# Patient Record
Sex: MALE | Race: WHITE | NOT HISPANIC OR LATINO | Employment: OTHER | URBAN - NONMETROPOLITAN AREA
[De-identification: names, ages, dates, MRNs, and addresses within clinical notes are randomized per-mention and may not be internally consistent; named-entity substitution may affect disease eponyms.]

---

## 2017-01-26 ENCOUNTER — COMMUNICATION - GICH (OUTPATIENT)
Dept: INTERNAL MEDICINE | Facility: OTHER | Age: 72
End: 2017-01-26

## 2017-01-26 DIAGNOSIS — K29.70 GASTRITIS WITHOUT BLEEDING: ICD-10-CM

## 2017-01-26 DIAGNOSIS — N39.41 URGE INCONTINENCE: ICD-10-CM

## 2017-01-26 DIAGNOSIS — K29.90 GASTRODUODENITIS WITHOUT BLEEDING: ICD-10-CM

## 2017-01-26 DIAGNOSIS — R60.9 EDEMA: ICD-10-CM

## 2017-01-26 DIAGNOSIS — M10.9 GOUT: ICD-10-CM

## 2017-02-01 ENCOUNTER — COMMUNICATION - GICH (OUTPATIENT)
Dept: INTERNAL MEDICINE | Facility: OTHER | Age: 72
End: 2017-02-01

## 2017-02-01 DIAGNOSIS — N39.41 URGE INCONTINENCE: ICD-10-CM

## 2017-02-27 ENCOUNTER — COMMUNICATION - GICH (OUTPATIENT)
Dept: INTERNAL MEDICINE | Facility: OTHER | Age: 72
End: 2017-02-27

## 2017-02-27 DIAGNOSIS — M19.90 OSTEOARTHRITIS: ICD-10-CM

## 2017-03-02 ENCOUNTER — COMMUNICATION - GICH (OUTPATIENT)
Dept: INTERNAL MEDICINE | Facility: OTHER | Age: 72
End: 2017-03-02

## 2017-03-02 DIAGNOSIS — F69 UNSPECIFIED DISORDER OF ADULT PERSONALITY AND BEHAVIOR: ICD-10-CM

## 2017-03-06 ENCOUNTER — HISTORY (OUTPATIENT)
Dept: EMERGENCY MEDICINE | Facility: OTHER | Age: 72
End: 2017-03-06

## 2017-03-07 ENCOUNTER — AMBULATORY - GICH (OUTPATIENT)
Dept: INTERNAL MEDICINE | Facility: OTHER | Age: 72
End: 2017-03-07

## 2017-03-09 ENCOUNTER — AMBULATORY - GICH (OUTPATIENT)
Dept: INTERNAL MEDICINE | Facility: OTHER | Age: 72
End: 2017-03-09

## 2017-03-10 ENCOUNTER — HISTORY (OUTPATIENT)
Dept: INTERNAL MEDICINE | Facility: OTHER | Age: 72
End: 2017-03-10

## 2017-03-10 ENCOUNTER — OFFICE VISIT - GICH (OUTPATIENT)
Dept: INTERNAL MEDICINE | Facility: OTHER | Age: 72
End: 2017-03-10

## 2017-03-10 DIAGNOSIS — F79 INTELLECTUAL DISABILITY: ICD-10-CM

## 2017-03-10 DIAGNOSIS — R46.89 OTHER SYMPTOMS AND SIGNS INVOLVING APPEARANCE AND BEHAVIOR: ICD-10-CM

## 2017-03-10 DIAGNOSIS — Q87.40 MARFAN'S SYNDROME: ICD-10-CM

## 2017-03-13 ENCOUNTER — AMBULATORY - GICH (OUTPATIENT)
Dept: INTERNAL MEDICINE | Facility: OTHER | Age: 72
End: 2017-03-13

## 2017-03-13 ENCOUNTER — COMMUNICATION - GICH (OUTPATIENT)
Dept: INTERNAL MEDICINE | Facility: OTHER | Age: 72
End: 2017-03-13

## 2017-03-13 DIAGNOSIS — Q87.40 MARFAN'S SYNDROME: ICD-10-CM

## 2017-03-22 ENCOUNTER — AMBULATORY - GICH (OUTPATIENT)
Dept: SURGERY | Facility: OTHER | Age: 72
End: 2017-03-22

## 2017-03-27 ENCOUNTER — AMBULATORY - GICH (OUTPATIENT)
Dept: INTERNAL MEDICINE | Facility: OTHER | Age: 72
End: 2017-03-27

## 2017-03-29 ENCOUNTER — COMMUNICATION - GICH (OUTPATIENT)
Dept: INTERNAL MEDICINE | Facility: OTHER | Age: 72
End: 2017-03-29

## 2017-03-29 DIAGNOSIS — K29.90 GASTRODUODENITIS WITHOUT BLEEDING: ICD-10-CM

## 2017-03-29 DIAGNOSIS — Q87.40 MARFAN'S SYNDROME: ICD-10-CM

## 2017-03-29 DIAGNOSIS — R60.9 EDEMA: ICD-10-CM

## 2017-03-29 DIAGNOSIS — N39.41 URGE INCONTINENCE: ICD-10-CM

## 2017-03-29 DIAGNOSIS — K29.70 GASTRITIS WITHOUT BLEEDING: ICD-10-CM

## 2017-04-25 ENCOUNTER — COMMUNICATION - GICH (OUTPATIENT)
Dept: INTERNAL MEDICINE | Facility: OTHER | Age: 72
End: 2017-04-25

## 2017-04-25 DIAGNOSIS — M10.9 GOUT: ICD-10-CM

## 2017-04-25 DIAGNOSIS — K29.90 GASTRODUODENITIS WITHOUT BLEEDING: ICD-10-CM

## 2017-04-25 DIAGNOSIS — K29.70 GASTRITIS WITHOUT BLEEDING: ICD-10-CM

## 2017-04-25 DIAGNOSIS — N39.41 URGE INCONTINENCE: ICD-10-CM

## 2017-04-25 DIAGNOSIS — R60.9 EDEMA: ICD-10-CM

## 2017-04-25 DIAGNOSIS — Q87.40 MARFAN'S SYNDROME: ICD-10-CM

## 2017-05-30 ENCOUNTER — COMMUNICATION - GICH (OUTPATIENT)
Dept: INTERNAL MEDICINE | Facility: OTHER | Age: 72
End: 2017-05-30

## 2017-05-30 DIAGNOSIS — K59.09 OTHER CONSTIPATION: ICD-10-CM

## 2017-06-16 ENCOUNTER — COMMUNICATION - GICH (OUTPATIENT)
Dept: FAMILY MEDICINE | Facility: OTHER | Age: 72
End: 2017-06-16

## 2017-06-16 DIAGNOSIS — K59.00 CONSTIPATION: ICD-10-CM

## 2017-07-11 ENCOUNTER — COMMUNICATION - GICH (OUTPATIENT)
Dept: INTERNAL MEDICINE | Facility: OTHER | Age: 72
End: 2017-07-11

## 2017-07-11 ENCOUNTER — COMMUNICATION - GICH (OUTPATIENT)
Dept: FAMILY MEDICINE | Facility: OTHER | Age: 72
End: 2017-07-11

## 2017-07-11 DIAGNOSIS — M19.90 OSTEOARTHRITIS: ICD-10-CM

## 2017-07-11 DIAGNOSIS — K59.00 CONSTIPATION: ICD-10-CM

## 2017-07-17 ENCOUNTER — COMMUNICATION - GICH (OUTPATIENT)
Dept: INTERNAL MEDICINE | Facility: OTHER | Age: 72
End: 2017-07-17

## 2017-07-17 DIAGNOSIS — M19.90 OSTEOARTHRITIS: ICD-10-CM

## 2017-07-25 ENCOUNTER — HISTORY (OUTPATIENT)
Dept: INTERNAL MEDICINE | Facility: OTHER | Age: 72
End: 2017-07-25

## 2017-07-25 ENCOUNTER — OFFICE VISIT - GICH (OUTPATIENT)
Dept: INTERNAL MEDICINE | Facility: OTHER | Age: 72
End: 2017-07-25

## 2017-07-25 DIAGNOSIS — Q87.40 MARFAN'S SYNDROME: ICD-10-CM

## 2017-07-25 DIAGNOSIS — F79 INTELLECTUAL DISABILITY: ICD-10-CM

## 2017-07-26 ENCOUNTER — AMBULATORY - GICH (OUTPATIENT)
Dept: PHYSICAL THERAPY | Facility: OTHER | Age: 72
End: 2017-07-26

## 2017-07-26 DIAGNOSIS — Q87.40 MARFAN'S SYNDROME: ICD-10-CM

## 2017-07-26 DIAGNOSIS — F79 INTELLECTUAL DISABILITY: ICD-10-CM

## 2017-08-10 ENCOUNTER — COMMUNICATION - GICH (OUTPATIENT)
Dept: FAMILY MEDICINE | Facility: OTHER | Age: 72
End: 2017-08-10

## 2017-08-10 DIAGNOSIS — K59.00 CONSTIPATION: ICD-10-CM

## 2017-08-25 ENCOUNTER — HOSPITAL ENCOUNTER (OUTPATIENT)
Dept: PHYSICAL THERAPY | Facility: OTHER | Age: 72
Setting detail: THERAPIES SERIES
End: 2017-08-25
Attending: INTERNAL MEDICINE

## 2017-08-25 ENCOUNTER — AMBULATORY - GICH (OUTPATIENT)
Dept: SCHEDULING | Facility: OTHER | Age: 72
End: 2017-08-25

## 2017-08-25 DIAGNOSIS — F79 INTELLECTUAL DISABILITY: ICD-10-CM

## 2017-08-25 DIAGNOSIS — Q87.40 MARFAN'S SYNDROME: ICD-10-CM

## 2017-09-08 ENCOUNTER — COMMUNICATION - GICH (OUTPATIENT)
Dept: INTERNAL MEDICINE | Facility: OTHER | Age: 72
End: 2017-09-08

## 2017-09-08 DIAGNOSIS — M10.9 GOUT: ICD-10-CM

## 2017-10-02 ENCOUNTER — COMMUNICATION - GICH (OUTPATIENT)
Dept: INTERNAL MEDICINE | Facility: OTHER | Age: 72
End: 2017-10-02

## 2017-10-02 DIAGNOSIS — K59.00 CONSTIPATION: ICD-10-CM

## 2017-10-05 ENCOUNTER — COMMUNICATION - GICH (OUTPATIENT)
Dept: INTERNAL MEDICINE | Facility: OTHER | Age: 72
End: 2017-10-05

## 2017-10-05 DIAGNOSIS — M10.9 GOUT: ICD-10-CM

## 2017-10-09 ENCOUNTER — OFFICE VISIT - GICH (OUTPATIENT)
Dept: INTERNAL MEDICINE | Facility: OTHER | Age: 72
End: 2017-10-09

## 2017-10-09 ENCOUNTER — AMBULATORY - GICH (OUTPATIENT)
Dept: INTERNAL MEDICINE | Facility: OTHER | Age: 72
End: 2017-10-09

## 2017-10-09 ENCOUNTER — HISTORY (OUTPATIENT)
Dept: INTERNAL MEDICINE | Facility: OTHER | Age: 72
End: 2017-10-09

## 2017-10-09 DIAGNOSIS — M10.9 GOUT: ICD-10-CM

## 2017-10-09 DIAGNOSIS — F79 INTELLECTUAL DISABILITY: ICD-10-CM

## 2017-10-09 DIAGNOSIS — R73.09 OTHER ABNORMAL GLUCOSE: ICD-10-CM

## 2017-10-09 DIAGNOSIS — K59.00 CONSTIPATION: ICD-10-CM

## 2017-10-09 DIAGNOSIS — Q87.40 MARFAN'S SYNDROME: ICD-10-CM

## 2017-10-09 DIAGNOSIS — I87.2 VENOUS INSUFFICIENCY (CHRONIC) (PERIPHERAL): ICD-10-CM

## 2017-10-09 LAB
A/G RATIO - HISTORICAL: 1.4 (ref 1–2)
ABSOLUTE BASOPHILS - HISTORICAL: 0 THOU/CU MM
ABSOLUTE EOSINOPHILS - HISTORICAL: 0.1 THOU/CU MM
ABSOLUTE IMMATURE GRANULOCYTES(METAS,MYELOS,PROS) - HISTORICAL: 0 THOU/CU MM
ABSOLUTE LYMPHOCYTES - HISTORICAL: 1.9 THOU/CU MM (ref 0.9–2.9)
ABSOLUTE MONOCYTES - HISTORICAL: 0.6 THOU/CU MM
ABSOLUTE NEUTROPHILS - HISTORICAL: 4.1 THOU/CU MM (ref 1.7–7)
ALBUMIN SERPL-MCNC: 4.6 G/DL (ref 3.5–5.7)
ALP SERPL-CCNC: 97 IU/L (ref 34–104)
ALT (SGPT) - HISTORICAL: 14 IU/L (ref 7–52)
ANION GAP - HISTORICAL: 4 (ref 5–18)
AST SERPL-CCNC: 17 IU/L (ref 13–39)
BASOPHILS # BLD AUTO: 0.4 %
BILIRUB SERPL-MCNC: 1.3 MG/DL (ref 0.3–1)
BUN SERPL-MCNC: 16 MG/DL (ref 7–25)
BUN/CREAT RATIO - HISTORICAL: 17
CALCIUM SERPL-MCNC: 10 MG/DL (ref 8.6–10.3)
CHLORIDE SERPLBLD-SCNC: 103 MMOL/L (ref 98–107)
CO2 SERPL-SCNC: 27 MMOL/L (ref 21–31)
CREAT SERPL-MCNC: 0.92 MG/DL (ref 0.7–1.3)
EOSINOPHIL NFR BLD AUTO: 1 %
ERYTHROCYTE [DISTWIDTH] IN BLOOD BY AUTOMATED COUNT: 13.8 % (ref 11.5–15.5)
ESTIMATED AVERAGE GLUCOSE: 126 MG/DL
GFR IF NOT AFRICAN AMERICAN - HISTORICAL: >60 ML/MIN/1.73M2
GLOBULIN - HISTORICAL: 3.3 G/DL (ref 2–3.7)
GLUCOSE SERPL-MCNC: 130 MG/DL (ref 70–105)
HCT VFR BLD AUTO: 45.3 % (ref 37–53)
HEMOGLOBIN A1C MONITORING (POCT) - HISTORICAL: 6 % (ref 4–6.2)
HEMOGLOBIN: 15.5 G/DL (ref 13.5–17.5)
IMMATURE GRANULOCYTES(METAS,MYELOS,PROS) - HISTORICAL: 0.1 %
LYMPHOCYTES NFR BLD AUTO: 28.3 % (ref 20–44)
MCH RBC QN AUTO: 30.8 PG (ref 26–34)
MCHC RBC AUTO-ENTMCNC: 34.2 G/DL (ref 32–36)
MCV RBC AUTO: 90 FL (ref 80–100)
MONOCYTES NFR BLD AUTO: 9.1 %
NEUTROPHILS NFR BLD AUTO: 61.1 % (ref 42–72)
PLATELET # BLD AUTO: 235 THOU/CU MM (ref 140–440)
PMV BLD: 8.9 FL (ref 6.5–11)
POTASSIUM SERPL-SCNC: 3.7 MMOL/L (ref 3.5–5.1)
PROT SERPL-MCNC: 7.9 G/DL (ref 6.4–8.9)
RED BLOOD COUNT - HISTORICAL: 5.03 MIL/CU MM (ref 4.3–5.9)
SODIUM SERPL-SCNC: 134 MMOL/L (ref 133–143)
WHITE BLOOD COUNT - HISTORICAL: 6.7 THOU/CU MM (ref 4.5–11)

## 2017-10-10 ENCOUNTER — COMMUNICATION - GICH (OUTPATIENT)
Dept: INTERNAL MEDICINE | Facility: OTHER | Age: 72
End: 2017-10-10

## 2017-10-10 DIAGNOSIS — Q87.40 MARFAN'S SYNDROME: ICD-10-CM

## 2017-10-16 ENCOUNTER — COMMUNICATION - GICH (OUTPATIENT)
Dept: INTERNAL MEDICINE | Facility: OTHER | Age: 72
End: 2017-10-16

## 2017-10-17 ENCOUNTER — COMMUNICATION - GICH (OUTPATIENT)
Dept: INTERNAL MEDICINE | Facility: OTHER | Age: 72
End: 2017-10-17

## 2017-11-30 ENCOUNTER — COMMUNICATION - GICH (OUTPATIENT)
Dept: INTERNAL MEDICINE | Facility: OTHER | Age: 72
End: 2017-11-30

## 2017-11-30 DIAGNOSIS — K29.90 GASTRODUODENITIS WITHOUT BLEEDING: ICD-10-CM

## 2017-11-30 DIAGNOSIS — R60.9 EDEMA: ICD-10-CM

## 2017-11-30 DIAGNOSIS — N39.41 URGE INCONTINENCE: ICD-10-CM

## 2017-11-30 DIAGNOSIS — K29.70 GASTRITIS WITHOUT BLEEDING: ICD-10-CM

## 2017-12-19 ENCOUNTER — HOSPITAL ENCOUNTER (OUTPATIENT)
Dept: RADIOLOGY | Facility: OTHER | Age: 72
End: 2017-12-19
Attending: NURSE PRACTITIONER

## 2017-12-19 ENCOUNTER — OFFICE VISIT - GICH (OUTPATIENT)
Dept: FAMILY MEDICINE | Facility: OTHER | Age: 72
End: 2017-12-19

## 2017-12-19 ENCOUNTER — HISTORY (OUTPATIENT)
Dept: FAMILY MEDICINE | Facility: OTHER | Age: 72
End: 2017-12-19

## 2017-12-19 DIAGNOSIS — R05.9 COUGH: ICD-10-CM

## 2017-12-19 DIAGNOSIS — J18.1 LOBAR PNEUMONIA (H): ICD-10-CM

## 2017-12-27 ENCOUNTER — COMMUNICATION - GICH (OUTPATIENT)
Dept: INTERNAL MEDICINE | Facility: OTHER | Age: 72
End: 2017-12-27

## 2017-12-27 DIAGNOSIS — K29.70 GASTRITIS WITHOUT BLEEDING: ICD-10-CM

## 2017-12-27 DIAGNOSIS — N39.41 URGE INCONTINENCE: ICD-10-CM

## 2017-12-27 DIAGNOSIS — K29.90 GASTRODUODENITIS WITHOUT BLEEDING: ICD-10-CM

## 2017-12-27 DIAGNOSIS — R60.9 EDEMA: ICD-10-CM

## 2017-12-27 NOTE — PROGRESS NOTES
Patient Information     Patient Name MRN Sex Dhiraj Rocha [id number] Male 1945      Progress Notes by Armaan Gallego MD at 2017 10:20 AM     Author:  Armaan Gallego MD Service:  (none) Author Type:  Physician     Filed:  2017 10:54 AM Encounter Date:  2017 Status:  Signed     :  Armaan Gallego MD (Physician)            SUBJECTIVE:    Dhiraj De La Cruz is a 72 y.o. male who presents for FTF for wheelchair.    HPI Comments: Eliezer is brought in today for a face-to-face visit. He needs a wheelchair. He is able to walk with assistance but he is very unsteady. He is much more comfortable if he is in the wheelchair especially when he goes places. For example, when he goes to work he needs to have the wheelchair take him out to the bus. They will then take him off the bus with a wheelchair as well. When he comes to the clinic he will stay in the wheelchair the entire time and is much easier for them to just strap the wheelchair into the van rather than having him try to transfer seats. As mentioned, he is able to walk but is unsteady. He has not fallen yet. He will not get up on his own presumably because he knows that he is unsteady. He uses the wheelchair in the shower as well.    Other than that the patient has been doing just fine. His behavioral situation has been stable ever since he's been back on the [facility]. I spent some time reconciling his medications today. He had complete lab when he was in the emergency room in March of this year that was reviewed, everything looked acceptable and I don't think that anything further needs to be done with that.      No Known Allergies,   Current Outpatient Prescriptions     Medication  Sig     allopurinol (ZYLOPRIM) 300 mg tablet TAKE 1 TABLET BY MOUTH DAILY (AM)     busPIRone (BUSPAR) 5 mg tablet TAKE 1 TABLET BY MOUTH THREE TIMES DAILY (7AM,3PM,8PM)     DOC-Q-LACE 100 mg capsule TAKE ONE CAPSULE BY MOUTH TWO TIMES A DAY (AM,8PM)     furosemide  (LASIX) 40 mg tablet TAKE 2 TABLETS BY MOUTH EVERY DAY AT 7AM     haloperidol (HALDOL) 5 mg tablet TAKE AS DIRECTED 1 HOUR BEFORE APPOINTMENT/NAIL TRIMMING     LORazepam (ATIVAN) 2 mg tab TAKE ONE TABLET BY MOUTH AT BEDTIME IF NEEDED. GIVE 30 MINUTES PRIOR TO APPOINTMENTS/NAIL TRIMMING/ETC.     MAPAP EXTRA STRENGTH 500 mg tablet TAKE 2 TABLETS BY MOUTH TWICE DAILY (AM,HS) *BREAK IN HALF*     methylcellulose (CITRUCEL) packet Take 1 Packet by mouth once daily. Mix with 8 oz glass of water.     OLANzapine (ZYPREXA) 5 mg tablet TAKE ONE TABLET BY MOUTH AT 8PM     omeprazole (PRILOSEC) 20 mg Delayed-Release capsule TAKE ONE CAPSULE BY MOUTH EVERY DAY AT 3PM     potassium chloride (KLOR-CON 10; K-TAB) 10 mEq Controlled-Release tablet TAKE 1 TABLET BY MOUTH EVERY MORNING     pseudoephedrine (SUDAFED) 30 mg tablet Take 1 tablet by mouth every 4 hours if needed for Nasal Congestion.     sennosides (SENNA) 8.6 mg tablet Take 1 tablet by mouth 2 times daily.     tamsulosin (FLOMAX) 0.4 mg capsule TAKE 1 CAPSULE 30 MINUTES AFTER SUPPER     triamcinolone (ARISTOCORT; KENALOG) 0.1 % cream Apply  topically to affected area(s) 3 times daily.     No current facility-administered medications for this visit.      Medications have been reviewed by me and are current to the best of my knowledge and ability. ,   Past Medical History:     Diagnosis  Date     Gastritis     Nonsteroidal induced gastritis with GI bleed      Gout 12/9/2013     H/O aortic aneurysm      Marfan syndrome      Mental retardation      Recurrent acute pancreatitis      Situs inversus      Urinary incontinence      Venous stasis     Chronic venous stasis    ,   Patient Active Problem List       Diagnosis  Date Noted     Behavioral change  03/10/2017     ACP (advance care planning)  12/12/2013     Gout  12/09/2013     DIABETES MELLITUS, TYPE II, BORDERLINE  08/10/2010     CONSTIPATION  07/22/2010     MARFAN'S SYNDROME       AORTIC ANEURYSM       history of           SITUS INVERSUS       MENTAL RETARDATION       GASTRITIS       nonsteriodal induced gastritis with GI bleed          STASIS DERMATITIS, CHRONIC       legs          URINARY INCONTINENCE       GLAUCOMA       EDEMA       INGUINAL HERNIA, LEFT       ACUTE PANCREATITIS       recurrent        ,   Past Surgical History:      Procedure  Laterality Date     OPEN CHOLECYSTECTOMY  02/25/2010    and   Social History      Substance Use Topics        Smoking status:  Former Smoker     Quit date: 1/28/2004     Smokeless tobacco:  Never Used     Alcohol use  No       REVIEW OF SYSTEMS:  Review of Systems   All other systems reviewed and are negative.      OBJECTIVE:  /78  Pulse 60    EXAM:   Physical Exam   Constitutional: He is well-developed, well-nourished, and in no distress. No distress.   In wheelchair   Cardiovascular: Normal rate, regular rhythm and normal heart sounds.    Pulmonary/Chest: Effort normal and breath sounds normal. No respiratory distress. He has no wheezes. He has no rales.   Abdominal: Soft. Bowel sounds are normal. He exhibits no distension. There is no tenderness. There is no rebound and no guarding.   Musculoskeletal: He exhibits no edema.   Neurological: He is alert.   Skin: Skin is warm and dry. He is not diaphoretic.   Nursing note and vitals reviewed.      ASSESSMENT/PLAN:    ICD-10-CM    1. Mental retardation F79 Wheel Chair   2. Marfan's syndrome Q87.40 busPIRone (BUSPAR) 5 mg tablet      Wheel Chair        Plan:  Eliezer appears to be doing very well at this time. It also seems very reasonable for him to get a wheelchair due to his fear of ambulating and his presumed fall risk. Order for this was written today. Follow-up with me will be as needed. Meds and treatments are reconciled today with no changes made. Over 50% of a 25 minute visit spent in counseling and coordination of care.

## 2017-12-28 NOTE — TELEPHONE ENCOUNTER
Patient Information     Patient Name MRN Sex Dhiraj Rocha [id number] Male 1945      Telephone Encounter by Inez Gonzales at 10/17/2017  3:42 PM     Author:  Inez Gonzales Service:  (none) Author Type:  (none)     Filed:  10/17/2017  3:47 PM Encounter Date:  10/17/2017 Status:  Signed     :  Inez Gonzales            MAF-Pt needs 'justification' for shower chair sent to [facility]    Inez Gonzales ....................  10/17/2017   3:47 PM

## 2017-12-28 NOTE — TELEPHONE ENCOUNTER
Patient Information     Patient Name MRN Sex Dhiraj Rocha [id number] Male 1945      Telephone Encounter by Jami Cote LPN at 10/10/2017  3:30 PM     Author:  Jami Cote LPN Service:  (none) Author Type:  NURS- Licensed Practical Nurse     Filed:  10/10/2017  3:33 PM Encounter Date:  10/10/2017 Status:  Signed     :  Jami Cote LPN (NURS- Licensed Practical Nurse)            [facility] called and stated they just need a order for a rolling shower chair. This is order is teed up below. They would like it faxed to [phone].  Jami Cote LPN......10/10/2017  3:33 PM

## 2017-12-28 NOTE — TELEPHONE ENCOUNTER
Patient Information     Patient Name MRN Dhiraj Blake [id number] Male 1945      Telephone Encounter by Boris Luna RN at 2017 11:06 AM     Author:  Boris Luna RN Service:  (none) Author Type:  NURS- Registered Nurse     Filed:  2017 11:07 AM Encounter Date:  8/10/2017 Status:  Signed     :  Boris Luna RN (NURS- Registered Nurse)            Office visit in the past 12 months or per provider note.    Last visit with Armaan Gallego MD was 17    Max refill for 12 months from last office visit or per provider note.    Prescription refilled per RN Medication Refill Policy.................... BORIS LUNA RN ....................  2017   11:07 AM

## 2017-12-28 NOTE — PROGRESS NOTES
Patient Information     Patient Name MRN Sex Dhiraj Rocha [id number] Male 1945      Progress Notes by Armaan Gallego MD at 10/9/2017 10:20 AM     Author:  Armaan Gallego MD  Service:  (none) Author Type:  Physician     Filed:  10/16/2017  4:11 PM  Encounter Date:  10/9/2017 Status:  Addendum     :  Armaan Gallego MD (Physician)        Related Notes: Original Note by Armaan Gallego MD (Physician) filed at 10/9/2017 10:43 AM            SUBJECTIVE:    Dhiraj De La Cruz is a 72 y.o. male who presents for comprehensive review of their multiple medical problems and review of medications, renewal of medications and update on necessary health maintenance issues.      HPI Comments: Eliezer is here today for his annual checkup. He is here with staff who is well aware of his situation. Staff reports that the patient has been doing quite well and is stable. He is not as active as he used to be and often times she refuses to walk. He also refuses to get up to go into the shower so these a wheelchair and then a shower chair. Once in there, he does not fight with them or struggle. He cooperates with most things. Overall his behaviors have been well controlled without any significant outbursts.    He does not have any difficulties eating and his appetite is always good. He does have some constipation and does take various medications for treatment of that. He has a when necessary order for Amatiza but has not needed that anytime recently. He is not having any bladder or concerns. He sleeps well. He doesn't seem to be acting out and therefore does not appear to be having any specific complaints or concerns and is otherwise comfortable.    Medications are reconciled. Past medical history, past surgical history, family history and social history are reviewed.      No Known Allergies,   Current Outpatient Prescriptions     Medication  Sig     allopurinol (ZYLOPRIM) 300 mg tablet TAKE 1 TABLET BY MOUTH DAILY (AM)      busPIRone (BUSPAR) 5 mg tablet TAKE 1 TABLET BY MOUTH THREE TIMES DAILY (7AM,3PM,8PM)     DOC-Q-LACE 100 mg capsule TAKE ONE CAPSULE BY MOUTH TWO TIMES A DAY (AM,8PM)     furosemide (LASIX) 40 mg tablet TAKE 2 TABLETS BY MOUTH EVERY DAY AT 7AM     haloperidol (HALDOL) 5 mg tablet TAKE AS DIRECTED 1 HOUR BEFORE APPOINTMENT/NAIL TRIMMING     LORazepam (ATIVAN) 2 mg tab TAKE ONE TABLET BY MOUTH AT BEDTIME IF NEEDED. GIVE 30 MINUTES PRIOR TO APPOINTMENTS/NAIL TRIMMING/ETC.     MAPAP EXTRA STRENGTH 500 mg tablet TAKE 2 TABLETS BY MOUTH TWICE DAILY (AM,HS) *BREAK IN HALF*     methylcellulose (CITRUCEL) packet Take 1 Packet by mouth once daily. Mix with 8 oz glass of water.     OLANzapine (ZYPREXA) 5 mg tablet TAKE ONE TABLET BY MOUTH AT 8PM     omeprazole (PRILOSEC) 20 mg Delayed-Release capsule TAKE ONE CAPSULE BY MOUTH EVERY DAY AT 3PM     potassium chloride (KLOR-CON 10; K-TAB) 10 mEq Controlled-Release tablet TAKE 1 TABLET BY MOUTH EVERY MORNING     SENNA 8.6 mg tablet TAKE 1 TABLET BY MOUTH TWICE DAILY (AM,8PM)     tamsulosin (FLOMAX) 0.4 mg capsule TAKE 1 CAPSULE 30 MINUTES AFTER SUPPER     No current facility-administered medications for this visit.      Medications have been reviewed by me and are current to the best of my knowledge and ability. ,   Past Medical History:     Diagnosis  Date     Gastritis     Nonsteroidal induced gastritis with GI bleed      Gout 12/9/2013     H/O aortic aneurysm      Marfan syndrome      Mental retardation      Recurrent acute pancreatitis      Situs inversus      Urinary incontinence      Venous stasis     Chronic venous stasis    ,   Patient Active Problem List       Diagnosis  Date Noted     ACP (advance care planning)  12/12/2013     Gout  12/09/2013     DIABETES MELLITUS, TYPE II, BORDERLINE  08/10/2010     CONSTIPATION  07/22/2010     MARFAN'S SYNDROME       AORTIC ANEURYSM       history of          SITUS INVERSUS       MENTAL RETARDATION       GASTRITIS       nonsteriodal  induced gastritis with GI bleed          STASIS DERMATITIS, CHRONIC       legs          URINARY INCONTINENCE       GLAUCOMA       EDEMA       INGUINAL HERNIA, LEFT       ACUTE PANCREATITIS       recurrent        ,   Past Surgical History:      Procedure  Laterality Date     OPEN CHOLECYSTECTOMY  2010    and   Social History      Substance Use Topics        Smoking status:  Former Smoker     Quit date: 2004     Smokeless tobacco:  Never Used     Alcohol use  No     Family Status     Relation  Status     Mother      Father      Sister Alive    Alive and well      Social History     Social History        Marital status:  Single     Spouse name: N/A     Number of children:  N/A     Years of education:  N/A     Social History Main Topics        Smoking status:  Former Smoker     Quit date: 2004     Smokeless tobacco:  Never Used     Alcohol use  No     Drug use:  No     Sexual activity:  Not Asked     Other Topics  Concern     None      Social History Narrative     Patient is a long term resident of the [facility].  Legal guardian is his sister, Jessica Altamirano.  Code status is DNR--therapeutic.                   REVIEW OF SYSTEMS:  Review of Systems   Constitutional: Negative for chills, diaphoresis, fever, malaise/fatigue and weight loss.   HENT: Negative for congestion, ear pain, nosebleeds, sore throat and tinnitus.    Eyes: Negative for blurred vision, double vision, photophobia, pain, discharge and redness.   Respiratory: Negative for cough, hemoptysis, sputum production, shortness of breath and wheezing.    Cardiovascular: Negative for chest pain, palpitations, orthopnea, claudication, leg swelling and PND.   Gastrointestinal: Negative for abdominal pain, blood in stool, constipation, diarrhea, heartburn, nausea and vomiting.   Genitourinary: Negative for dysuria, flank pain and hematuria.   Musculoskeletal: Negative for back pain, joint pain, myalgias and neck pain.   Skin:  Negative for itching and rash.   Neurological: Negative for dizziness, tingling, tremors, speech change, loss of consciousness, weakness and headaches.   Psychiatric/Behavioral: Negative for depression, hallucinations, memory loss, substance abuse and suicidal ideas. The patient is not nervous/anxious.        OBJECTIVE:  /84  Pulse 96    EXAM:   Physical Exam   Constitutional: He is well-developed, well-nourished, and in no distress. No distress.   Sitting in wheelchair   HENT:   Head: Normocephalic.   Right Ear: External ear normal.   Left Ear: External ear normal.   Mouth/Throat: Oropharynx is clear and moist. No oropharyngeal exudate.   Eyes: Pupils are equal, round, and reactive to light.   Neck: Normal range of motion. Neck supple. No JVD present. No tracheal deviation present. No thyromegaly present.   Cardiovascular: Normal rate, regular rhythm and normal heart sounds.    Pulmonary/Chest: Effort normal and breath sounds normal. No respiratory distress. He has no wheezes. He has no rales.   Abdominal: Soft. Bowel sounds are normal. He exhibits no distension. There is no tenderness. There is no rebound and no guarding.   Musculoskeletal: He exhibits no edema.   Lymphadenopathy:     He has no cervical adenopathy.   Neurological: He is alert.   Noncommunicative   Skin: Skin is warm and dry. He is not diaphoretic.   Nursing note and vitals reviewed.      ASSESSMENT/PLAN:    ICD-10-CM    1. Gout, unspecified cause, unspecified chronicity, unspecified site M10.9    2. DIABETES MELLITUS, TYPE II, BORDERLINE R73.09 COMPLETE METABOLIC PANEL      HEMOGLOBIN A1C MONITORING (POCT)      CBC WITH DIFFERENTIAL   3. Constipation, unspecified constipation type K59.00    4. Marfan's syndrome Q87.40    5. Mental retardation F79    6. STASIS DERMATITIS, CHRONIC I87.2         Plan:  Overall he appears to be doing well and is stable. Medications are left unchanged. Complete lab drawn and pending, I will send a letter with  the results. He probably should get a Prevnar one of these days, we will likely need to get approval from his sister before giving that. His facility will give him the flu shot. Follow-up will be annually or anytime sooner if needed.    This patient is in need of a shower chair. This is due to difficulties with showering, ambulation and a high risk for a fall. This is ordered and should be approved as it is appropriate for him to have an use for safety reasons.

## 2017-12-28 NOTE — TELEPHONE ENCOUNTER
Patient Information     Patient Name MRN Sex Dhiraj Rocha [id number] Male 1945      Telephone Encounter by Raquel Sharma at 10/16/2017  4:00 PM     Author:  Raquel Sharma Service:  (none) Author Type:  (none)     Filed:  10/16/2017  4:02 PM Encounter Date:  10/16/2017 Status:  Signed     :  Raquel Sharma            Spoke with Amparo from [employer] in regards to mutual patient. She stated they are still unable to fill the order for the shower chair, because they need a face to face or information in the physical report to use as a face to face to fill the request.  Raquel Sharma LPN............................ 10/16/2017 4:02 PM

## 2017-12-28 NOTE — TELEPHONE ENCOUNTER
Patient Information     Patient Name MRN Sex Dhiraj Rocha [id number] Male 1945      Telephone Encounter by Jami Cote LPN at 10/10/2017  2:23 PM     Author:  Jami Cote LPN Service:  (none) Author Type:  NURS- Licensed Practical Nurse     Filed:  10/10/2017  2:23 PM Encounter Date:  10/10/2017 Status:  Signed     :  Jami Cote LPN (NURS- Licensed Practical Nurse)            [facility] is calling to find out if the patient needs a face to face to get a shower chair?  Jami Cote LPN......10/10/2017  2:23 PM

## 2017-12-28 NOTE — TELEPHONE ENCOUNTER
Patient Information     Patient Name MRN Dhiraj Blake [id number] Male 1945      Telephone Encounter by Amabr Canada RN at 2017 12:57 PM     Author:  Ambar Canada RN  Service:  (none) Author Type:  NURS- Registered Nurse     Filed:  2017  1:02 PM  Encounter Date:  2017 Status:  Addendum     :  Ambar Canada RN (NURS- Registered Nurse)        Related Notes: Original Note by Ambar Canada RN (NURS- Registered Nurse) filed at 2017  1:00 PM            Office visit in the past 12 months or per provider note.    Last visit with Armaan Gallego MD was on: 2017  Next visit with Armaan Gallego MD is on: None      Max refill for 12 months from last office visit or per provider note.    Prescription refilled per RN Medication Refill Policy.................... Ambar Canada RN ....................  2017   1:00 PM

## 2017-12-28 NOTE — TELEPHONE ENCOUNTER
Patient Information     Patient Name MRN Dhiraj Blake [id number] Male 1945      Telephone Encounter by Boris Luna RN at 2017  8:52 AM     Author:  Boris Luna RN Service:  (none) Author Type:  NURS- Registered Nurse     Filed:  2017  8:54 AM Encounter Date:  2017 Status:  Signed     :  Boris Luna RN (NURS- Registered Nurse)            Office visit in the past 12 months or per provider note.    Last visit with AMARI GARCIA was on: 03/10/2017 in [facility]  Next visit with AMARI GARCIA is on: 2017 in [facility]  Next visit with Internal Medicine is on: 2017 in [facility]    Max refill for 12 months from last office visit or per provider note.    Prescription refilled per RN Medication Refill Policy.................... BORIS LUNA RN ....................  2017   8:53 AM

## 2017-12-28 NOTE — TELEPHONE ENCOUNTER
Patient Information     Patient Name MRN Sex Dhiraj Rocha [id number] Male 1945      Telephone Encounter by Armaan Gallego MD at 10/10/2017  2:59 PM     Author:  Armaan Gallego MD Service:  (none) Author Type:  Physician     Filed:  10/10/2017  2:59 PM Encounter Date:  10/10/2017 Status:  Signed     :  Armaan Gallego MD (Physician)            I would think we could use his recent physical.

## 2017-12-28 NOTE — TELEPHONE ENCOUNTER
Patient Information     Patient Name MRN Sex Dhiraj Rocha [id number] Male 1945      Telephone Encounter by Jami Cote LPN at 10/10/2017  3:02 PM     Author:  Jami Cote LPN Service:  (none) Author Type:  NURS- Licensed Practical Nurse     Filed:  10/10/2017  3:03 PM Encounter Date:  10/10/2017 Status:  Signed     :  Jami Cote LPN (NURS- Licensed Practical Nurse)            Contacted [facility] and they stated they will have [facility] send Armaan Gallego MD paper work for the shower chair.  Jami Cote LPN......10/10/2017  3:03 PM

## 2017-12-28 NOTE — TELEPHONE ENCOUNTER
Patient Information     Patient Name MRN Sex Dhiraj Rocha [id number] Male 1945      Telephone Encounter by Chrissie Travis RN at 2017  3:12 PM     Author:  Chrissie Travis RN Service:  (none) Author Type:  NURS- Registered Nurse     Filed:  2017  3:19 PM Encounter Date:  2017 Status:  Signed     :  Chrissie Travis RN (NURS- Registered Nurse)            Refilled on 17 #200 x 3 refills,6 months.  To [facility].  Patient has appointment   Unable to complete prescription refill per RN Medication Refill Policy.................... CHRISSIE TRAVIS RN ....................  2017   3:18 PM

## 2017-12-28 NOTE — TELEPHONE ENCOUNTER
Patient Information     Patient Name MRN Sex Dhiraj Rocha [id number] Male 1945      Telephone Encounter by Pooja Duarte RN at 10/5/2017 10:31 AM     Author:  Pooja Duarte RN Service:  (none) Author Type:  NURS- Registered Nurse     Filed:  10/5/2017 10:35 AM Encounter Date:  10/2/2017 Status:  Signed     :  Pooja Duarte RN (NURS- Registered Nurse)            Office visit in the past 12 months or per provider note.    Last visit with AMARI GARCIA was on: 2017 in [facility]  Next visit with AMARI GARCIA is on: 10/09/2017 in [facility]  Next visit with Internal Medicine is on: 10/09/2017 in [facility]    Max refill for 12 months from last office visit or per provider note.  Prescription refilled per RN Medication Refill Policy.................... Pooja Duarte RN ....................  10/5/2017   10:32 AM

## 2017-12-28 NOTE — TELEPHONE ENCOUNTER
Patient Information     Patient Name MRN Sex Dhiraj Rocha [id number] Male 1945      Telephone Encounter by Raquel Sharma at 10/17/2017  4:00 PM     Author:  Raquel Sharma Service:  (none) Author Type:  (none)     Filed:  10/17/2017  4:19 PM Encounter Date:  10/17/2017 Status:  Signed     :  Raquel Sharma            Spoke with [facility] in regards to mutual patient. They stated they need a face to face for patient to receive a shower chair. Informed them that writer spoke with [facility] yesterday and Armaan Gallego MD in regards to this issue. Armaan Gallego MD okayed the use of patients last physical and [facility] was okay with using that.  Raquel Sharma LPN............................ 10/17/2017 4:19 PM

## 2017-12-28 NOTE — TELEPHONE ENCOUNTER
Patient Information     Patient Name MRN Sex Dhiraj Rocha [id number] Male 1945      Telephone Encounter by Armaan Gallego MD at 10/16/2017  4:11 PM     Author:  Armaan Gallego MD Service:  (none) Author Type:  Physician     Filed:  10/16/2017  4:11 PM Encounter Date:  10/16/2017 Status:  Signed     :  Armaan Gallego MD (Physician)            Note was addended

## 2017-12-28 NOTE — ADDENDUM NOTE
Patient Information     Patient Name MRN Sex Dhiraj Rocha [id number] Male 1945      Addendum Note by Ambar Banks RN at 2017  1:02 PM     Author:  Ambar Banks RN Service:  (none) Author Type:  NURS- Registered Nurse     Filed:  2017  1:02 PM Encounter Date:  2017 Status:  Signed     :  Ambar Banks RN (NURS- Registered Nurse)       Addended by: AMBAR BANKS on: 2017 01:02 PM        Modules accepted: Orders

## 2017-12-28 NOTE — TELEPHONE ENCOUNTER
Patient Information     Patient Name MRN Sex Dhiraj Rocha [id number] Male 1945      Telephone Encounter by Chrissie Travis RN at 10/5/2017 11:37 AM     Author:  Chrissie Travis RN Service:  (none) Author Type:  NURS- Registered Nurse     Filed:  10/5/2017 11:39 AM Encounter Date:  10/5/2017 Status:  Signed     :  Chrissie Travis RN (NURS- Registered Nurse)            GOUT    Office visit in the past 12 months or per provider note.    Last visit with AMARI GARCIA was on: 2017 in [facility]  Next visit with AMARI GARCIA is on: 10/09/2017 in [facility]  Next visit with Internal Medicine is on: 10/09/2017 in [facility]    Max refill for 12 months from last office visit or per provider note.  Prescription refilled per RN Medication Refill Policy.................... CHRISSIE TRAVIS RN ....................  10/5/2017   11:39 AM

## 2017-12-30 NOTE — NURSING NOTE
Patient Information     Patient Name MRN Sex Dhiraj Rocha [id number] Male 1945      Nursing Note by Iris Hahn at 2017 10:20 AM     Author:  Iris Hahn Service:  (none) Author Type:  (none)     Filed:  2017 10:39 AM Encounter Date:  2017 Status:  Signed     :  Iris Hahn            Patient is here to discuss getting a fitted wheelchair  Iris Hahn LPN 2017 10:33 AM

## 2017-12-30 NOTE — NURSING NOTE
Patient Information     Patient Name MRN Sex Dhiraj Rocha [id number] Male 1945      Nursing Note by Jami Cote LPN at 10/9/2017 10:20 AM     Author:  Jami Cote LPN Service:  (none) Author Type:  NURS- Licensed Practical Nurse     Filed:  10/9/2017 10:28 AM Encounter Date:  10/9/2017 Status:  Signed     :  Jami Cote LPN (NURS- Licensed Practical Nurse)            The patient is here today to be seen for a annual check up. The patient has not been to the eye doctor in the last year.  Jami Cote LPN......10/9/2017  10:14 AM

## 2017-12-30 NOTE — INITIAL ASSESSMENTS
"Patient Information     Patient Name MRN Sex Dhiraj Rocha [id number] Male 1945      Initial Assessments by Christina Tyler PT at 2017  1:42 PM     Author:  Christina Tyler PT Service:  (none) Author Type:  PT- Physical Therapist     Filed:  2017  1:18 PM Date of Service:  2017  1:42 PM Status:  Signed     :  Christina Tyler PT (PT- Physical Therapist)            [facility]  Outpatient PT - Wheelchair Evaluation      Date of Service: 2017  Visit #: 1 (of 10 for PSFS)   Patient Name: Dhiraj De La Cruz \"Eliezer\"   YOB: 1945   Referring MD/Provider: Dr. Gallego  Diagnosis: Marfans, mental retardation   Treatment Diagnosis:  Marfans, impaired mobility, impaired posture, balance and strength   Insurance: [facility]/Medicare   Start of Care Date:  17  Certification Dates: From: Start of Service: 17  Re-Cert Due: Medicare/MA Re-Cert Due: 10/20/17    Subjective    Symptoms/Pain Ratin = no pain, comfortable / Location:  Non-verbal with assessment, staff reports Eliezer will speak out if he doesn't want something or if something hurts.   Preadmission Functional Mobility: Dependent  Precautions:  Fall risk, cannot walk without assist   Oriented to Person, Place, and Time. no    Lives with [facility] in an adult group home. Presents with staff Irving.     Were cultural / age or other special adaptations needed? Yes due to patient's cognition his staff member attended entirety of appointment and answered all subjective questions for Eliezer.      Patient is a vulnerable adult: No      Patient is aware of diagnosis: Yes      Risks and benefits explained: Yes    Medical History: Reviewed,   Patient Active Problem List     Diagnosis  Code     CONSTIPATION K59.00     MARFAN'S SYNDROME Q87.40     AORTIC ANEURYSM I71.9     SITUS INVERSUS Q89.3     MENTAL RETARDATION F79     GASTRITIS K29.70, K29.90     STASIS DERMATITIS, CHRONIC I87.2     URINARY " INCONTINENCE R32     GLAUCOMA H40.9     EDEMA R60.9     INGUINAL HERNIA, LEFT K40.90     ACUTE PANCREATITIS K85.90     DIABETES MELLITUS, TYPE II, BORDERLINE R73.09     Gout M10.9     ACP (advance care planning) Z71.89     Behavioral change R46.89     Medications: Reviewed.    Previous falls: no known falls over the last year.     Current Mobility: Eliezer ambulates short distances within the house with MIN to MOD A from staff, does not use walker due to lack of coordination and cognition.     Prior level of function: no significant changes over the last year, wear and tear on wheelchair is main concern with poor posture and support while seated in chair.     Social History:   Living environment: Group home with 24/7 cares    Home environment: handicap accessible    PCA assistance: No   ADL's: Eliezer requires MOD to MAX A for dressing and bathing, can take loose shirts off, can assist with pulling pants up and down    Occupation: [employer] 3 days a week   Transportation: group home van or medivan   Bracing / Equipment:  Hospital bed, manual wheelchair that patient also uses in the shower as he lacks a shower chair,     Patient/caregiver concerns: Staff reports Eliezer has poor posture leaned to the right while seated in his current chair, it also does not fit him well and has significant wear and tear including a constant high pitched squeak as they use his chair in the shower as well, this sound tends to bother Eliezer as well as other residents at his group home.     Patient/caregiver goals: Obtain a manual wheelchair that fits Eliezer better for his posture.      Physical Therapy Comments:  Presents with staff member Irving Eliezer was noted to be comfortable in lobby, they did arrive 40 minutes late for 90 minute evaluation, Eliezer then got significantly agitated during evaluation and was yelling and unwilling to transfer and walk with staff or PT.     Objective  Oculomotor screen: unable to accurately assess due  to patient's cognition and inability to follow cues, poor eye contact with Eliezer looking at the floor most of the session.     Vision/hearing:  WFL, unable to accurately assess.     Communication/cognition: impaired, Eliezer was noted to yell incoherently with a few understandable words when he was agitated, otherwise he was non-verbal.     ROM/Strength: impaired- Limited B shoulder flexion and abduction to 100 degrees, difficult to get accurate measurements due to lack of patient cooperation, at this point he was agitated and yelling, attempted to have staff member encourage ROM at UE's without success. Unable to accurately assess strength with MMT again due to patient cooperation and level of agitation.      RIGHT LEFT   HIP FLEXION 3- 3-   KNEE EXTENSION 22, 3- 10, 3-   DORSIFLEXION 8, 3- 8, 3-      Coordination:  Impaired throughout and B     Sensation/Proprioception: unable to accurately assess, however, Eliezer was noted to withdraw his legs from PT with light touch.     Reflexes/Tone:  WFL    Posture: Eliezer sits in his current wheelchair with significant right trunk lean, left shoulder elevated compared to right with right windswept lower extremities, leans heavily on his right arm on the arm rest. Left hip is elevated compared to right and is more forward.     Balance:     Stance: unable to assess    Bench sitting: unable to assess     Special Tests: Patient is unable to ambulate or stand without assist and is therefore at risk for falls. Unable to assess with standardized gait and balance tests as patient refused to transfer and walk on evaluation date.     Transfers: Per staff patient requires MIN to MOD A of 1 to transfer depending on his fatigue levels and cooperation. Staff reports that if Eliezer is not willing or ready to transfer, staff cannot make him as he is too heavy.     Bed Mobility: Modified independence.                                        Gait / Stairs: Eliezer is no longer safe on stairs, he does not  perform them as he requires MIN to MOD A for gait. He walks for short distances with staff assist in the home, unable to assess in clinic as Eliezer was anxious and agitated on evaluation and refused to transfer and walk in clinic.     Wheelchair mobility:   Manual wheelchair: Eliezer remains a candidate for a manual wheelchair.    Scooter: Eliezer is not a candidate for a power scooter as a manual wheelchair meets his mobility needs.    Power wheelchair: Eliezer is not a candidate for a power wheelchair as a manual wheelchair meets his mobility needs.     Today's Intervention: Evaluation completed in tandem with local DME vendor, [employer]. This PT has no financial relationship with any DME vendors.      Eliezer was noted to get more agitated with attempts to encourage him to transfer and walk with staff, this continued whether staff or PT attempted. Discontinued due to levels of anxiety and patient yelling due to agitation. Eliezer was noted to be calm by the time staff wheeled him to the lobby.     Assessment:  Problem list: impaired mobility, impaired strength, impaired ROM, impaired posture     Response to Intervention:  Eliezer became agitated with evaluation, was calm by the time staff wheeled him to the lobby.      Potential for improvement:  Fair     Summary and recommended equipment: Dhiraj De La Cruz is a 72 y.o. male who presents with caregiver from group home for wheelchair assessment. Eliezer currently has a manual wheelchair without positioning cushion that is greater than five years old. Staff reports they use the wheelchair to get Eliezer into and out of the shower as he lacks a shower chair. There is significant wear and tear on the wheelchair and a loud high pitched squeak when being pushed that aggravates Eliezer. Eliezer has a diagnosis of Marfan's syndrome and mental retardation. He currently ambulates short distances within the group home with MIN to MOD A from staff for balance. He cannot utilize a walker, cane or crutches  "due to lack of coordination and balance as well as lowered cognition for learning these skills. He remains a candidate for a manual wheelchair.     Eliezer lacks upper and lower extremity strength as well as balance and coordination for walking to be his primary means of mobility, staff uses walking for exercise within the home to and from his bedroom and living room. Eliezer is a high risk for falls due to inability to balance without assist and impaired coordination, he is a candidate for a manual wheelchair.     Eliezer requires a standard height manual wheelchair with 18\" general use seat cushion as he does spend greater than 6 hours a day in his wheelchair. This will allow him to maintain his level of independence and safety without being at risk for falling. Eliezer does require a general use back for support due to his tendency to lean towards the left, this will offer support for upright posture. Eliezer will continue to use his manual wheelchair to navigate within the home with assist from staff to and from meals and the bathroom as well as using his wheelchair to attend work and medical appointments.       Patient Specific Functional and Pain Scales (PSFS): 8/25/2017    Clinician Instructions: Complete after the history and before the exam.    Initial Assessment: We want to know what 3 activities in your life you are unable to perform, or are having the most difficulty performing, as a result of your chief problem. Please list and score at least 3 activities that you are unable to perform, or having the most difficulty performing, because of your chief problem.   Patient Specific Activity Scoring Scheme (score one number for each activity):   Activity Score (0-10)  0= Unable to perform activity  10= Able to perform activity at same level as before injury or problem   1. maintaining posture in wheelchair  5/10   2. Transferring in and out of wheelchair 5/10   3. Using wheelchair at work 5/10   Totals:  15/30 = 50 % " ability which relates to 50% impairment    Patient verbally states that they understand that the information they have provided above is current and complete to the best of their knowledge.    Patient Specific Functional Scale Modifier Scale Conversion: (patient's modifier that correlates with pt's score on PSFS): 5-CK (50% Impaired).    G codes and Modifier taken from patient completing the PSFS:   Initial Primary G Code and Modifier:    Per the Patient's intake and/or assessment the Primary G Code is: Mobility .   The Patient's Impairment, Limitation or Restriction Modifier would be best described as: CK - 40% - 60% Impairment.   Goal Primary G Code and Modifier:    The Patient's G Code Goal would be: Mobility    The Patient's Impairment, Limitation or Restriction Modifier goal would be best described as: CK - 40% - 60% Impairment.   Discharge Primary G Code and Modifier:    The Patient's status upon Discharge is Mobility    The Patient's Impairment, Limitation or Restriction Modifier would be best described as CK - 40% - 60% Impairment.       Goals:  Eliezer will be assessed for appropriate mobility device to meet his needs and prevent falls and injury within 2 weeks. Goal met 8/25/17    Plan  Treatment Plan / Targeted Outcomes:     Frequency:   One time only     Duration of Treatment: 8 weeks    Planned Interventions:    Home Exercise Program development  Wheelchair Management  Therapeutic Exercise (ROM & Strengthening)  Manual Therapy  Gait training  Neuromuscular Re-education  Ultrasound  Electrical Stimulation  Phonophoresis with Ketoprofen  Therapeutic Activities    Plan for next visit:  Eliezer was seen for wheelchair evaluation only, he will be discharged while he awaits insurance approval for his updated equipment. Staff was instructed to discuss whether patient would need a shower chair to use versus using a wheelchair in the shower.     Student or PTA has been instructed in and demonstrates  skills necessary to carry out above stated treatment plan: No    Thank you for your referral to [facility].  Please call with any questions, concerns or comments.  [phone]    The signature, of the referring medical provider, on this document indicates certification of the above prescribed plan of care and is medically necessary.

## 2018-01-03 NOTE — NURSING NOTE
Patient Information     Patient Name MRN Sex Dhiraj Rocha [id number] Male 1945      Nursing Note by Jami Cote LPN at 3/10/2017  3:10 PM     Author:  Jami Cote LPN Service:  (none) Author Type:  NURS- Licensed Practical Nurse     Filed:  3/10/2017  3:16 PM Encounter Date:  3/10/2017 Status:  Signed     :  Jami Cote LPN (NURS- Licensed Practical Nurse)            The patient is here today to have a ER follow up.  Jami Cote LPN......3/10/2017  3:12 PM

## 2018-01-03 NOTE — PROGRESS NOTES
Patient Information     Patient Name MRN Sex Dhiraj Rocha [id number] Male 1945      Progress Notes by Armaan Gallego MD at 3/10/2017  3:10 PM     Author:  Armaan Gallego MD Service:  (none) Author Type:  Physician     Filed:  3/10/2017  3:30 PM Encounter Date:  3/10/2017 Status:  Signed     :  Armaan Gallego MD (Physician)            SUBJECTIVE:    Dhiraj De La Cruz is a 72 y.o. male who presents for follow-up on behavioral issues.    HPI Comments: Eliezer comes in today for follow-up on behavioral problems. As it turns out, most of his behavioral problems may have started and have been escalating ever since he was taken off of his BuSpar by his psychiatrist. I don't have any of those notes or records so I don't know the details as to when or why this medication was discontinued. At any rate, he has been more resistant to cares and has been lashing out more than is typical for him. This has caused great concern as Eliezer is normally quite cooperative at most times. He has been into the emergency room and had a significant evaluation there. All of those notes and tests were reviewed. Nothing much was found. His sister had a question as to whether he could be having some arthritic complaints including his neck or his knee and also was worried about a toenail.      No Known Allergies,   Current Outpatient Prescriptions     Medication  Sig     acetaminophen (MAPAP EXTRA STRENGTH) 500 mg tablet Max acetaminophen dose: 4000mg in 24 hrs.TAKE 2 TABLETS BY MOUTH TWICE DAILY (AM,HS)     allopurinol (ZYLOPRIM) 300 mg tablet TAKE 1 TABLET BY MOUTH DAILY (AM)     AMITIZA 24 mcg capsule TAKE 1 CAPSULE BY MOUTH AFTER 48 HOURS WITHOUT A BOWEL MOVEMENT AS NEEDED     busPIRone (BUSPAR) 5 mg tablet TAKE 1 TABLET BY MOUTH THREE TIMES DAILY (7AM,3PM,8PM)     DME Depends. Wear nightly.     DOC-Q-LACE 100 mg capsule TAKE ONE CAPSULE BY MOUTH TWO TIMES A DAY (AM,8PM)     furosemide (LASIX) 40 mg tablet TAKE 2 TABLETS BY  MOUTH EVERY DAY AT 7AM     haloperidol (HALDOL) 5 mg tablet TAKE AS DIRECTED 1 HOUR BEFORE APPOINTMENT/NAIL TRIMMING     LORazepam (ATIVAN) 2 mg tab TAKE ONE TABLET BY MOUTH AT BEDTIME IF NEEDED. GIVE 30 MINUTES PRIOR TO APPOINTMENTS/NAIL TRIMMING/ETC.     methylcellulose (CITRUCEL) packet Take 1 Packet by mouth once daily. Mix with 8 oz glass of water.     MISCELLANEOUS MEDICAL SUPPLY (GRADUATED COMPRESSION STOCKINGS)      NATURAL FIBER THERAPY powder MIX AND DRINK 1 TABLESPOONFUL IN 8 OUNCES OF WATER DAILY. *ORANGE*     OLANzapine (ZYPREXA) 5 mg tablet TAKE ONE TABLET BY MOUTH AT 8PM     omeprazole (PRILOSEC) 20 mg Delayed-Release capsule TAKE ONE CAPSULE BY MOUTH EVERY DAY AT 3PM     potassium chloride (KLOR-CON 10; K-TAB) 10 mEq Controlled-Release tablet TAKE 1 TABLET BY MOUTH EVERY MORNING     pseudoephedrine (SUDAFED) 30 mg tablet Take 1 tablet by mouth every 4 hours if needed for Nasal Congestion.     SENNA 8.6 mg tablet TAKE 1 TABLET BY MOUTH TWICE DAILY (AM,8PM)     tamsulosin (FLOMAX) 0.4 mg capsule TAKE 1 CAPSULE 30 MINUTES AFTER SUPPER     triamcinolone (ARISTOCORT; KENALOG) 0.1 % cream Apply  topically to affected area(s) 3 times daily.     No current facility-administered medications for this visit.      Medications have been reviewed by me and are current to the best of my knowledge and ability. ,   Past Medical History      Diagnosis   Date     Gastritis       Nonsteroidal induced gastritis with GI bleed      Gout  12/9/2013     H/O aortic aneurysm       Marfan syndrome       Mental retardation       Recurrent acute pancreatitis       Situs inversus       Urinary incontinence       Venous stasis       Chronic venous stasis    ,   Patient Active Problem List       Diagnosis  Date Noted     Behavioral change  03/10/2017     ACP (advance care planning)  12/12/2013     Gout  12/09/2013     DIABETES MELLITUS, TYPE II, BORDERLINE  08/10/2010     CONSTIPATION  07/22/2010     MARFAN'S SYNDROME       AORTIC  ANEURYSM       history of          SITUS INVERSUS       MENTAL RETARDATION       GASTRITIS       nonsteriodal induced gastritis with GI bleed          STASIS DERMATITIS, CHRONIC       legs          URINARY INCONTINENCE       GLAUCOMA       EDEMA       INGUINAL HERNIA, LEFT       ACUTE PANCREATITIS       recurrent         and   Social History      Substance Use Topics        Smoking status:  Former Smoker     Quit date: 1/28/2004     Smokeless tobacco:  Not on file     Alcohol use  No       REVIEW OF SYSTEMS:  Review of Systems   Unable to perform ROS: Patient nonverbal       OBJECTIVE:  /74  Pulse 88    EXAM:   Physical Exam   Constitutional: No distress.   Sitting in the wheelchair   HENT:   Head: Normocephalic.   Neck: Normal range of motion. Neck supple.   Cardiovascular: Normal rate and regular rhythm.    Pulmonary/Chest: Effort normal and breath sounds normal.   Abdominal: Soft. Bowel sounds are normal. There is no tenderness.   Musculoskeletal: He exhibits no edema.   No obvious musculoskeletal pains   Neurological: He is alert.   Skin: Skin is warm and dry.   Nursing note and vitals reviewed.      ASSESSMENT/PLAN:    ICD-10-CM    1. Mental retardation F79    2. Marfan's syndrome Q87.40 busPIRone (BUSPAR) 5 mg tablet   3. Behavioral change R46.89         Plan:  I'm not able to find anything on his recent evaluations including emergency room evaluations, etc. that would explain his behavioral changes. It could be that he has worsened because he is off of the BuSpar. He did not let me do a complete exam to rule out any potential knee or foot problems so that we'll have to be considered at a later date. I am going to have the staff restart BuSpar on him 5 mg 3 times a day which was his previous dose. They will try that for 1 week. If he is improved we will continue that indefinitely. If he is not improved he will need a follow-up visit with psychiatry.

## 2018-01-03 NOTE — TELEPHONE ENCOUNTER
Patient Information     Patient Name MRN Sex Dhiraj Rocha [id number] Male 1945      Telephone Encounter by Geno Chavez RN at 3/2/2017  9:05 AM     Author:  Geno Chavez RN Service:  (none) Author Type:  NURS- Registered Nurse     Filed:  3/2/2017  9:08 AM Encounter Date:  3/2/2017 Status:  Signed     :  Geno Chavez RN (NURS- Registered Nurse)            Medications are not on refill protocol. Unable to complete prescription refill per RN Medication Refill Policy.................... GENO CHAVEZ RN ....................  3/2/2017   9:05 AM        This is a Refill request from: [facility]   Name of Medication: Haldol 5mg  Quantity requested: 10  Last fill date: 16  Last visit with AMARI GARCIA was on: 10/04/2016 in [facility]  PCP:  Amari Garcia MD  Controlled Substance Agreement:  Staff administer   Diagnosis r/t this medication request: one hour before nail clipping     Name of Medication: Ativan 2mg  Quantity requested: 10  Last fill date: 16  L

## 2018-01-03 NOTE — TELEPHONE ENCOUNTER
Patient Information     Patient Name MRN Sex Dhiraj Rocha [id number] Male 1945      Telephone Encounter by Armaan Gallego MD at 2017  4:19 PM     Author:  Armaan Gallego MD Service:  (none) Author Type:  Physician     Filed:  2017  4:19 PM Encounter Date:  2017 Status:  Signed     :  Armaan Gallego MD (Physician)            Marshal

## 2018-01-03 NOTE — TELEPHONE ENCOUNTER
Patient Information     Patient Name MRN Sex Dhiraj Rocha [id number] Male 1945      Telephone Encounter by Jami Cote LPN at 2017  4:14 PM     Author:  Jami Cote LPN Service:  (none) Author Type:  NURS- Licensed Practical Nurse     Filed:  2017  4:18 PM Encounter Date:  2017 Status:  Signed     :  Jami Cote LPN (NURS- Licensed Practical Nurse)            Contacted remy at [facility] and she stated they are looking for orders from Armaan Gallego MD for the patient to wear depends just at night. This seems to be the only time the patient is incontinent.  Jami Cote LPN......2017  4:17 PM

## 2018-01-03 NOTE — TELEPHONE ENCOUNTER
Patient Information     Patient Name MRN Sex Dhiraj Rocha [id number] Male 1945      Telephone Encounter by Ambar Canada RN at 2017  8:36 AM     Author:  Ambar Canada RN Service:  (none) Author Type:  NURS- Registered Nurse     Filed:  2017  8:38 AM Encounter Date:  2017 Status:  Signed     :  Ambar Canada RN (NURS- Registered Nurse)            Office visit in the past 12 months or per provider note.    Last visit with AMARI GARCIA was on: 10/04/2016 in [facility]  Next visit with AMARI GARCIA is on: No future appointment listed with this provider  Next visit with Internal Medicine is on: No future appointment listed in this department    Max refill for 12 months from last office visit or per provider note.    Prescription refilled per RN Medication Refill Policy.................... Ambar Canada RN ....................  2017   8:38 AM

## 2018-01-03 NOTE — TELEPHONE ENCOUNTER
Patient Information     Patient Name MRN Sex Dhiraj Rocha [id number] Male 1945      Telephone Encounter by Jami Cote LPN at 2017  4:28 PM     Author:  Jami Cote LPN Service:  (none) Author Type:  NURS- Licensed Practical Nurse     Filed:  2017  4:29 PM Encounter Date:  2017 Status:  Signed     :  Jami Cote LPN (NURS- Licensed Practical Nurse)            Contacted remy at [facility], she stated they need a prescription sent over to [facility].   Jami Cote LPN......2017  4:28 PM

## 2018-01-03 NOTE — TELEPHONE ENCOUNTER
Patient Information     Patient Name MRN Sex Dhiraj Rocha [id number] Male 1945      Telephone Encounter by Chrissie Travis RN at 2017  4:11 PM     Author:  Chrissie Travis RN Service:  (none) Author Type:  NURS- Registered Nurse     Filed:  2017  4:18 PM Encounter Date:  2017 Status:  Signed     :  Chrissie Travis RN (NURS- Registered Nurse)            GOUT    Office visit in the past 12 months or per provider note.    Last visit with AMARI GARCIA was on: 10/04/2016 in [facility]  Next visit with AMARI GARCIA is on: No future appointment listed with this provider  Next visit with Internal Medicine is on: No future appointment listed in this department    Max refill for 12 months from last office visit or per provider note.    Spoke with [facility] as patient should refills on all other medications until May except allopurinol.  She states that only the request for allopurinol should have been sent.    Prescription refilled per RN Medication Refill Policy.................... CHRISSIE TRAVIS RN ....................  2017   4:16 PM

## 2018-01-03 NOTE — TELEPHONE ENCOUNTER
"Patient Information     Patient Name MRN Dhiraj Blake [id number] Male 1945      Telephone Encounter by Chrissie Travis RN at 3/13/2017  4:00 PM     Author:  Chrissie Travis RN Service:  (none) Author Type:  NURS- Registered Nurse     Filed:  3/13/2017  4:04 PM Encounter Date:  3/13/2017 Status:  Signed     :  Chrissie Travis RN (NURS- Registered Nurse)            Note sent from [facility] in regards to Buspirone HCL 5 mg take one tablet by mouth 3 times daily. #30 x 1 refill sent 3/10/17.      Note: \"could we please get #90 tabs if this is three times a day? Thank you! [facility].\"    CHRISSIE TRAVIS RN ....................  3/13/2017   4:03 PM              "

## 2018-01-04 NOTE — TELEPHONE ENCOUNTER
Patient Information     Patient Name MRN Sex Dhiraj Rocha [id number] Male 1945      Telephone Encounter by Chrissie Dobbins RN at 2017  2:53 PM     Author:  Chrissie Dobbins RN Service:  (none) Author Type:  NURS- Registered Nurse     Filed:  2017  2:54 PM Encounter Date:  2017 Status:  Signed     :  Chirssie Dobbins RN (NURS- Registered Nurse)            This is a Refill request from: [facility]  Name of Medication: Olenzapine 5 mg  Quantity requested: 31  Last fill date: 3/29/17  Last visit with AMARI GARCIA was on: 03/10/2017 in [facility]  PCP:  Amari Garcia MD  Diagnosis r/t this medication request: Marfans Syndrome     Unable to complete prescription refill per RN Medication Refill Policy.................... CHRISSIE DOBBINS RN ....................  2017   2:53 PM

## 2018-01-04 NOTE — TELEPHONE ENCOUNTER
Patient Information     Patient Name MRN Sex Dhiraj Rocha [id number] Male 1945      Telephone Encounter by Chrissie Travis RN at 2017  2:42 PM     Author:  Chrissie Travis RN Service:  (none) Author Type:  NURS- Registered Nurse     Filed:  2017  2:54 PM Encounter Date:  2017 Status:  Signed     :  Chrissie Travis RN (NURS- Registered Nurse)            Proton Pump Inhibitors    Office visit in the past 12 months or per provider note.    Last visit with AMARI GARCIA was on: 03/10/2017 in [facility]  Next visit with AMARI GARCIA is on: No future appointment listed with this provider  Next visit with Internal Medicine is on: No future appointment listed in this department    Max refill for 12 months from last office visit or per provider note.  Diuretics (may be prescribed for edema)    Office visit in the past 12 months or per provider note.    Lab testing requirements:  Creatinine and Potassium annually, if ordering lab, order BMP.  CREATININE (mg/dL)    Date Value   2017 1.04     POTASSIUM (mmol/L)    Date Value   2017 3.7     BP Readings from Last 4 Encounters:    03/10/17 122/74   17 103/70   10/04/16 108/70   16 110/68       Review last provider visit note.  If BP reviewed and plan is noted, can refill.  Max refill for 12 months from last office visit or per provider note.    BPH    Office visit in the past 12 months or per provider note.    Max refill for 12 months from last office visit or per provider note.    POTASSIUM    Office visit in the past 12 months or per provider note.    Lab test requirements:  Annual potassium level.  POTASSIUM (mmol/L)    Date Value   2017 3.7       Max refill for 12 months from last office visit or per provider note.    Allopurinol noted as refilled on 17 #90 x 2 refills    Prescription refilled per RN Medication Refill Policy.................... CHRISSIE TRAVIS RN ....................  2017    2:49 PM

## 2018-01-05 NOTE — TELEPHONE ENCOUNTER
Patient Information     Patient Name MRN Sex Dhiraj Rocha [id number] Male 1945      Telephone Encounter by Geno Chavez RN at 2017  9:19 AM     Author:  Geno Chavez RN Service:  (none) Author Type:  NURS- Registered Nurse     Filed:  2017  9:20 AM Encounter Date:  2017 Status:  Signed     :  Geno Chavez RN (NURS- Registered Nurse)            Office visit in the past 12 months or per provider note.    Last visit with AMARI GARCIA was on: 03/10/2017 in [facility]  Next visit with AMARI GARCIA is on: No future appointment listed with this provider  Next visit with Internal Medicine is on: No future appointment listed in this department    Max refill for 12 months from last office visit or per provider note.  Prescription refilled per RN Medication Refill Policy.................... GENO CHAVEZ RN ....................  2017   9:20 AM

## 2018-01-23 ENCOUNTER — COMMUNICATION - GICH (OUTPATIENT)
Dept: INTERNAL MEDICINE | Facility: OTHER | Age: 73
End: 2018-01-23

## 2018-01-23 DIAGNOSIS — M19.90 OSTEOARTHRITIS: ICD-10-CM

## 2018-01-23 DIAGNOSIS — Q87.40 MARFAN'S SYNDROME: ICD-10-CM

## 2018-01-26 VITALS
HEART RATE: 88 BPM | HEART RATE: 60 BPM | SYSTOLIC BLOOD PRESSURE: 130 MMHG | SYSTOLIC BLOOD PRESSURE: 122 MMHG | DIASTOLIC BLOOD PRESSURE: 74 MMHG | DIASTOLIC BLOOD PRESSURE: 78 MMHG

## 2018-01-26 VITALS — SYSTOLIC BLOOD PRESSURE: 126 MMHG | HEART RATE: 96 BPM | DIASTOLIC BLOOD PRESSURE: 84 MMHG

## 2018-02-09 VITALS — DIASTOLIC BLOOD PRESSURE: 78 MMHG | SYSTOLIC BLOOD PRESSURE: 122 MMHG | HEART RATE: 108 BPM | TEMPERATURE: 99.1 F

## 2018-02-12 ENCOUNTER — DOCUMENTATION ONLY (OUTPATIENT)
Dept: FAMILY MEDICINE | Facility: OTHER | Age: 73
End: 2018-02-12

## 2018-02-12 PROBLEM — F79 MENTAL RETARDATION: Status: ACTIVE | Noted: 2018-02-12

## 2018-02-12 PROBLEM — R60.9 EDEMA: Status: ACTIVE | Noted: 2018-02-12

## 2018-02-12 PROBLEM — H40.9 GLAUCOMA: Status: ACTIVE | Noted: 2018-02-12

## 2018-02-12 PROBLEM — K85.90 ACUTE PANCREATITIS: Status: ACTIVE | Noted: 2018-02-12

## 2018-02-12 PROBLEM — K29.70 GASTRITIS: Status: ACTIVE | Noted: 2018-02-12

## 2018-02-12 PROBLEM — K40.90 INGUINAL HERNIA, LEFT: Status: ACTIVE | Noted: 2018-02-12

## 2018-02-12 PROBLEM — R32 URINARY INCONTINENCE: Status: ACTIVE | Noted: 2018-02-12

## 2018-02-12 PROBLEM — I87.2 VENOUS (PERIPHERAL) INSUFFICIENCY: Status: ACTIVE | Noted: 2018-02-12

## 2018-02-12 PROBLEM — I71.9 AORTIC ANEURYSM (H): Status: ACTIVE | Noted: 2018-02-12

## 2018-02-12 PROBLEM — Q89.3 SITUS INVERSUS: Status: ACTIVE | Noted: 2018-02-12

## 2018-02-12 PROBLEM — Q87.40 MARFAN'S SYNDROME: Status: ACTIVE | Noted: 2018-02-12

## 2018-02-12 RX ORDER — OLANZAPINE 5 MG/1
TABLET ORAL
COMMUNITY
Start: 2017-04-25 | End: 2018-04-24

## 2018-02-12 RX ORDER — HALOPERIDOL 5 MG/1
TABLET ORAL
COMMUNITY
Start: 2017-03-02 | End: 2018-11-09

## 2018-02-12 RX ORDER — TAMSULOSIN HYDROCHLORIDE 0.4 MG/1
CAPSULE ORAL
COMMUNITY
Start: 2018-01-02 | End: 2018-08-07

## 2018-02-12 RX ORDER — POTASSIUM CHLORIDE 750 MG/1
TABLET, EXTENDED RELEASE ORAL
COMMUNITY
Start: 2018-01-02 | End: 2018-11-09

## 2018-02-12 RX ORDER — ACETAMINOPHEN 500 MG
TABLET ORAL
COMMUNITY
Start: 2017-07-20 | End: 2018-11-09

## 2018-02-12 RX ORDER — LORAZEPAM 2 MG/1
TABLET ORAL
COMMUNITY
Start: 2017-03-02 | End: 2018-11-09

## 2018-02-12 RX ORDER — LATANOPROST 50 UG/ML
1 SOLUTION/ DROPS OPHTHALMIC AT BEDTIME
COMMUNITY
Start: 2017-12-05 | End: 2021-12-13

## 2018-02-12 RX ORDER — FUROSEMIDE 40 MG
2 TABLET ORAL DAILY
COMMUNITY
Start: 2018-01-02 | End: 2018-12-18

## 2018-02-12 RX ORDER — ALLOPURINOL 300 MG/1
TABLET ORAL
COMMUNITY
Start: 2017-10-05 | End: 2018-06-14

## 2018-02-12 RX ORDER — TIMOLOL MALEATE 5 MG/ML
SOLUTION OPHTHALMIC
COMMUNITY
Start: 2017-11-07 | End: 2021-12-13

## 2018-02-12 RX ORDER — DOCUSATE SODIUM 100 MG/1
CAPSULE, LIQUID FILLED ORAL
COMMUNITY
Start: 2017-08-11 | End: 2018-07-20

## 2018-02-12 RX ORDER — BUSPIRONE HYDROCHLORIDE 5 MG/1
TABLET ORAL
COMMUNITY
Start: 2017-07-25 | End: 2018-05-21

## 2018-02-12 RX ORDER — SENNOSIDES A AND B 8.6 MG/1
TABLET, FILM COATED ORAL
COMMUNITY
Start: 2017-10-05 | End: 2018-09-19

## 2018-02-12 NOTE — TELEPHONE ENCOUNTER
Patient Information     Patient Name MRN Sex Dhiraj Rocha [id number] Male 1945      Telephone Encounter by Chrissie Travis RN at 2018 10:47 AM     Author:  Chrissie Travis RN Service:  (none) Author Type:  NURS- Registered Nurse     Filed:  2018 10:51 AM Encounter Date:  2017 Status:  Signed     :  Chrissie Travis RN (NURS- Registered Nurse)            Proton Pump Inhibitors    Office visit in the past 12 months or per provider note.    Last visit with AMARI GARCIA was on: 10/09/2017 in [facility]  Next visit with AMARI GARCIA is on: No future appointment listed with this provider  Next visit with Internal Medicine is on: No future appointment listed in this department    Max refill for 12 months from last office visit or per provider note.  Diuretics (may be prescribed for edema)    Office visit in the past 12 months or per provider note.    Lab testing requirements:  Creatinine and Potassium annually, if ordering lab, order BMP.  CREATININE (mg/dL)    Date Value   10/09/2017 0.92     POTASSIUM (mmol/L)    Date Value   10/09/2017 3.7     BP Readings from Last 4 Encounters:    17 122/78   10/09/17 126/84   17 130/78   03/10/17 122/74       Review last provider visit note.  If BP reviewed and plan is noted, can refill.  Max refill for 12 months from last office visit or per provider note.  Potassium  Office visit in the past 12 months or per provider note.    Lab test requirements:  Annual potassium level.  POTASSIUM (mmol/L)    Date Value   10/09/2017 3.7       Max refill for 12 months from last office visit or per provider note.  BPH    Office visit in the past 12 months or per provider note.    Max refill for 12 months from last office visit or per provider note.    Per LOV note patient to follow up annually  Prescription refilled per RN Medication Refill Policy.................... CHRISSIE TRAVIS RN ....................  2018   10:50 AM

## 2018-02-12 NOTE — PROGRESS NOTES
Patient Information     Patient Name MRN Sex Dhiraj Rocha [id number] Male 1945      Progress Notes by Brea Whyte NP at 2017 10:45 AM     Author:  Brea Whyte NP Service:  (none) Author Type:  PHYS- Nurse Practitioner     Filed:  2017  3:00 PM Encounter Date:  2017 Status:  Signed     :  Brea Whyte NP (PHYS- Nurse Practitioner)            HPI:  Nursing Notes:   Rahel New  2017 10:55 AM  Signed  Patient presents to the clinic for unproductive cough x1 week. Patient's caregiver reports patient having a fluctuating body temperature of 98.4 to 99.9 degrees that past week also and says the patient seems to be sleeping more than usual. He has taken mucinex with some relief.  Rahel DUNLAP CMA.......2017..10:46 AM      Dhiraj De La Cruz is a 72 y.o. male who presents to clinic today with care provider with cough for about a week at first it sounded phlegmy and persistent. Temperatures are fluctuating, but less then 100. Eating and drinking normally. Sleeping a lot which is normal for him. No wheezing or SOB. Eliezer is nonverbal about ear pain or sore throat. Treating cough with Mucinex.    Past Medical History:     Diagnosis  Date     Gastritis     Nonsteroidal induced gastritis with GI bleed      Gout 2013     H/O aortic aneurysm      Marfan syndrome      Mental retardation      Recurrent acute pancreatitis      Situs inversus      Urinary incontinence      Venous stasis     Chronic venous stasis      Past Surgical History:      Procedure  Laterality Date     OPEN CHOLECYSTECTOMY  2010     Social History      Substance Use Topics        Smoking status:  Former Smoker     Quit date: 2004     Smokeless tobacco:  Never Used     Alcohol use  No     Current Outpatient Prescriptions       Medication  Sig Dispense Refill     allopurinol (ZYLOPRIM) 300 mg tablet TAKE 1 TABLET BY MOUTH DAILY (AM) 90 tablet 2     busPIRone  (BUSPAR) 5 mg tablet TAKE 1 TABLET BY MOUTH THREE TIMES DAILY (7AM,3PM,8PM) 90 tablet 11     DOC-Q-LACE 100 mg capsule TAKE ONE CAPSULE BY MOUTH TWO TIMES A DAY (AM,8PM) 180 capsule 3     FLUCELVAX QUAD 4828-4451, PF, 60 mcg (15 mcg x 4)/0.5 mL injection UTD  0     furosemide (LASIX) 40 mg tablet TAKE 2 TABLETS BY MOUTH EVERY DAY AT 7AM 180 tablet 2     haloperidol (HALDOL) 5 mg tablet TAKE AS DIRECTED 1 HOUR BEFORE APPOINTMENT/NAIL TRIMMING 10 tablet 0     latanoprost (XALATAN) 0.005 % ophthalmic solution Place 1 Drop into both eyes at bedtime.  11     LORazepam (ATIVAN) 2 mg tab TAKE ONE TABLET BY MOUTH AT BEDTIME IF NEEDED. GIVE 30 MINUTES PRIOR TO APPOINTMENTS/NAIL TRIMMING/ETC. 10 tablet 0     MAPAP EXTRA STRENGTH 500 mg tablet TAKE 2 TABLETS BY MOUTH TWICE DAILY (AM,HS) *BREAK IN HALF* 360 tablet 1     methylcellulose (CITRUCEL) packet Take 1 Packet by mouth once daily. Mix with 8 oz glass of water.  0     NATURAL FIBER THERAPY powder MIX AND DRINK 1 TABLESPOONFUL IN 8 OUNCES OF WATER DAILY ORANGE  8     OLANzapine (ZYPREXA) 5 mg tablet TAKE ONE TABLET BY MOUTH AT 8PM 31 tablet 11     omeprazole (PRILOSEC) 20 mg Delayed-Release capsule TAKE ONE CAPSULE BY MOUTH EVERY DAY AT 3PM 90 capsule 2     potassium chloride (KLOR-CON 10; K-TAB) 10 mEq Controlled-Release tablet TAKE 1 TABLET BY MOUTH EVERY MORNING 90 tablet 2     SENNA 8.6 mg tablet TAKE 1 TABLET BY MOUTH TWICE DAILY (AM,8PM) 186 tablet 3     Shower Chair For home use. roling shower chair 1 Device 0     tamsulosin (FLOMAX) 0.4 mg capsule TAKE 1 CAPSULE 30 MINUTES AFTER SUPPER 90 capsule 2     timolol (TIMOPTIC -XE) 0.5 % ophthalmic gel-forming INSTILL 1 DROP IN BOTH EYES EVERY MORNING  11     No current facility-administered medications for this visit.      Medications have been reviewed by me and are current to the best of my knowledge and ability.    No Known Allergies    ROS:  Refer to HPI    /78 (Cuff Site: Left Arm, Position: Sitting, Cuff  Size: Adult Regular)  Pulse (!) 108  Temp 99.1  F (37.3  C) (Tympanic)     EXAM:  General Appearance: Well appearing male,  appropriate appearance for age. No acute distress  Ears: Left TM with bony landmarks appreciated with cone of light, no erythema, no effusion, no bulging, no purulence.  Right TM with bony landmarks appreciated with cone of light, no erythema, no effusion, no bulging, no purulence.   Left auditory canal clear, Right auditory canal clear, normal external ears, non tender.  Orophayrnx: moist mucous membranes, posterior pharynx, tonsils without hypertrophy  Neck: supple without adenopathy  Respiratory: normal chest wall and respirations.  Normal effort.  Clear to auscultation bilaterally  Cardiac: RRR   Psychological: normal affect, alert and pleasant    ASSESSMENT/PLAN:    ICD-10-CM    1. Pneumonia of right lower lobe due to infectious organism (HC) J18.1 azithromycin (ZITHROMAX) 250 mg tablet   2. Cough R05 XR CHEST 2 VIEWS PA AND LATERAL   Ongoing cough  On exam: well appearing male without fever, lungs clear to ausculation, tonsils without erythema, tms without erythema  Chest xray obtained and reviewed-right lower lobe infiltrate noted  Diagnosis: Right lower lobe pneumonia  Treat with Zithromax 500 mgs today, then 250 mgs days 2-5  Follow up as needed    Patient Instructions      Index Serbian   Pneumonia   ________________________________________________________________________  KEY POINTS    Pneumonia is an infection in your lungs caused by a viral or bacterial infection or by breathing food or fluid into your lungs.    Treatment may include oxygen, a machine to help you breathe, medicines, and or a procedure to clear your lungs.    Take care of yourself by avoiding smoking and smoky places, polluted air, dust, fumes, and mold. Wash your hands often, and get all recommended vaccinations including a flu shot each  year.  ________________________________________________________________________  What is pneumonia?  Pneumonia is an infection in your lungs. Inside the lungs are tiny air sacs, called alveoli. These sacs help the oxygen you breathe in get into your bloodstream. Pneumonia causes swelling and irritation of the air sacs in your lungs. The air sacs may fill with fluid or pus and cause you to cough or have trouble breathing. There may be less oxygen in your blood, and your body may not get enough oxygen to work properly.  What is the cause?  Pneumonia is usually caused by a viral or bacterial infection. You may be more at risk for pneumonia if:    You have another disease, such as diabetes, chronic bronchitis, or cancer.    You have recently had surgery, especially if you had general anesthesia, because when you are under anesthesia, you do not breathe deeply, and fluid may build up in your lungs.    You are over age 65.    You have a problem, such as a stroke, that makes it hard for you to swallow. This increases your risk of breathing food or fluid into your lungs, which can cause irritation and later an infection and pneumonia.  What are the symptoms?  Symptoms may include:    Fever and chills    Feeling short of breath    Chest pain, especially when you take a deep breath    Cough that may bring up rust-colored or bloody mucus  Not all types of pneumonia cause a high fever. The only symptom may be several days or weeks of dry cough, often with extreme tiredness. In older adults, the only early sign of pneumonia may be confusion or being less active.  How is it diagnosed?  Your healthcare provider will ask about your symptoms and medical history and examine you. Tests may include:    Blood tests    Sputum culture, which is a test of a sample of mucus coughed up from deep in your lungs    Chest X-ray  How is it treated?  If your symptoms are very mild, you may need only rest, fluids, and follow up appointments with  your healthcare provider. Your provider may prescribe antibiotics or other medicine, depending on the cause of your pneumonia.  Based on your medical history and if you are very ill, you may need to be in the hospital. Treatment may include:    Giving you oxygen    Giving you IV fluids and medicines such as antibiotics to treat infection and inhaled medicines to open up the airway    Having a tube in your throat and a machine to help you breathe and to make sure you are getting enough oxygen  How can I take care of myself?  Follow the full course of treatment prescribed by your healthcare provider. In addition:    If you are taking an antibiotic, take the medicine for as long as your healthcare provider prescribes, even if you feel better. If you stop taking the medicine too soon, you may not kill all of the bacteria and you may get sick again.    Drink more liquids, especially water or tea, every day to help you cough up mucus more easily unless your provider has told you to limit your fluids.    Cough up mucus as much as possible. Use cough medicine only if your provider recommends it.    Don t smoke, and stay away from others who are smoking.    Avoid breathing dust and chemical fumes.    Get extra rest.    Use a humidifier to put more moisture in the air. Avoid steam vaporizers because they can cause burns. Be sure to keep the humidifier clean as recommended in the 's instructions. It's important to keep bacteria and mold from growing in the water container.    Take nonprescription medicine, such as acetaminophen, ibuprofen, or naproxen to treat pain and fever. Read the label and take as directed. Unless recommended by your healthcare provider, you should not take these medicines for more than 10 days.    Nonsteroidal anti-inflammatory medicines (NSAIDs), such as ibuprofen, naproxen, and aspirin, may cause stomach bleeding and other problems. These risks increase with age.    Acetaminophen may cause  liver damage or other problems. Unless recommended by your provider, don't take more than 3000 milligrams (mg) in 24 hours. To make sure you don t take too much, check other medicines you take to see if they also contain acetaminophen. Ask your provider if you need to avoid drinking alcohol while taking this medicine.    Contact your healthcare provider if you have new or worsening symptoms.  Ask your provider:    How and when you will get your test results    How long it will take to recover    If there are activities you should avoid and when you can return to your normal activities    How to take care of yourself at home    What symptoms or problems you should watch for and what to do if you have them  Make sure you know when you should come back for a checkup. Keep all appointments for provider visits or tests.  How can I help prevent pneumonia?  To reduce your risk of getting a lung infection:    Wash your hands often and especially after using the restroom, coughing, sneezing, or blowing your nose. Also wash your hands before eating or touching your eyes.    Stay away from people who are sick as much as possible.    Take care of your health. Try to get at least 7 to 9 hours of sleep each night. Eat a healthy diet and try to keep a healthy weight.    If you smoke, try to quit. Ask your healthcare provider for advice and help with quitting.    If you want to drink alcohol, ask your healthcare provider how much is safe for you to drink.    Learn ways to manage stress.    Stay physically active as advised by your provider.    Get all recommended vaccination including a flu shot each year.    Ask your healthcare provider if you should get the pneumococcal shot. This shot helps prevent serious complications of pneumonia such as an infection of the blood or brain.  Developed by [employer].  Adult Advisor 2017.2 published by [employer].  Last modified: 2017-04-12  Last reviewed: 2017-04-03  This content is reviewed  periodically and is subject to change as new health information becomes available. The information is intended to inform and educate and is not a replacement for medical evaluation, advice, diagnosis or treatment by a healthcare professional.  References   Adult Advisor 2017.2 Index    Copyright   2017 [employer], a division of [employer] All rights reserved.

## 2018-02-12 NOTE — PATIENT INSTRUCTIONS
Patient Information     Patient Name MRN Dhiraj Blake [id number] Male 1945      Patient Instructions by Brea Whyte NP at 2017 10:45 AM     Author:  Brea Whyte NP Service:  (none) Author Type:  PHYS- Nurse Practitioner     Filed:  2017 11:37 AM Encounter Date:  2017 Status:  Signed     :  Brea Whyte NP (PHYS- Nurse Practitioner)               Index Marshallese   Pneumonia   ________________________________________________________________________  KEY POINTS    Pneumonia is an infection in your lungs caused by a viral or bacterial infection or by breathing food or fluid into your lungs.    Treatment may include oxygen, a machine to help you breathe, medicines, and or a procedure to clear your lungs.    Take care of yourself by avoiding smoking and smoky places, polluted air, dust, fumes, and mold. Wash your hands often, and get all recommended vaccinations including a flu shot each year.  ________________________________________________________________________  What is pneumonia?  Pneumonia is an infection in your lungs. Inside the lungs are tiny air sacs, called alveoli. These sacs help the oxygen you breathe in get into your bloodstream. Pneumonia causes swelling and irritation of the air sacs in your lungs. The air sacs may fill with fluid or pus and cause you to cough or have trouble breathing. There may be less oxygen in your blood, and your body may not get enough oxygen to work properly.  What is the cause?  Pneumonia is usually caused by a viral or bacterial infection. You may be more at risk for pneumonia if:    You have another disease, such as diabetes, chronic bronchitis, or cancer.    You have recently had surgery, especially if you had general anesthesia, because when you are under anesthesia, you do not breathe deeply, and fluid may build up in your lungs.    You are over age 65.    You have a problem, such as a  stroke, that makes it hard for you to swallow. This increases your risk of breathing food or fluid into your lungs, which can cause irritation and later an infection and pneumonia.  What are the symptoms?  Symptoms may include:    Fever and chills    Feeling short of breath    Chest pain, especially when you take a deep breath    Cough that may bring up rust-colored or bloody mucus  Not all types of pneumonia cause a high fever. The only symptom may be several days or weeks of dry cough, often with extreme tiredness. In older adults, the only early sign of pneumonia may be confusion or being less active.  How is it diagnosed?  Your healthcare provider will ask about your symptoms and medical history and examine you. Tests may include:    Blood tests    Sputum culture, which is a test of a sample of mucus coughed up from deep in your lungs    Chest X-ray  How is it treated?  If your symptoms are very mild, you may need only rest, fluids, and follow up appointments with your healthcare provider. Your provider may prescribe antibiotics or other medicine, depending on the cause of your pneumonia.  Based on your medical history and if you are very ill, you may need to be in the hospital. Treatment may include:    Giving you oxygen    Giving you IV fluids and medicines such as antibiotics to treat infection and inhaled medicines to open up the airway    Having a tube in your throat and a machine to help you breathe and to make sure you are getting enough oxygen  How can I take care of myself?  Follow the full course of treatment prescribed by your healthcare provider. In addition:    If you are taking an antibiotic, take the medicine for as long as your healthcare provider prescribes, even if you feel better. If you stop taking the medicine too soon, you may not kill all of the bacteria and you may get sick again.    Drink more liquids, especially water or tea, every day to help you cough up mucus more easily unless your  provider has told you to limit your fluids.    Cough up mucus as much as possible. Use cough medicine only if your provider recommends it.    Don t smoke, and stay away from others who are smoking.    Avoid breathing dust and chemical fumes.    Get extra rest.    Use a humidifier to put more moisture in the air. Avoid steam vaporizers because they can cause burns. Be sure to keep the humidifier clean as recommended in the 's instructions. It's important to keep bacteria and mold from growing in the water container.    Take nonprescription medicine, such as acetaminophen, ibuprofen, or naproxen to treat pain and fever. Read the label and take as directed. Unless recommended by your healthcare provider, you should not take these medicines for more than 10 days.    Nonsteroidal anti-inflammatory medicines (NSAIDs), such as ibuprofen, naproxen, and aspirin, may cause stomach bleeding and other problems. These risks increase with age.    Acetaminophen may cause liver damage or other problems. Unless recommended by your provider, don't take more than 3000 milligrams (mg) in 24 hours. To make sure you don t take too much, check other medicines you take to see if they also contain acetaminophen. Ask your provider if you need to avoid drinking alcohol while taking this medicine.    Contact your healthcare provider if you have new or worsening symptoms.  Ask your provider:    How and when you will get your test results    How long it will take to recover    If there are activities you should avoid and when you can return to your normal activities    How to take care of yourself at home    What symptoms or problems you should watch for and what to do if you have them  Make sure you know when you should come back for a checkup. Keep all appointments for provider visits or tests.  How can I help prevent pneumonia?  To reduce your risk of getting a lung infection:    Wash your hands often and especially after using  the restroom, coughing, sneezing, or blowing your nose. Also wash your hands before eating or touching your eyes.    Stay away from people who are sick as much as possible.    Take care of your health. Try to get at least 7 to 9 hours of sleep each night. Eat a healthy diet and try to keep a healthy weight.    If you smoke, try to quit. Ask your healthcare provider for advice and help with quitting.    If you want to drink alcohol, ask your healthcare provider how much is safe for you to drink.    Learn ways to manage stress.    Stay physically active as advised by your provider.    Get all recommended vaccination including a flu shot each year.    Ask your healthcare provider if you should get the pneumococcal shot. This shot helps prevent serious complications of pneumonia such as an infection of the blood or brain.  Developed by [employer].  Adult Advisor 2017.2 published by [employer].  Last modified: 2017-04-12  Last reviewed: 2017-04-03  This content is reviewed periodically and is subject to change as new health information becomes available. The information is intended to inform and educate and is not a replacement for medical evaluation, advice, diagnosis or treatment by a healthcare professional.  References   Adult Advisor 2017.2 Index    Copyright   2017 [employer], a division of [employer] All rights reserved.

## 2018-02-13 NOTE — TELEPHONE ENCOUNTER
Patient Information     Patient Name MRN Sex Dhiraj Rocha [id number] Male 1945      Telephone Encounter by Pooja Duarte RN at 2018  1:53 PM     Author:  Pooja Duarte RN Service:  (none) Author Type:  NURS- Registered Nurse     Filed:  2018  2:16 PM Encounter Date:  2018 Status:  Signed     :  Pooja Duarte RN (NURS- Registered Nurse)            Pharmacy is requesting refill of Buspar and Mapap. Buspar not on refill protocol.  Unable to complete prescription refill per RN Medication Refill Policy.................... Pooja Duarte RN ....................  2018   2:11 PM    This is a Refill request from: [facility]  Medication:busPIRone (BUSPAR) 5 mg tablet    Qty:90 tablet   Ref:11  Start:2017   End:              Route:                      KENNY:No   Class:Historical Med    Sig:TAKE 1 TABLET BY MOUTH THREE TIMES DAILY   Quantity requested: 90  Last visit with AMARI GALLEGO was on: 10/09/2017 in [facility]  PCP:  Amari Gallego MD  Diagnosis r/t this medication request: Marfan's syndrome       MAPAP filled  Office visit in the past 12 months or per provider note.    Last visit with AMARI GALLEGO was on: 10/09/2017 in [facility]  Next visit with AMARI GALLEGO is on: No future appointment listed with this provider  Next visit with Internal Medicine is on: No future appointment listed in this department    Max refill for 12 months from last office visit or per provider note.  Prescription refilled per RN Medication Refill Policy.................... Pooja Duarte RN ....................  2018   1:54 PM      .

## 2018-03-22 ENCOUNTER — OFFICE VISIT (OUTPATIENT)
Dept: FAMILY MEDICINE | Facility: OTHER | Age: 73
End: 2018-03-22
Attending: NURSE PRACTITIONER
Payer: COMMERCIAL

## 2018-03-22 ENCOUNTER — HOSPITAL ENCOUNTER (OUTPATIENT)
Dept: GENERAL RADIOLOGY | Facility: OTHER | Age: 73
Discharge: HOME | End: 2018-03-22
Attending: NURSE PRACTITIONER | Admitting: NURSE PRACTITIONER
Payer: COMMERCIAL

## 2018-03-22 VITALS
RESPIRATION RATE: 24 BRPM | TEMPERATURE: 99.9 F | SYSTOLIC BLOOD PRESSURE: 118 MMHG | HEART RATE: 84 BPM | DIASTOLIC BLOOD PRESSURE: 88 MMHG

## 2018-03-22 DIAGNOSIS — R05.9 COUGH: Primary | ICD-10-CM

## 2018-03-22 PROCEDURE — 71045 X-RAY EXAM CHEST 1 VIEW: CPT

## 2018-03-22 PROCEDURE — G0463 HOSPITAL OUTPT CLINIC VISIT: HCPCS

## 2018-03-22 PROCEDURE — 99213 OFFICE O/P EST LOW 20 MIN: CPT | Performed by: NURSE PRACTITIONER

## 2018-03-22 PROCEDURE — G0463 HOSPITAL OUTPT CLINIC VISIT: HCPCS | Mod: 25

## 2018-03-22 ASSESSMENT — ENCOUNTER SYMPTOMS: COUGH: 1

## 2018-03-22 NOTE — MR AVS SNAPSHOT
"              After Visit Summary   3/22/2018    Dhiraj De La Cruz    MRN: [id number]           Patient Information     Date Of Birth          1945        Visit Information        Provider Department      3/22/2018 2:30 PM Brea Whyte APRN CNP [facility]        Today's Diagnoses     Cough    -  1       Follow-ups after your visit        Follow-up notes from your care team     Return if symptoms worsen or fail to improve.      Who to contact     If you have questions or need follow up information about today's clinic visit or your schedule please contact [facility] directly at [phone].  Normal or non-critical lab and imaging results will be communicated to you by Yoopieshart, letter or phone within 4 business days after the clinic has received the results. If you do not hear from us within 7 days, please contact the clinic through Yoopieshart or phone. If you have a critical or abnormal lab result, we will notify you by phone as soon as possible.  Submit refill requests through [employer] or call your pharmacy and they will forward the refill request to us. Please allow 3 business days for your refill to be completed.          Additional Information About Your Visit        MyChart Information     [employer] lets you send messages to your doctor, view your test results, renew your prescriptions, schedule appointments and more. To sign up, go to [email or web address] . Click on \"Log in\" on the left side of the screen, which will take you to the Welcome page. Then click on \"Sign up Now\" on the right side of the page.     You will be asked to enter the access code listed below, as well as some personal information. Please follow the directions to create your username and password.     Your access code is: [email or web address]  Expires: 2018  4:00 PM     Your access code will  in 90 days. If you need help or a new code, please call your [place] clinic or " [phone].        Care EveryWhere ID     This is your Care EveryWhere ID. This could be used by other organizations to access your [place] medical records  [id number]        Your Vitals Were     Pulse Temperature Respirations             84 99.9  F (37.7  C) (Tympanic) 24          Blood Pressure from Last 3 Encounters:   03/22/18 118/88   12/19/17 122/78   10/09/17 126/84    Weight from Last 3 Encounters:   02/09/16 165 lb (74.8 kg)   01/25/16 165 lb (74.8 kg)   09/29/15 165 lb (74.8 kg)              We Performed the Following     XR Chest 1 View        Primary Care Provider Office Phone # Fax #    Armaan Gallego [phone] [address] [facility] [address]        Equal Access to Services     [facility] HospitalFELICITAS : Hadii gerald hurley hadasho Soomaali, waaxda luqadaha, qaybta kaalmada adelonyarishabh, josette carreno . So [facility] [phone].    ATENCIÓN: Si habla español, tiene a olvera disposición servicios gratuitos de asistencia lingüística. Lewis al [phone].    We comply with applicable federal civil rights laws and [place] laws. We do not discriminate on the basis of race, color, national origin, age, disability, sex, sexual orientation, or gender identity.            Thank you!     Thank you for choosing [facility]  for your care. Our goal is always to provide you with excellent care. Hearing back from our patients is one way we can continue to improve our services. Please take a few minutes to complete the written survey that you may receive in the mail after your visit with us. Thank you!             Your Updated Medication List - Protect others around you: Learn how to safely use, store and throw away your medicines at www.disposemymeds.org.          This list is accurate as of 3/22/18  4:00 PM.  Always use your most recent med list.                   Brand Name Dispense Instructions for use Diagnosis    allopurinol 300 MG tablet     ZYLOPRIM     TAKE 1 TABLET BY MOUTH DAILY (AM)        busPIRone 5 MG tablet    BUSPAR     TAKE 1 TABLET BY MOUTH THREE TIMES DAILY (7AM,3PM,8PM)        DOCQLACE 100 MG capsule   Generic drug:  docusate sodium           FLUCELVAX QUADRIVALENT 0.5 ML Oralia   Generic drug:  Influenza Vac Subunit Quad      UTD        furosemide 40 MG tablet    LASIX     Take 2 tablets by mouth daily At 7 AM        haloperidol 5 MG tablet    HALDOL          latanoprost 0.005 % ophthalmic solution    XALATAN     Place 1 drop into both eyes At Bedtime        LORazepam 2 MG tablet    ATIVAN          MAPAP 500 MG tablet   Generic drug:  acetaminophen           METHYLCELLULOSE (LAXATIVE) PO           OLANZapine 5 MG tablet    zyPREXA     TAKE ONE TABLET BY MOUTH AT 8PM        omeprazole 20 MG CR capsule    priLOSEC          order for DME      For home use. roling shower chair        potassium chloride 10 MEQ tablet    K-TAB,KLOR-CON          PSYLLIUM PO      MIX AND DRINK 1 TABLESPOONFUL IN 8 OUNCES OF WATER DAILY ORANGE        SENNA-TIME 8.6 MG tablet   Generic drug:  senna           tamsulosin 0.4 MG capsule    FLOMAX          timolol 0.5 % ophthalmic gel-form    TIMOPTIC-XE     INSTILL 1 DROP IN BOTH EYES EVERY MORNING

## 2018-03-22 NOTE — PROGRESS NOTES
HPI Comments: Nursing Notes:   Irving Keller, LPN  3/22/2018  2:52 PM  Unsigned  Patient presents to clinic with staff Juliane with complaint of cough and   runny nose. These symptoms have been ongoing for a couple of days. Staff   states patient has history of Pneumonia.  Irving Keller SAKSHI...... 2:51 PM 3/22/2018    Cold for several days with cough, congestion, slight fever and runny nose. Unable to verbalize ear or throat pain. Treating with cough syrup. Has a history of pneumonia. Sometimes appetite is good, sometimes it is poor.     URI   Associated symptoms include congestion and coughing.         Review of Systems   HENT: Positive for congestion.         Runny nose   Respiratory: Positive for cough.          Physical Exam   Constitutional: No distress.   HENT:   Mouth/Throat: Oropharynx is clear and moist.   Cardiovascular: Normal heart sounds.    Pulmonary/Chest: Breath sounds normal.   Lymphadenopathy:     He has no cervical adenopathy.   Neurological: He is alert.   Skin: Skin is warm and dry.   Psychiatric:   Flat affect     Assessment: on exam, well appearing male with low grade fever, lungs clear to auscultation, TMs without erythema, tonsils without erythema    Results for orders placed or performed in visit on 03/22/18   XR Chest 1 View    Narrative    PROCEDURE:  XR CHEST 1 VW    HISTORY:  ; Cough.     COMPARISON:  9/29/2015, 3/5/2015    FINDINGS:   The cardiac silhouette is normal in size. The pulmonary vasculature is  normal.  There is stable scarring at the right lung base. Pleural  thickening is present in the lung apices. No pleural effusion or  pneumothorax.      Impression    IMPRESSION:  Stable chest x-ray.      LUCILA CRUZ MD   Diagnosis: Viral URI Cough    Plan: Treat with Mucinex as directed  Follow up as needed

## 2018-03-22 NOTE — NURSING NOTE
Patient presents to clinic with staff Juliane with complaint of cough and runny nose. These symptoms have been ongoing for a couple of days. Staff states patient has history of Pneumonia.  Irving Keller LPN...... 2:51 PM 3/22/2018

## 2018-03-28 ENCOUNTER — OFFICE VISIT (OUTPATIENT)
Dept: FAMILY MEDICINE | Facility: OTHER | Age: 73
End: 2018-03-28
Attending: NURSE PRACTITIONER
Payer: COMMERCIAL

## 2018-03-28 ENCOUNTER — APPOINTMENT (OUTPATIENT)
Dept: GENERAL RADIOLOGY | Facility: OTHER | Age: 73
End: 2018-03-28
Attending: FAMILY MEDICINE
Payer: COMMERCIAL

## 2018-03-28 ENCOUNTER — HOSPITAL ENCOUNTER (EMERGENCY)
Facility: OTHER | Age: 73
Discharge: HOME | End: 2018-03-28
Attending: FAMILY MEDICINE | Admitting: FAMILY MEDICINE
Payer: COMMERCIAL

## 2018-03-28 VITALS
DIASTOLIC BLOOD PRESSURE: 70 MMHG | HEIGHT: 72 IN | BODY MASS INDEX: 23.03 KG/M2 | TEMPERATURE: 99.9 F | OXYGEN SATURATION: 93 % | HEART RATE: 104 BPM | SYSTOLIC BLOOD PRESSURE: 114 MMHG | WEIGHT: 170 LBS | RESPIRATION RATE: 18 BRPM

## 2018-03-28 VITALS
HEART RATE: 100 BPM | SYSTOLIC BLOOD PRESSURE: 128 MMHG | DIASTOLIC BLOOD PRESSURE: 80 MMHG | TEMPERATURE: 100.4 F | RESPIRATION RATE: 20 BRPM

## 2018-03-28 DIAGNOSIS — J10.1 INFLUENZA B: ICD-10-CM

## 2018-03-28 DIAGNOSIS — Q87.40 MARFAN'S SYNDROME: ICD-10-CM

## 2018-03-28 DIAGNOSIS — Z53.9 ERRONEOUS ENCOUNTER--DISREGARD: Primary | ICD-10-CM

## 2018-03-28 DIAGNOSIS — F79 MENTAL RETARDATION: ICD-10-CM

## 2018-03-28 LAB
AMYLASE SERPL-CCNC: 31 U/L (ref 29–103)
BASOPHILS # BLD AUTO: 0 10E9/L (ref 0–0.2)
BASOPHILS NFR BLD AUTO: 0.1 %
DIFFERENTIAL METHOD BLD: ABNORMAL
EOSINOPHIL # BLD AUTO: 0 10E9/L (ref 0–0.7)
EOSINOPHIL NFR BLD AUTO: 0 %
ERYTHROCYTE [DISTWIDTH] IN BLOOD BY AUTOMATED COUNT: 13.6 % (ref 10–15)
FLUAV+FLUBV RNA SPEC QL NAA+PROBE: NEGATIVE
FLUAV+FLUBV RNA SPEC QL NAA+PROBE: POSITIVE
HCT VFR BLD AUTO: 42.4 % (ref 40–53)
HGB BLD-MCNC: 14.9 G/DL (ref 13.3–17.7)
IMM GRANULOCYTES # BLD: 0.1 10E9/L (ref 0–0.4)
IMM GRANULOCYTES NFR BLD: 0.3 %
LACTATE SERPL-SCNC: 1.9 MMOL/L (ref 0.5–2.2)
LIPASE SERPL-CCNC: 16 U/L (ref 11–82)
LYMPHOCYTES # BLD AUTO: 0.8 10E9/L (ref 0.8–5.3)
LYMPHOCYTES NFR BLD AUTO: 5.3 %
MCH RBC QN AUTO: 30.3 PG (ref 26.5–33)
MCHC RBC AUTO-ENTMCNC: 35.1 G/DL (ref 31.5–36.5)
MCV RBC AUTO: 86 FL (ref 78–100)
MONOCYTES # BLD AUTO: 1 10E9/L (ref 0–1.3)
MONOCYTES NFR BLD AUTO: 6.3 %
NEUTROPHILS # BLD AUTO: 13.9 10E9/L (ref 1.6–8.3)
NEUTROPHILS NFR BLD AUTO: 88 %
PLATELET # BLD AUTO: 213 10E9/L (ref 150–450)
RBC # BLD AUTO: 4.92 10E12/L (ref 4.4–5.9)
RSV RNA SPEC NAA+PROBE: NEGATIVE
SPECIMEN SOURCE: ABNORMAL
WBC # BLD AUTO: 15.8 10E9/L (ref 4–11)

## 2018-03-28 PROCEDURE — 82150 ASSAY OF AMYLASE: CPT | Performed by: FAMILY MEDICINE

## 2018-03-28 PROCEDURE — 85025 COMPLETE CBC W/AUTO DIFF WBC: CPT | Performed by: FAMILY MEDICINE

## 2018-03-28 PROCEDURE — 99284 EMERGENCY DEPT VISIT MOD MDM: CPT | Mod: 25 | Performed by: FAMILY MEDICINE

## 2018-03-28 PROCEDURE — 25000132 ZZH RX MED GY IP 250 OP 250 PS 637: Performed by: FAMILY MEDICINE

## 2018-03-28 PROCEDURE — G0463 HOSPITAL OUTPT CLINIC VISIT: HCPCS

## 2018-03-28 PROCEDURE — 99284 EMERGENCY DEPT VISIT MOD MDM: CPT | Mod: Z6 | Performed by: FAMILY MEDICINE

## 2018-03-28 PROCEDURE — 83605 ASSAY OF LACTIC ACID: CPT | Performed by: FAMILY MEDICINE

## 2018-03-28 PROCEDURE — 83690 ASSAY OF LIPASE: CPT | Performed by: FAMILY MEDICINE

## 2018-03-28 PROCEDURE — 87631 RESP VIRUS 3-5 TARGETS: CPT | Performed by: FAMILY MEDICINE

## 2018-03-28 PROCEDURE — 36415 COLL VENOUS BLD VENIPUNCTURE: CPT | Performed by: FAMILY MEDICINE

## 2018-03-28 PROCEDURE — 87040 BLOOD CULTURE FOR BACTERIA: CPT | Performed by: FAMILY MEDICINE

## 2018-03-28 PROCEDURE — 99207 ZZC NO CHARGE LOS: CPT | Performed by: NURSE PRACTITIONER

## 2018-03-28 PROCEDURE — 71045 X-RAY EXAM CHEST 1 VIEW: CPT

## 2018-03-28 RX ORDER — OSELTAMIVIR PHOSPHATE 75 MG/1
75 CAPSULE ORAL ONCE
Status: COMPLETED | OUTPATIENT
Start: 2018-03-28 | End: 2018-03-28

## 2018-03-28 RX ORDER — ACETAMINOPHEN 500 MG
TABLET ORAL
COMMUNITY
Start: 2018-01-25 | End: 2018-11-09

## 2018-03-28 RX ORDER — AZITHROMYCIN 250 MG/1
250 TABLET, FILM COATED ORAL DAILY
Qty: 4 TABLET | Refills: 0 | Status: SHIPPED | OUTPATIENT
Start: 2018-03-29 | End: 2018-11-09

## 2018-03-28 RX ORDER — AZITHROMYCIN 250 MG/1
500 TABLET, FILM COATED ORAL ONCE
Status: COMPLETED | OUTPATIENT
Start: 2018-03-28 | End: 2018-03-28

## 2018-03-28 RX ORDER — OSELTAMIVIR PHOSPHATE 75 MG/1
75 CAPSULE ORAL 2 TIMES DAILY
Qty: 9 CAPSULE | Refills: 0 | Status: SHIPPED | OUTPATIENT
Start: 2018-03-29 | End: 2018-04-03

## 2018-03-28 RX ADMIN — AZITHROMYCIN 500 MG: 250 TABLET, FILM COATED ORAL at 19:59

## 2018-03-28 RX ADMIN — OSELTAMIVIR PHOSPHATE 75 MG: 75 CAPSULE ORAL at 19:59

## 2018-03-28 NOTE — ED PROVIDER NOTES
History   No chief complaint on file.    HPI  Dhiraj De La Cruz is a 73 year old male with MR, Marfan's syndrome and pancreatitis who presents to the ER  With a cough and increasing fever. He was seen in [facility] on Thursday ( 6 days ago) and had CXR which was read as clear. He did not have a fever. He has had worsening cough and increasing fever since then. Patient lives in a group home and roommate has been sick with similar symptoms. Had one episode of diarrhea this am.     Problem List:    Patient Active Problem List    Diagnosis Date Noted     Acute pancreatitis 02/12/2018     Priority: Medium     Overview:   recurrent       Aortic aneurysm (H) 02/12/2018     Priority: Medium     Overview:   history of       Edema 02/12/2018     Priority: Medium     Gastritis 02/12/2018     Priority: Medium     Overview:   nonsteriodal induced gastritis with GI bleed       Glaucoma 02/12/2018     Priority: Medium     Inguinal hernia, left 02/12/2018     Priority: Medium     Marfan's syndrome 02/12/2018     Priority: Medium     Mental retardation 02/12/2018     Priority: Medium     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     Priority: Medium     Venous (peripheral) insufficiency 02/12/2018     Priority: Medium     Overview:   legs       Urinary incontinence 02/12/2018     Priority: Medium     ACP (advance care planning) 12/12/2013     Priority: Medium     Gout 12/09/2013     Priority: Medium     Abnormal glucose 08/10/2010     Priority: Medium     Constipation 07/22/2010     Priority: Medium        Past Medical History:    Past Medical History:   Diagnosis Date     Acute pancreatitis without infection or necrosis      Gastritis without bleeding      Gout      Intellectual disability      Marfan's syndrome      Other specified disorders of veins      Personal history of other diseases of the circulatory system (CODE)      Situs inversus      Urinary incontinence        Past Surgical History:    Past Surgical History:   Procedure Laterality  Date     OTHER SURGICAL HISTORY      02/25/2010,,OPEN CHOLECYSTECTOMY       Family History:    No family history on file.    Social History:  Marital Status:  Single [1]  Social History   Substance Use Topics     Smoking status: Former Smoker     Quit date: 1/28/2004     Smokeless tobacco: Never Used     Alcohol use No        Medications:      acetaminophen (MAPAP) 500 MG tablet   Lubiprostone (AMITIZA PO)   [START ON 3/29/2018] azithromycin (ZITHROMAX) 250 MG tablet   [START ON 3/29/2018] oseltamivir (TAMIFLU) 75 MG capsule   allopurinol (ZYLOPRIM) 300 MG tablet   busPIRone (BUSPAR) 5 MG tablet   docusate sodium (DOCQLACE) 100 MG capsule   furosemide (LASIX) 40 MG tablet   acetaminophen (MAPAP) 500 MG tablet   OLANZapine (ZYPREXA) 5 MG tablet   omeprazole (PRILOSEC) 20 MG CR capsule   potassium chloride (K-TAB,KLOR-CON) 10 MEQ tablet   senna (SENNA-TIME) 8.6 MG tablet   tamsulosin (FLOMAX) 0.4 MG capsule   Influenza Vac Subunit Quad (FLUCELVAX QUADRIVALENT) 0.5 ML DONTAE   haloperidol (HALDOL) 5 MG tablet   latanoprost (XALATAN) 0.005 % ophthalmic solution   LORazepam (ATIVAN) 2 MG tablet   METHYLCELLULOSE, LAXATIVE, PO   PSYLLIUM PO   order for DME   timolol (TIMOPTIC-XE) 0.5 % ophthalmic gel-form         Review of Systems   Unable to perform ROS: Patient nonverbal       Physical Exam   BP: 137/76  Pulse: 104  Temp: 101.1  F (38.4  C)  Resp: 18  Height: 182.9 cm (6')  Weight: 77.1 kg (170 lb)  SpO2: 93 %      Physical Exam   Constitutional: He is oriented to person, place, and time. He appears well-developed and well-nourished. No distress.   HENT:   Head: Normocephalic and atraumatic.   Right Ear: External ear normal.   Left Ear: External ear normal.   Nose: Nose normal.   Mouth/Throat: Oropharynx is clear and moist.   Eyes: EOM are normal. Pupils are equal, round, and reactive to light.   Neck: Normal range of motion. Neck supple. No thyromegaly present.   Cardiovascular: Normal rate, regular rhythm, normal  heart sounds and intact distal pulses.    Pulmonary/Chest: Effort normal and breath sounds normal. No respiratory distress. He has no wheezes. He has no rales.   Some crackles hear in the right upper lung.    Abdominal: Soft. Bowel sounds are normal. He exhibits no distension. There is no tenderness. There is no rebound.   Musculoskeletal: He exhibits edema.   Lymphadenopathy:     He has no cervical adenopathy.   Neurological: He is alert and oriented to person, place, and time. No cranial nerve deficit. Coordination normal.   Skin: Skin is warm and dry. He is not diaphoretic.   Nursing note and vitals reviewed.      ED Course     ED Course   Patient seen and examined. Labs and CXR done.   Procedures              Results for orders placed or performed during the hospital encounter of 03/28/18 (from the past 24 hour(s))   CBC with platelets differential   Result Value Ref Range    WBC 15.8 (H) 4.0 - 11.0 10e9/L    RBC Count 4.92 4.4 - 5.9 10e12/L    Hemoglobin 14.9 13.3 - 17.7 g/dL    Hematocrit 42.4 40.0 - 53.0 %    MCV 86 78 - 100 fl    MCH 30.3 26.5 - 33.0 pg    MCHC 35.1 31.5 - 36.5 g/dL    RDW 13.6 10.0 - 15.0 %    Platelet Count 213 150 - 450 10e9/L    Diff Method Automated Method     % Neutrophils 88.0 %    % Lymphocytes 5.3 %    % Monocytes 6.3 %    % Eosinophils 0.0 %    % Basophils 0.1 %    % Immature Granulocytes 0.3 %    Absolute Neutrophil 13.9 (H) 1.6 - 8.3 10e9/L    Absolute Lymphocytes 0.8 0.8 - 5.3 10e9/L    Absolute Monocytes 1.0 0.0 - 1.3 10e9/L    Absolute Eosinophils 0.0 0.0 - 0.7 10e9/L    Absolute Basophils 0.0 0.0 - 0.2 10e9/L    Abs Immature Granulocytes 0.1 0 - 0.4 10e9/L   Lactic acid   Result Value Ref Range    Lactic Acid 1.9 0.5 - 2.2 mmol/L   Lipase   Result Value Ref Range    Lipase 16 11 - 82 U/L   Amylase   Result Value Ref Range    Amylase 31 29 - 103 U/L   XR Chest Port 1 View    Narrative    XR CHEST PORT 1 VW    HISTORY: 73 yearsMale MELONIE ortiz't get out of chair. worsening cough  over  the last 6 days.;     TECHNIQUE: A single view of the chest was performed    COMPARISON: 3/22/2018    FINDINGS: Heart size and pulmonary vascularity are within normal  limits. There is kyphosis rotoscoliosis unchanged from the prior  study. There is mild atelectasis or scarring at the right lung base.  There is apical pleural thickening or scarring bilaterally without  change.      Impression    IMPRESSION: Mild atelectasis or scarring at the right lung base. Lungs  are otherwise clear without change from the prior study.    FLORENTIN LORENZO MD   Influenza A and B and RSV PCR   Result Value Ref Range    Specimen Description Nasopharyngeal     Influenza A PCR Negative NEG^Negative    Influenza B PCR Positive (A) NEG^Negative    Resp Syncytial Virus Negative NEG^Negative       Medications   oseltamivir (TAMIFLU) capsule 75 mg (not administered)   azithromycin (ZITHROMAX) tablet 500 mg (not administered)       Assessments & Plan (with Medical Decision Making)     I have reviewed the nursing notes.    I have reviewed the findings, diagnosis, plan and need for follow up with the patient.  Patient signed out at end of shift to Dr Levy.     7:58 PM patient's influenza swab is returned positive with influenza type B.  His caregiver reports he frequently gets pneumonias after viral illnesses.  His white count is elevated with left shift and despite benign chest x-ray will cover also with Zithromax.  Initial doses were given here in the emergency department and prescriptions are sent to [facility] for the remainder of his course of Tamiflu and Zithromax.    New Prescriptions    AZITHROMYCIN (ZITHROMAX) 250 MG TABLET    Take 1 tablet (250 mg) by mouth daily    OSELTAMIVIR (TAMIFLU) 75 MG CAPSULE    Take 1 capsule (75 mg) by mouth 2 times daily for 9 doses       Final diagnoses:   Influenza B - can't rule out early secondary bacterial infection.   Marfan's syndrome   Mental retardation       3/28/2018   [facility]  [facility] Center[facility] MD  03/28/18 6003       Vinod Levy MD  03/28/18 1959

## 2018-03-28 NOTE — PROGRESS NOTES
Nursing Notes:   Gladys Chen CMA  3/28/2018  3:56 PM  Unsigned  Patient present to clinic today with cough and chest congestion. Still running fevers and now has stopped eating for the most part and will not drink x 2 day. Urine output is ok still but dark.  Gladys Chen [facility]..............3/28/2018........3:55 PM    Because of Nurse's note he is taken to ER.     Danni Cline NP on 3/28/2018 at 4:13 PM      This encounter was opened in error. Please disregard.

## 2018-03-28 NOTE — ED AVS SNAPSHOT
[facility]    [address]    [place] [address]    Phone:  [phone]    Fax:  [phone]                                       Dhiraj De La Cruz   MRN: [id number]    Department:  [facility]   Date of Visit:  3/28/2018           After Visit Summary Signature Page     I have received my discharge instructions, and my questions have been answered. I have discussed any challenges I see with this plan with the nurse or doctor.    ..........................................................................................................................................  Patient/Patient Representative Signature      ..........................................................................................................................................  Patient Representative Print Name and Relationship to Patient    ..................................................               ................................................  Date                                            Time    ..........................................................................................................................................  Reviewed by Signature/Title    ...................................................              ..............................................  Date                                                            Time

## 2018-03-28 NOTE — ED AVS SNAPSHOT
[facility]    [address]    [place] [address]    Phone:  [phone]    Fax:  [phone]                                       Dhiraj De La Cruz   MRN: [id number]    Department:  [facility]   Date of Visit:  3/28/2018           Patient Information     Date Of Birth          1945        Your diagnoses for this visit were:     Influenza B can't rule out early secondary bacterial infection.    Marfan's syndrome     Mental retardation        You were seen by Shaina Alcantar MD and Vinod Levy MD.      Follow-up Information     Schedule an appointment as soon as possible for a visit with Armaan Gallego MD.    Specialty:  Internal Medicine    Why:  As needed, If symptoms worsen    Contact information:    [address]  [place] [address]  [phone]          Discharge Instructions       Dhiraj,  It was nice to meet you.  I am sorry you are sick with influenza type B.  With your history of pneumonia and elevated white count we will also cover you with Zithromax.      I would like you to drink at least 700 mL's 3 times a day to stay well-hydrated.    He will be quite sick for the next 5-7 days and then will slowly improve.  Follow-up as needed for worsening breathing problems.    Hope you're back to normal soon.    Sincerely,  Dr. Ramón Levy        Influenza (Adult)    Influenza is also called the flu. It is a viral illness that affects the air passages of your lungs. It is different from the common cold. The flu can easily be passed from one to person to another. It may be spread through the air by coughing and sneezing. Or it can be spread by touching the sick person and then touching your own eyes, nose, or mouth.  The flu starts 1 to 3 days after you are exposed to the flu virus. It may last for 1 to 2 weeks but many people feel tired or fatigued for many weeks afterward. You usually don t need to take antibiotics unless you have a  complication. This might be an ear or sinus infection or pneumonia.  Symptoms of the flu may be mild or severe. They can include extreme tiredness (wanting to stay in bed all day), chills, fevers, muscle aches, soreness with eye movement, headache, and a dry, hacking cough.  Home care  Follow these guidelines when caring for yourself at home:    Avoid being around cigarette smoke, whether yours or other people s.    Acetaminophen or ibuprofen will help ease your fever, muscle aches, and headache. Don t give aspirin to anyone younger than 18 who has the flu. Aspirin can harm the liver.    Nausea and loss of appetite are common with the flu. Eat light meals. Drink 6 to 8 glasses of liquids every day. Good choices are water, sport drinks, soft drinks without caffeine, juices, tea, and soup. Extra fluids will also help loosen secretions in your nose and lungs.    Over-the-counter cold medicines will not make the flu go away faster. But the medicines may help with coughing, sore throat, and congestion in your nose and sinuses. Don t use a decongestant if you have high blood pressure.    Stay home until your fever has been gone for at least 24 hours without using medicine to reduce fever.  Follow-up care  Follow up with your healthcare provider, or as advised, if you are not getting better over the next week.  If you are age 65 or older, talk with your provider about getting a pneumococcal vaccine every 5 years. You should also get this vaccine if you have chronic asthma or COPD. All adults should get a flu vaccine every fall. Ask your provider about this.  When to seek medical advice  Call your healthcare provider right away if any of these occur:    Cough with lots of colored mucus (sputum) or blood in your mucus    Chest pain, shortness of breath, wheezing, or trouble breathing    Severe headache, or face, neck, or ear pain    New rash with fever    Fever of 100.4 F (38 C) or higher, or as directed by your healthcare  provider    Confusion, behavior change, or seizure    Severe weakness or dizziness    You get a new fever or cough after getting better for a few days  Date Last Reviewed: 1/1/2017 2000-2017 [employer]. [address] [place] [address], [place] [address]. All rights reserved. This information is not intended as a substitute for professional medical care. Always follow your healthcare professional's instructions.              24 Hour Appointment Hotline       To make an appointment at any [facility], call [phone] ([phone]). If you don't have a family doctor or clinic, we will help you find one. [facility] are conveniently located to serve the needs of you and your family.             Review of your medicines      START taking        Dose / Directions Last dose taken    azithromycin 250 MG tablet   Commonly known as:  ZITHROMAX   Dose:  250 mg   Quantity:  4 tablet   Start taking on:  3/29/2018        Take 1 tablet (250 mg) by mouth daily   Refills:  0        oseltamivir 75 MG capsule   Commonly known as:  TAMIFLU   Dose:  75 mg   Quantity:  9 capsule   Start taking on:  3/29/2018        Take 1 capsule (75 mg) by mouth 2 times daily for 9 doses   Refills:  0          Our records show that you are taking the medicines listed below. If these are incorrect, please call your family doctor or clinic.        Dose / Directions Last dose taken    allopurinol 300 MG tablet   Commonly known as:  ZYLOPRIM        TAKE 1 TABLET BY MOUTH DAILY (AM)   Refills:  0        AMITIZA PO   Dose:  8 mcg        Take 8 mcg by mouth   Refills:  0        busPIRone 5 MG tablet   Commonly known as:  BUSPAR        TAKE 1 TABLET BY MOUTH THREE TIMES DAILY (7AM,3PM,8PM)   Refills:  0        DOCQLACE 100 MG capsule   Generic drug:  docusate sodium        Refills:  0        FLUCELVAX QUADRIVALENT 0.5 ML Oralia   Generic drug:  Influenza Vac Subunit Quad        UTD   Refills:  0        furosemide 40 MG tablet   Commonly  known as:  LASIX   Dose:  2 tablet        Take 2 tablets by mouth daily At 7 AM   Refills:  0        haloperidol 5 MG tablet   Commonly known as:  HALDOL        Refills:  0        latanoprost 0.005 % ophthalmic solution   Commonly known as:  XALATAN   Dose:  1 drop        Place 1 drop into both eyes At Bedtime   Refills:  0        LORazepam 2 MG tablet   Commonly known as:  ATIVAN        Refills:  0        * MAPAP 500 MG tablet   Generic drug:  acetaminophen        Refills:  0        * MAPAP 500 MG tablet   Generic drug:  acetaminophen        Refills:  0        METHYLCELLULOSE (LAXATIVE) PO        Refills:  0        OLANZapine 5 MG tablet   Commonly known as:  zyPREXA        TAKE ONE TABLET BY MOUTH AT 8PM   Refills:  0        omeprazole 20 MG CR capsule   Commonly known as:  priLOSEC        Refills:  0        order for DME        For home use. roling shower chair   Refills:  0        potassium chloride 10 MEQ tablet   Commonly known as:  K-TAB,KLOR-CON        Refills:  0        PSYLLIUM PO        MIX AND DRINK 1 TABLESPOONFUL IN 8 OUNCES OF WATER DAILY ORANGE   Refills:  0        SENNA-TIME 8.6 MG tablet   Generic drug:  senna        Refills:  0        tamsulosin 0.4 MG capsule   Commonly known as:  FLOMAX        Refills:  0        timolol 0.5 % ophthalmic gel-form   Commonly known as:  TIMOPTIC-XE        INSTILL 1 DROP IN BOTH EYES EVERY MORNING   Refills:  0        * Notice:  This list has 2 medication(s) that are the same as other medications prescribed for you. Read the directions carefully, and ask your doctor or other care provider to review them with you.            Prescriptions were sent or printed at these locations (2 Prescriptions)                   [facility] - [place], [place] - [address] [facility]   [address] [facility] [address]    Telephone:  [phone]   Fax:  [phone]   Hours:                  E-Prescribed (2 of 2)         azithromycin (ZITHROMAX) 250 MG  "tablet               oseltamivir (TAMIFLU) 75 MG capsule                Procedures and tests performed during your visit     Amylase    Blood culture ONE site    CBC with platelets differential    Influenza A and B and RSV PCR    Lactic acid    Lipase    XR Chest Port 1 View      Orders Needing Specimen Collection     Ordered          03/28/18 1748  *UA reflex to Microscopic - STAT, Prio: STAT, Needs to be Collected     Scheduled Task Status   03/28/18 1748 Collect *UA reflex to Microscopic Open   Order Class:  PCU Collect                  Pending Results     Date and Time Order Name Status Description    3/28/2018 1748 Blood culture ONE site In process             Pending Culture Results     Date and Time Order Name Status Description    3/28/2018 1748 Blood culture ONE site In process             Pending Results Instructions     If you had any lab results that were not finalized at the time of your Discharge, you can call the ED Lab Result RN at [phone]. You will be contacted by this team for any positive Lab results or changes in treatment. The nurses are available 7 days a week from 10A to 6:30P.  You can leave a message 24 hours per day and they will return your call.        Thank you for choosing [facility]       Thank you for choosing [facility] for your care. Our goal is always to provide you with excellent care. Hearing back from our patients is one way we can continue to improve our services. Please take a few minutes to complete the written survey that you may receive in the mail after you visit with us. Thank you!        [employer] Information     [employer] lets you send messages to your doctor, view your test results, renew your prescriptions, schedule appointments and more. To sign up, go to [email or web address] . Click on \"Log in\" on the left side of the screen, which will take you to the Welcome page. Then click on \"Sign up Now\" on the right side of the page.     You will be asked to enter the access " code listed below, as well as some personal information. Please follow the directions to create your username and password.     Your access code is: [email or web address]  Expires: 2018  4:00 PM     Your access code will  in 90 days. If you need help or a new code, please call your [facility] or [phone].        Care EveryWhere ID     This is your Care EveryWhere ID. This could be used by other organizations to access your [facility] medical records  [id number]        Equal Access to Services     [facility] CenterFELICITAS : Marga Pool, daina carter, hugo arvizualkayli kumar, josette carreno . So [facility] [phone].    ATENCIÓN: Si habla español, tiene a olvera disposición servicios gratuitos de asistencia lingüística. Llame al [phone].    We comply with applicable federal civil rights laws and [place] laws. We do not discriminate on the basis of race, color, national origin, age, disability, sex, sexual orientation, or gender identity.            After Visit Summary       This is your record. Keep this with you and show to your community pharmacist(s) and doctor(s) at your next visit.

## 2018-03-28 NOTE — NURSING NOTE
Patient present to clinic today with cough and chest congestion. Still running fevers and now has stopped eating for the most part and will not drink x 2 day. Urine output is ok still but dark.  Gladys Chen CMA..............3/28/2018........3:55 PM

## 2018-03-28 NOTE — MR AVS SNAPSHOT
"              After Visit Summary   3/28/2018    Dhiraj De La Cruz    MRN: [id number]           Patient Information     Date Of Birth          1945        Visit Information        Provider Department      3/28/2018 3:45 PM Danni Cline NP [facility]        Today's Diagnoses     ERRONEOUS ENCOUNTER--DISREGARD    -  1       Follow-ups after your visit        Who to contact     If you have questions or need follow up information about today's clinic visit or your schedule please contact [facility] directly at [phone].  Normal or non-critical lab and imaging results will be communicated to you by [employer], letter or phone within 4 business days after the clinic has received the results. If you do not hear from us within 7 days, please contact the clinic through [employer] or phone. If you have a critical or abnormal lab result, we will notify you by phone as soon as possible.  Submit refill requests through [employer] or call your pharmacy and they will forward the refill request to us. Please allow 3 business days for your refill to be completed.          Additional Information About Your Visit        MyChart Information     [employer] lets you send messages to your doctor, view your test results, renew your prescriptions, schedule appointments and more. To sign up, go to [email or web address] . Click on \"Log in\" on the left side of the screen, which will take you to the Welcome page. Then click on \"Sign up Now\" on the right side of the page.     You will be asked to enter the access code listed below, as well as some personal information. Please follow the directions to create your username and password.     Your access code is: [email or web address]  Expires: 2018  4:00 PM     Your access code will  in 90 days. If you need help or a new code, please call your [place] clinic or [phone].        Care EveryWhere ID     This is your Care EveryWhere ID. This could be " used by other organizations to access your [place] medical records  [id number]        Your Vitals Were     Pulse Temperature Respirations             100 100.4  F (38  C) (Tympanic) 20          Blood Pressure from Last 3 Encounters:   03/28/18 128/80   03/22/18 118/88   12/19/17 122/78    Weight from Last 3 Encounters:   02/09/16 165 lb (74.8 kg)   01/25/16 165 lb (74.8 kg)   09/29/15 165 lb (74.8 kg)              Today, you had the following     No orders found for display       Primary Care Provider Office Phone # Fax #    Armaan Gallego [phone] [address] [facility] [address]        Equal Access to Services     SIMONA CHRISTIANSON : Marga jono Sodiaz, waaxda luqadaha, qaybta kaalmada adeegyarishabh, josette carreno . [facility] [phone].    ATENCIÓN: Si habla español, tiene a olvera disposición servicios gratuitos de asistencia lingüística. [facility] [phone].    We comply with applicable federal civil rights laws and [place] laws. We do not discriminate on the basis of race, color, national origin, age, disability, sex, sexual orientation, or gender identity.            Thank you!     Thank you for choosing [facility] AND [facility]  for your care. Our goal is always to provide you with excellent care. Hearing back from our patients is one way we can continue to improve our services. Please take a few minutes to complete the written survey that you may receive in the mail after your visit with us. Thank you!             Your Updated Medication List - Protect others around you: Learn how to safely use, store and throw away your medicines at www.disposemymeds.org.          This list is accurate as of 3/28/18  4:14 PM.  Always use your most recent med list.                   Brand Name Dispense Instructions for use Diagnosis    allopurinol 300 MG tablet    ZYLOPRIM     TAKE 1 TABLET BY MOUTH DAILY (AM)        busPIRone 5 MG tablet     BUSPAR     TAKE 1 TABLET BY MOUTH THREE TIMES DAILY (7AM,3PM,8PM)        DOCQLACE 100 MG capsule   Generic drug:  docusate sodium           FLUCELVAX QUADRIVALENT 0.5 ML Oralia   Generic drug:  Influenza Vac Subunit Quad      UTD        furosemide 40 MG tablet    LASIX     Take 2 tablets by mouth daily At 7 AM        haloperidol 5 MG tablet    HALDOL          latanoprost 0.005 % ophthalmic solution    XALATAN     Place 1 drop into both eyes At Bedtime        LORazepam 2 MG tablet    ATIVAN          * MAPAP 500 MG tablet   Generic drug:  acetaminophen           * MAPAP 500 MG tablet   Generic drug:  acetaminophen           METHYLCELLULOSE (LAXATIVE) PO           OLANZapine 5 MG tablet    zyPREXA     TAKE ONE TABLET BY MOUTH AT 8PM        omeprazole 20 MG CR capsule    priLOSEC          order for DME      For home use. roling shower chair        potassium chloride 10 MEQ tablet    K-TAB,KLOR-CON          PSYLLIUM PO      MIX AND DRINK 1 TABLESPOONFUL IN 8 OUNCES OF WATER DAILY ORANGE        SENNA-TIME 8.6 MG tablet   Generic drug:  senna           tamsulosin 0.4 MG capsule    FLOMAX          timolol 0.5 % ophthalmic gel-form    TIMOPTIC-XE     INSTILL 1 DROP IN BOTH EYES EVERY MORNING        * Notice:  This list has 2 medication(s) that are the same as other medications prescribed for you. Read the directions carefully, and ask your doctor or other care provider to review them with you.

## 2018-03-28 NOTE — ED NOTES
Pt has had a cough for one week, was seen in rapid clinic on Thursday. Pt is at high risk for pneumonia. Pt continues to cough and now c/o fever. Pt also has had decreased intake. Pt comes from [place]. Pt's caregiver present      Patricia Coto RN on 3/28/2018 at 4:25 PM

## 2018-03-29 NOTE — DISCHARGE INSTRUCTIONS
Dhiraj,  It was nice to meet you.  I am sorry you are sick with influenza type B.  With your history of pneumonia and elevated white count we will also cover you with Zithromax.      I would like you to drink at least 700 mL's 3 times a day to stay well-hydrated.    He will be quite sick for the next 5-7 days and then will slowly improve.  Follow-up as needed for worsening breathing problems.    Hope you're back to normal soon.    Sincerely,  Dr. Ramón Levy        Influenza (Adult)    Influenza is also called the flu. It is a viral illness that affects the air passages of your lungs. It is different from the common cold. The flu can easily be passed from one to person to another. It may be spread through the air by coughing and sneezing. Or it can be spread by touching the sick person and then touching your own eyes, nose, or mouth.  The flu starts 1 to 3 days after you are exposed to the flu virus. It may last for 1 to 2 weeks but many people feel tired or fatigued for many weeks afterward. You usually don t need to take antibiotics unless you have a complication. This might be an ear or sinus infection or pneumonia.  Symptoms of the flu may be mild or severe. They can include extreme tiredness (wanting to stay in bed all day), chills, fevers, muscle aches, soreness with eye movement, headache, and a dry, hacking cough.  Home care  Follow these guidelines when caring for yourself at home:    Avoid being around cigarette smoke, whether yours or other people s.    Acetaminophen or ibuprofen will help ease your fever, muscle aches, and headache. Don t give aspirin to anyone younger than 18 who has the flu. Aspirin can harm the liver.    Nausea and loss of appetite are common with the flu. Eat light meals. Drink 6 to 8 glasses of liquids every day. Good choices are water, sport drinks, soft drinks without caffeine, juices, tea, and soup. Extra fluids will also help loosen secretions in your nose and  lungs.    Over-the-counter cold medicines will not make the flu go away faster. But the medicines may help with coughing, sore throat, and congestion in your nose and sinuses. Don t use a decongestant if you have high blood pressure.    Stay home until your fever has been gone for at least 24 hours without using medicine to reduce fever.  Follow-up care  Follow up with your healthcare provider, or as advised, if you are not getting better over the next week.  If you are age 65 or older, talk with your provider about getting a pneumococcal vaccine every 5 years. You should also get this vaccine if you have chronic asthma or COPD. All adults should get a flu vaccine every fall. Ask your provider about this.  When to seek medical advice  Call your healthcare provider right away if any of these occur:    Cough with lots of colored mucus (sputum) or blood in your mucus    Chest pain, shortness of breath, wheezing, or trouble breathing    Severe headache, or face, neck, or ear pain    New rash with fever    Fever of 100.4 F (38 C) or higher, or as directed by your healthcare provider    Confusion, behavior change, or seizure    Severe weakness or dizziness    You get a new fever or cough after getting better for a few days  Date Last Reviewed: 1/1/2017 2000-2017 [employer]. [address] [place] [address], [place] [address]. All rights reserved. This information is not intended as a substitute for professional medical care. Always follow your healthcare professional's instructions.

## 2018-04-03 ENCOUNTER — TELEPHONE (OUTPATIENT)
Dept: INTERNAL MEDICINE | Facility: OTHER | Age: 73
End: 2018-04-03

## 2018-04-03 LAB
BACTERIA SPEC CULT: NORMAL
SPECIMEN SOURCE: NORMAL

## 2018-04-03 NOTE — TELEPHONE ENCOUNTER
Returned call to give information about when patient may be safe to have contact with others after having Dx of influenza B made in ED on 3-28-18. Patient's sister stated he has taken Zithromax and Tamiflu as ordered for 6 days now. No fever present. Does have lingering cough which I confirmed may last for a couple of weeks, but patient should not be contagious at this point. Caller stated understanding and thanks. Pooja Duarte, RN on 4/3/2018 at 10:10 AM

## 2018-04-24 DIAGNOSIS — F79 MENTAL RETARDATION: Primary | ICD-10-CM

## 2018-04-24 RX ORDER — OLANZAPINE 5 MG/1
TABLET ORAL
Qty: 90 TABLET | Refills: 1 | Status: SHIPPED | OUTPATIENT
Start: 2018-04-24 | End: 2018-10-02

## 2018-04-24 NOTE — TELEPHONE ENCOUNTER
Routing refill request to provider for review/approval because:  Labs not current:  Lipids not on file    LOV: 10/9/17    Chrissie Travis RN on 4/24/2018 at 12:14 PM

## 2018-05-21 DIAGNOSIS — Q87.40 MARFAN'S SYNDROME: Primary | ICD-10-CM

## 2018-05-22 RX ORDER — BUSPIRONE HYDROCHLORIDE 5 MG/1
TABLET ORAL
Qty: 90 TABLET | Refills: 3 | Status: SHIPPED | OUTPATIENT
Start: 2018-05-22 | End: 2018-08-07

## 2018-05-22 NOTE — TELEPHONE ENCOUNTER
Routing refill request to provider for review/approval because:  Medication is reported/historical    LOV 10/9/17    Per Care everywhere.  busPIRone (BUSPAR) 5 mg tablet    Indications: Marfan's syndrome TAKE 1 TABLET BY MOUTH THREE TIMES DAILY (7AM,3PM,8PM)     Chrissie Travis RN on 5/22/2018 at 10:50 AM

## 2018-06-14 DIAGNOSIS — M10.9 GOUT, UNSPECIFIED CAUSE, UNSPECIFIED CHRONICITY, UNSPECIFIED SITE: Primary | ICD-10-CM

## 2018-06-18 RX ORDER — ALLOPURINOL 300 MG/1
300 TABLET ORAL DAILY
Qty: 90 TABLET | Refills: 3 | Status: SHIPPED | OUTPATIENT
Start: 2018-06-18 | End: 2019-04-22

## 2018-07-05 DIAGNOSIS — K59.00 CONSTIPATION, UNSPECIFIED CONSTIPATION TYPE: Primary | ICD-10-CM

## 2018-07-09 NOTE — TELEPHONE ENCOUNTER
Last OV 10/9/17 with PCP. Prescription approved per [facility]. Refill authorized for 6 refill at this time.     Britney Lamar RN on 7/9/2018 at 1:55 PM

## 2018-07-20 DIAGNOSIS — K59.00 CONSTIPATION: Primary | ICD-10-CM

## 2018-07-20 RX ORDER — DOCUSATE SODIUM 100 MG/1
100 CAPSULE, LIQUID FILLED ORAL 2 TIMES DAILY
Qty: 100 CAPSULE | Refills: 6 | Status: SHIPPED | OUTPATIENT
Start: 2018-07-20 | End: 2019-07-18

## 2018-07-20 NOTE — TELEPHONE ENCOUNTER
Prescription approved per [facility] Refill Protocol.  Pooja Duarte RN on 7/20/2018 at 2:41 PM

## 2018-07-23 NOTE — PROGRESS NOTES
Patient Information     Patient Name  Dhiraj De La Cruz MRN  [id number] Sex  Male   1945      Letter by Armaan Gallego MD at      Author:  Armaan Gallego MD Service:  (none) Author Type:  (none)    Filed:   Encounter Date:  10/9/2017 Status:  (Other)           Dhiraj De La Cruz  [address] [facility] [address]          2017    Dear Eliezer and caregivers,    Following are the tests completed during your last clinic visit:    Results for orders placed or performed in visit on 10/09/17      COMPLETE METABOLIC PANEL      Result  Value Ref Range    SODIUM 134 133 - 143 mmol/L    POTASSIUM 3.7 3.5 - 5.1 mmol/L    CHLORIDE 103 98 - 107 mmol/L    CO2,TOTAL 27 21 - 31 mmol/L    ANION GAP 4 (L) 5 - 18                    GLUCOSE 130 (H) 70 - 105 mg/dL    CALCIUM 10.0 8.6 - 10.3 mg/dL    BUN 16 7 - 25 mg/dL    CREATININE 0.92 0.70 - 1.30 mg/dL    BUN/CREAT RATIO           17                    GFR if African American >60 >60 ml/min/1.73m2    GFR if not African American >60 >60 ml/min/1.73m2    ALBUMIN 4.6 3.5 - 5.7 g/dL    PROTEIN,TOTAL 7.9 6.4 - 8.9 g/dL    GLOBULIN                  3.3 2.0 - 3.7 g/dL    A/G RATIO 1.4 1.0 - 2.0                    BILIRUBIN,TOTAL 1.3 (H) 0.3 - 1.0 mg/dL    ALK PHOSPHATASE 97 34 - 104 IU/L    ALT (SGPT) 14 7 - 52 IU/L    AST (SGOT) 17 13 - 39 IU/L   HEMOGLOBIN A1C MONITORING (POCT)      Result  Value Ref Range    HEMOGLOBIN A1C MONITORING (POCT) 6.0 4.0 - 6.2 %    ESTIMATED AVERAGE GLUCOSE  126 mg/dL   CBC WITH AUTO DIFFERENTIAL      Result  Value Ref Range    WHITE BLOOD COUNT         6.7 4.5 - 11.0 thou/cu mm    RED BLOOD COUNT           5.03 4.30 - 5.90 mil/cu mm    HEMOGLOBIN                15.5 13.5 - 17.5 g/dL    HEMATOCRIT                45.3 37.0 - 53.0 %    MCV                       90 80 - 100 fL    MCH                       30.8 26.0 - 34.0 pg    MCHC                      34.2 32.0 - 36.0 g/dL    RDW                       13.8 11.5 - 15.5 %    PLATELET COUNT             235 140 - 440 thou/cu mm    MPV                       8.9 6.5 - 11.0 fL    NEUTROPHILS               61.1 42.0 - 72.0 %    LYMPHOCYTES               28.3 20.0 - 44.0 %    MONOCYTES                 9.1 <12.0 %    EOSINOPHILS               1.0 <8.0 %    BASOPHILS                 0.4 <3.0 %    IMMATURE GRANULOCYTES(METAS,MYELOS,PROS) 0.1 %    ABSOLUTE NEUTROPHILS      4.1 1.7 - 7.0 thou/cu mm    ABSOLUTE LYMPHOCYTES      1.9 0.9 - 2.9 thou/cu mm    ABSOLUTE MONOCYTES        0.6 <0.9 thou/cu mm    ABSOLUTE EOSINOPHILS      0.1 <0.5 thou/cu mm    ABSOLUTE BASOPHILS        0.0 <0.3 thou/cu mm    ABSOLUTE IMMATURE GRANULOCYTES(METAS,MYELOS,PROS) 0.0 <=0.3 thou/cu mm         Your blood tests are normal. Congratulations on this excellent report.    Sincerely,      Armaan Gallego MD  Internal Medicine  [facility]

## 2018-08-07 DIAGNOSIS — Q87.40 MARFAN'S SYNDROME: ICD-10-CM

## 2018-08-07 DIAGNOSIS — N39.41 URGE INCONTINENCE OF URINE: Primary | ICD-10-CM

## 2018-08-07 RX ORDER — TAMSULOSIN HYDROCHLORIDE 0.4 MG/1
0.4 CAPSULE ORAL DAILY
Qty: 90 CAPSULE | Refills: 0 | Status: SHIPPED | OUTPATIENT
Start: 2018-08-07 | End: 2018-11-14

## 2018-08-07 RX ORDER — BUSPIRONE HYDROCHLORIDE 5 MG/1
TABLET ORAL
Qty: 90 TABLET | Refills: 0 | Status: SHIPPED | OUTPATIENT
Start: 2018-08-07 | End: 2018-10-22

## 2018-08-07 NOTE — TELEPHONE ENCOUNTER
Prescription approved per [facility] Refill Protocol. Pooja Duarte RN on 8/7/2018 at 11:50 AM

## 2018-09-19 DIAGNOSIS — K59.00 CONSTIPATION, UNSPECIFIED CONSTIPATION TYPE: Primary | ICD-10-CM

## 2018-09-19 DIAGNOSIS — F79 MENTAL RETARDATION: ICD-10-CM

## 2018-09-20 RX ORDER — OLANZAPINE 5 MG/1
TABLET ORAL
Qty: 90 TABLET | OUTPATIENT
Start: 2018-09-20

## 2018-09-20 NOTE — TELEPHONE ENCOUNTER
Routing refill request to provider for review/approval because:  Medication is reported/historical  No instructions noted     Per LOV on 10/9/2017  SENNA 8.6 mg tablet TAKE 1 TABLET BY MOUTH TWICE DAILY (AM,8PM)     Will route to covering team let for review and consideration.    Chrissie Travis RN on 9/20/2018 at 4:24 PM

## 2018-09-21 RX ORDER — SENNOSIDES A AND B 8.6 MG/1
1 TABLET, FILM COATED ORAL 2 TIMES DAILY
Qty: 180 TABLET | Refills: 0 | Status: SHIPPED | OUTPATIENT
Start: 2018-09-21 | End: 2018-11-14

## 2018-10-02 DIAGNOSIS — F79 MENTAL RETARDATION: ICD-10-CM

## 2018-10-02 RX ORDER — OLANZAPINE 5 MG/1
TABLET ORAL
Qty: 90 TABLET | Refills: 3 | Status: SHIPPED | OUTPATIENT
Start: 2018-10-02 | End: 2019-08-19

## 2018-10-02 NOTE — TELEPHONE ENCOUNTER
Prescription approved per [facility] [place].  Has appointment on 10-12-18. Need for lipid lab noted.   Pooja Duarte RN on 10/2/2018 at 8:27 AM

## 2018-10-22 DIAGNOSIS — Q87.40 MARFAN'S SYNDROME: ICD-10-CM

## 2018-10-25 RX ORDER — BUSPIRONE HYDROCHLORIDE 5 MG/1
TABLET ORAL
Qty: 90 TABLET | Refills: 0 | Status: SHIPPED | OUTPATIENT
Start: 2018-10-25 | End: 2018-11-23

## 2018-11-09 ENCOUNTER — OFFICE VISIT (OUTPATIENT)
Dept: INTERNAL MEDICINE | Facility: OTHER | Age: 73
End: 2018-11-09
Attending: INTERNAL MEDICINE
Payer: COMMERCIAL

## 2018-11-09 VITALS — DIASTOLIC BLOOD PRESSURE: 72 MMHG | HEART RATE: 84 BPM | SYSTOLIC BLOOD PRESSURE: 118 MMHG | RESPIRATION RATE: 18 BRPM

## 2018-11-09 DIAGNOSIS — I87.2 VENOUS (PERIPHERAL) INSUFFICIENCY: ICD-10-CM

## 2018-11-09 DIAGNOSIS — M10.9 GOUT, UNSPECIFIED CAUSE, UNSPECIFIED CHRONICITY, UNSPECIFIED SITE: ICD-10-CM

## 2018-11-09 DIAGNOSIS — I71.20 THORACIC AORTIC ANEURYSM WITHOUT RUPTURE (H): ICD-10-CM

## 2018-11-09 DIAGNOSIS — N39.41 URGE INCONTINENCE OF URINE: ICD-10-CM

## 2018-11-09 DIAGNOSIS — K59.00 CONSTIPATION, UNSPECIFIED CONSTIPATION TYPE: ICD-10-CM

## 2018-11-09 DIAGNOSIS — K29.60 OTHER GASTRITIS WITHOUT HEMORRHAGE, UNSPECIFIED CHRONICITY: ICD-10-CM

## 2018-11-09 DIAGNOSIS — Q87.40 MARFAN'S SYNDROME: Primary | ICD-10-CM

## 2018-11-09 DIAGNOSIS — R73.09 ABNORMAL GLUCOSE: ICD-10-CM

## 2018-11-09 LAB
ALBUMIN SERPL-MCNC: 4.4 G/DL (ref 3.5–5.7)
ALP SERPL-CCNC: 110 U/L (ref 34–104)
ALT SERPL W P-5'-P-CCNC: 11 U/L (ref 7–52)
ANION GAP SERPL CALCULATED.3IONS-SCNC: 9 MMOL/L (ref 3–14)
AST SERPL W P-5'-P-CCNC: 16 U/L (ref 13–39)
BILIRUB SERPL-MCNC: 1.5 MG/DL (ref 0.3–1)
BUN SERPL-MCNC: 14 MG/DL (ref 7–25)
CALCIUM SERPL-MCNC: 9.7 MG/DL (ref 8.6–10.3)
CHLORIDE SERPL-SCNC: 102 MMOL/L (ref 98–107)
CO2 SERPL-SCNC: 30 MMOL/L (ref 21–31)
CREAT SERPL-MCNC: 0.84 MG/DL (ref 0.7–1.3)
GFR SERPL CREATININE-BSD FRML MDRD: 90 ML/MIN/1.7M2
GLUCOSE SERPL-MCNC: 99 MG/DL (ref 70–105)
HBA1C MFR BLD: 5.9 % (ref 4–6)
POTASSIUM SERPL-SCNC: 3.7 MMOL/L (ref 3.5–5.1)
PROT SERPL-MCNC: 7.1 G/DL (ref 6.4–8.9)
SODIUM SERPL-SCNC: 141 MMOL/L (ref 134–144)

## 2018-11-09 PROCEDURE — 80053 COMPREHEN METABOLIC PANEL: CPT | Performed by: INTERNAL MEDICINE

## 2018-11-09 PROCEDURE — 99215 OFFICE O/P EST HI 40 MIN: CPT | Performed by: INTERNAL MEDICINE

## 2018-11-09 PROCEDURE — 83036 HEMOGLOBIN GLYCOSYLATED A1C: CPT | Performed by: INTERNAL MEDICINE

## 2018-11-09 PROCEDURE — G0463 HOSPITAL OUTPT CLINIC VISIT: HCPCS

## 2018-11-09 PROCEDURE — 36415 COLL VENOUS BLD VENIPUNCTURE: CPT | Performed by: INTERNAL MEDICINE

## 2018-11-09 RX ORDER — POTASSIUM CHLORIDE 750 MG/1
10 TABLET, EXTENDED RELEASE ORAL DAILY
Qty: 90 TABLET | COMMUNITY
Start: 2018-11-09 | End: 2019-04-17

## 2018-11-09 RX ORDER — LORAZEPAM 2 MG/1
TABLET ORAL
Qty: 15 TABLET | COMMUNITY
Start: 2018-11-09 | End: 2018-11-20

## 2018-11-09 RX ORDER — HALOPERIDOL 5 MG/1
TABLET ORAL
COMMUNITY
Start: 2018-11-09 | End: 2018-11-20

## 2018-11-09 RX ORDER — ACETAMINOPHEN 500 MG
1000 TABLET ORAL 2 TIMES DAILY
Qty: 100 TABLET | Refills: 0 | COMMUNITY
Start: 2018-11-09 | End: 2019-02-15

## 2018-11-09 ASSESSMENT — PATIENT HEALTH QUESTIONNAIRE - PHQ9: SUM OF ALL RESPONSES TO PHQ QUESTIONS 1-9: 0

## 2018-11-09 NOTE — PROGRESS NOTES
Chief Complaint:  This patient is here for a comprehensive review of their multiple medical problems, renewal of medications and update on necessary health maintenance issues.    HPI: Eliezer comes in today for yearly physical.  His past medical history is well documented and reviewed and updated today.  In most respects he is stable and doing well.  2 or 3 days ago while at work it was reported that he seemed to be short of breath.  Staff at his facility have never noticed this.  Apparently the shortness of breath was short-lived and the patient was not particularly distraught or bothered by this.  We have decided not to really get too concerned about this and less it is a recurrent problem.    His behaviors have been stable.  His bowel function and bladder function have been stable with medications as listed below.  He has not had any abdominal pain, he does have a history of acute recurrent pancreatitis, etiology unknown.  He has a history of hyperglycemia and is due for a recheck A1c.  He is eating well and he is not having peripheral vascular or edema problems or difficulties.    Medications are reconciled.  Past medical history, past surgical history, family history and social histories are reviewed and updated.  He gets a flu shot at his facility.  He is due for a Prevnar.    Past Medical History:   Diagnosis Date     Acute pancreatitis without infection or necrosis     Recurrent     Aortic aneurysm, intrathoracic (H)      Chronic constipation      Gastritis without bleeding     Nonsteroidal induced gastritis with GI bleed     Glaucoma      Gout     12/9/2013     Hyperglycemia      Intellectual disability     No Comments Provided     Marfan's syndrome     No Comments Provided     Other specified disorders of veins     Chronic venous stasis     Situs inversus     No Comments Provided     Urinary incontinence     No Comments Provided       Past Surgical History:   Procedure Laterality Date     CHOLECYSTECTOMY   2010       Current Outpatient Prescriptions   Medication Sig Dispense Refill     acetaminophen (TYLENOL) 500 MG tablet Take 2 tablets (1,000 mg) by mouth 2 times daily 100 tablet 0     allopurinol (ZYLOPRIM) 300 MG tablet Take 1 tablet (300 mg) by mouth daily In the AM. 90 tablet 3     busPIRone (BUSPAR) 5 MG tablet TAKE 1 TABLET BY MOUTH THREE TIMES DAILY (7AM,3PM,8PM) 90 tablet 0     docusate sodium (DOCQLACE) 100 MG capsule Take 1 capsule (100 mg) by mouth 2 times daily AM and 8PM 100 capsule 6     furosemide (LASIX) 40 MG tablet Take 2 tablets by mouth daily At 7 AM       haloperidol (HALDOL) 5 MG tablet Prior to appointment prn       latanoprost (XALATAN) 0.005 % ophthalmic solution Place 1 drop into both eyes At Bedtime       LORazepam (ATIVAN) 2 MG tablet Prior to appointment prn 15 tablet      OLANZapine (ZYPREXA) 5 MG tablet TAKE ONE TABLET BY MOUTH AT 8PM 90 tablet 3     omeprazole (PRILOSEC) 20 MG CR capsule Take 1 capsule (20 mg) by mouth daily 30 capsule      potassium chloride (K-TAB,KLOR-CON) 10 MEQ tablet Take 1 tablet (10 mEq) by mouth daily 90 tablet      psyllium 30.9 % POWD Take 1 Tablespoonful by mouth daily Mix in 8 ounces of water or orange juice. 1 Bottle 6     senna (SENNA-TIME) 8.6 MG tablet Take 1 tablet by mouth 2 times daily 180 tablet 0     tamsulosin (FLOMAX) 0.4 MG capsule Take 1 capsule (0.4 mg) by mouth daily 90 capsule 0     timolol (TIMOPTIC-XE) 0.5 % ophthalmic gel-form INSTILL 1 DROP IN BOTH EYES EVERY MORNING       [DISCONTINUED] haloperidol (HALDOL) 5 MG tablet          No Known Allergies    History reviewed. No pertinent family history.    Social History     Social History     Marital status: Single     Spouse name: N/A     Number of children: N/A     Years of education: N/A     Occupational History     Not on file.     Social History Main Topics     Smoking status: Former Smoker     Quit date: 1/28/2004     Smokeless tobacco: Never Used     Alcohol use No     Drug use: No       Comment: Drug use: No     Sexual activity: Not on file     Other Topics Concern     Not on file     Social History Narrative    Patient is a [facility] of the [facility].  Legal guardian is his sister, eJssica Altamirano.  Code status is DNR--therapeutic.       Review of Systems   Unable to perform ROS: Other       Physical Exam   Constitutional: He is oriented to person, place, and time. He appears well-developed and well-nourished. No distress.   HENT:   Head: Normocephalic.   Right Ear: External ear normal.   Left Ear: External ear normal.   Nose: Nose normal.   Mouth/Throat: Oropharynx is clear and moist.   Moderate ceruminosis bilaterally but staff reports that he does not seem to have any problems or hearing difficulties so this is not treated.   Eyes: Conjunctivae are normal. Pupils are equal, round, and reactive to light.   Neck: Normal range of motion. Neck supple. Normal carotid pulses and no JVD present. Carotid bruit is not present. No tracheal deviation present. No thyromegaly present.   Cardiovascular: Normal rate and regular rhythm.  Exam reveals no gallop and no friction rub.    No murmur heard.  Heart sounds displaced more to the right   Pulmonary/Chest: Effort normal and breath sounds normal. No respiratory distress. He has no wheezes. He has no rales.   Question slight decreased breath sounds right base although he is not taking big breaths on exam.   Abdominal: Soft. Bowel sounds are normal. He exhibits no distension and no mass. There is no tenderness. There is no rebound and no guarding. No hernia.   Musculoskeletal: Normal range of motion. He exhibits no edema.   Lymphadenopathy:     He has no cervical adenopathy.   Neurological: He is alert and oriented to person, place, and time. He has normal reflexes. No cranial nerve deficit. He exhibits normal muscle tone. Coordination normal.   Skin: Skin is warm and dry. No rash noted. He is not diaphoretic.   Psychiatric: He has a normal mood  and affect. His behavior is normal.   Nursing note and vitals reviewed.      Assessment:      ICD-10-CM    1. Marfan's syndrome Q87.40 Comprehensive Metabolic Panel     Comprehensive Metabolic Panel   2. Abnormal glucose R73.09 Hemoglobin A1c     Hemoglobin A1c   3. Gout, unspecified cause, unspecified chronicity, unspecified site M10.9    4. Constipation, unspecified constipation type K59.00    5. Urge incontinence of urine N39.41    6. Venous (peripheral) insufficiency I87.2    7. Other gastritis without hemorrhage, unspecified chronicity K29.60    8. Thoracic aortic aneurysm without rupture (H) I71.2         Plan: He appears to be stable at this point in time.  Medications will continue without change.  He is due for a Prevnar so I suggested that the home check with his sister Jessica to see if she is in agreement, if so we can certainly give this vaccination.  Lab is drawn and pending, I will send a letter with the results.  If he has any further problems with shortness of breath, we may want to evaluate further including additional labs, chest x-ray and EKG as deemed indicated.  He will have to be brought back for that.  I did not want to launch into any significant investigation at this time with just the one reported episode.  If all goes well, he will follow-up annually.

## 2018-11-09 NOTE — LETTER
November 9, 2018      Dhiraj De La Cruz  [address] [facility] [address]        Dear Eliezer and caregivers,    Below are the results of your recent labs:    Results for orders placed or performed in visit on 11/09/18   Comprehensive Metabolic Panel   Result Value Ref Range    Sodium 141 134 - 144 mmol/L    Potassium 3.7 3.5 - 5.1 mmol/L    Chloride 102 98 - 107 mmol/L    Carbon Dioxide 30 21 - 31 mmol/L    Anion Gap 9 3 - 14 mmol/L    Glucose 99 70 - 105 mg/dL    Urea Nitrogen 14 7 - 25 mg/dL    Creatinine 0.84 0.70 - 1.30 mg/dL    GFR Estimate 90 >60 mL/min/1.7m2    GFR Estimate If Black >90 >60 mL/min/1.7m2    Calcium 9.7 8.6 - 10.3 mg/dL    Bilirubin Total 1.5 (H) 0.3 - 1.0 mg/dL    Albumin 4.4 3.5 - 5.7 g/dL    Protein Total 7.1 6.4 - 8.9 g/dL    Alkaline Phosphatase 110 (H) 34 - 104 U/L    ALT 11 7 - 52 U/L    AST 16 13 - 39 U/L   Hemoglobin A1c   Result Value Ref Range    Hemoglobin A1C 5.9 4.0 - 6.0 %        Your blood tests look fine.  Congratulations on this report.    Sincerely,        Armaan Gallego MD  Internal Medicine  [facility]

## 2018-11-09 NOTE — NURSING NOTE
The patient is here today to get a refill on his medications.  Jami Cote LPN on 11/9/2018 at 9:12 AM  Chief Complaint   Patient presents with     Recheck Medication       Initial /72 (BP Location: Right arm, Patient Position: Sitting, Cuff Size: Adult Large)  Pulse 84  Resp 18 Estimated body mass index is 23.06 kg/(m^2) as calculated from the following:    Height as of 3/28/18: 6' (1.829 m).    Weight as of 3/28/18: 170 lb (77.1 kg).  Medication Reconciliation: incomplete    Jami Cote LPN

## 2018-11-09 NOTE — MR AVS SNAPSHOT
After Visit Summary   11/9/2018    Dhiraj De La Cruz    MRN: [id number]           Patient Information     Date Of Birth          1945        Visit Information        Provider Department      11/9/2018 9:20 AM Armaan Gallego MD [facility]        Today's Diagnoses     Marfan's syndrome    -  1    Abnormal glucose        Gout, unspecified cause, unspecified chronicity, unspecified site        Constipation, unspecified constipation type        Urge incontinence of urine        Venous (peripheral) insufficiency        Other gastritis without hemorrhage, unspecified chronicity        Thoracic aortic aneurysm without rupture (H)           Follow-ups after your visit        Who to contact     If you have questions or need follow up information about today's clinic visit or your schedule please contact [facility] AND [facility] directly at [phone].  Normal or non-critical lab and imaging results will be communicated to you by [employer], letter or phone within 4 business days after the clinic has received the results. If you do not hear from us within 7 days, please contact the clinic through [employer] or phone. If you have a critical or abnormal lab result, we will notify you by phone as soon as possible.  Submit refill requests through [employer] or call your pharmacy and they will forward the refill request to us. Please allow 3 business days for your refill to be completed.          Additional Information About Your Visit        MyChart Information     [employer] gives you secure access to your electronic health record. If you see a primary care provider, you can also send messages to your care team and make appointments. If you have questions, please call your primary care clinic.  If you do not have a primary care provider, please call [phone] and they will assist you.        Care EveryWhere ID     This is your Care EveryWhere ID. This could be used by other organizations to  access your [place] medical records  [id number]        Your Vitals Were     Pulse Respirations                84 18           Blood Pressure from Last 3 Encounters:   11/09/18 118/72   03/28/18 114/70   03/28/18 128/80    Weight from Last 3 Encounters:   03/28/18 170 lb (77.1 kg)   02/09/16 165 lb (74.8 kg)   01/25/16 165 lb (74.8 kg)              We Performed the Following     Comprehensive Metabolic Panel     Hemoglobin A1c          Today's Medication Changes          These changes are accurate as of 11/9/18 10:06 AM.  If you have any questions, ask your nurse or doctor.               These medicines have changed or have updated prescriptions.        Dose/Directions    haloperidol 5 MG tablet   Commonly known as:  HALDOL   This may have changed:    - how to take this  - additional instructions   Changed by:  Armaan Gallego MD        Prior to appointment prn   Refills:  0       LORazepam 2 MG tablet   Commonly known as:  ATIVAN   This may have changed:    - how to take this  - additional instructions   Changed by:  Armaan Gallego MD        Prior to appointment prn   Quantity:  15 tablet   Refills:  0       omeprazole 20 MG CR capsule   Commonly known as:  priLOSEC   This may have changed:    - how much to take  - when to take this   Changed by:  Armaan Gallego MD        Dose:  20 mg   Take 1 capsule (20 mg) by mouth daily   Quantity:  30 capsule   Refills:  0       potassium chloride 10 MEQ tablet   Commonly known as:  K-TAB,KLOR-CON   This may have changed:    - how much to take  - when to take this   Changed by:  Armaan Gallego MD        Dose:  10 mEq   Take 1 tablet (10 mEq) by mouth daily   Quantity:  90 tablet   Refills:  0       TYLENOL 500 MG tablet   This may have changed:  Another medication with the same name was removed. Continue taking this medication, and follow the directions you see here.   Generic drug:  acetaminophen   Changed by:  Armaan Gallego MD        Dose:  1000 mg   Take 2 tablets  (1,000 mg) by mouth 2 times daily   Quantity:  100 tablet   Refills:  0         Stop taking these medicines if you haven't already. Please contact your care team if you have questions.     AMITIZA PO   Stopped by:  Armaan Gallego MD           azithromycin 250 MG tablet   Commonly known as:  ZITHROMAX   Stopped by:  Armaan Gallego MD           FLUCELVAX QUADRIVALENT 0.5 ML Oralia   Generic drug:  Influenza Vac Subunit Quad   Stopped by:  Armaan Gallego MD           METHYLCELLULOSE (LAXATIVE) PO   Stopped by:  Armaan Gallego MD           order for DME   Stopped by:  Armaan Gallego MD                    Primary Care Provider Office Phone # Fax #    Armaan Gallego [phone] [address] [facility] [address]        Equal Access to Services     [facility]: Hadii aad xavi hadasho Soomaali, waaxda luqadaha, qaybta kaalmada adeegyada, waxay maritoin hayroxanne carreno . So [facility] [phone].    ATENCIÓN: Si habla español, tiene a olvera disposición servicios gratuitos de asistencia lingüística. Llame al [phone].    We comply with applicable federal civil rights laws and [place] laws. We do not discriminate on the basis of race, color, national origin, age, disability, sex, sexual orientation, or gender identity.            Thank you!     Thank you for choosing [facility] AND [facility]  for your care. Our goal is always to provide you with excellent care. Hearing back from our patients is one way we can continue to improve our services. Please take a few minutes to complete the written survey that you may receive in the mail after your visit with us. Thank you!             Your Updated Medication List - Protect others around you: Learn how to safely use, store and throw away your medicines at www.disposemymeds.org.          This list is accurate as of 11/9/18 10:06 AM.  Always use your most recent med list.                   Brand Name Dispense Instructions for use  Diagnosis    allopurinol 300 MG tablet    ZYLOPRIM    90 tablet    Take 1 tablet (300 mg) by mouth daily In the AM.    Gout, unspecified cause, unspecified chronicity, unspecified site       busPIRone 5 MG tablet    BUSPAR    90 tablet    TAKE 1 TABLET BY MOUTH THREE TIMES DAILY (7AM,3PM,8PM)    Marfan's syndrome       docusate sodium 100 MG capsule    DOCQLACE    100 capsule    Take 1 capsule (100 mg) by mouth 2 times daily AM and 8PM    Constipation       furosemide 40 MG tablet    LASIX     Take 2 tablets by mouth daily At 7 AM        haloperidol 5 MG tablet    HALDOL     Prior to appointment prn        latanoprost 0.005 % ophthalmic solution    XALATAN     Place 1 drop into both eyes At Bedtime        LORazepam 2 MG tablet    ATIVAN    15 tablet    Prior to appointment prn        OLANZapine 5 MG tablet    zyPREXA    90 tablet    TAKE ONE TABLET BY MOUTH AT 8PM    Mental retardation       omeprazole 20 MG CR capsule    priLOSEC    30 capsule    Take 1 capsule (20 mg) by mouth daily        potassium chloride 10 MEQ tablet    K-TAB,KLOR-CON    90 tablet    Take 1 tablet (10 mEq) by mouth daily        psyllium 30.9 % Powd     1 Bottle    Take 1 Tablespoonful by mouth daily Mix in 8 ounces of water or orange juice.    Constipation, unspecified constipation type       senna 8.6 MG tablet    SENNA-TIME    180 tablet    Take 1 tablet by mouth 2 times daily    Constipation, unspecified constipation type       tamsulosin 0.4 MG capsule    FLOMAX    90 capsule    Take 1 capsule (0.4 mg) by mouth daily    Urge incontinence of urine       timolol 0.5 % ophthalmic gel-form    TIMOPTIC-XE     INSTILL 1 DROP IN BOTH EYES EVERY MORNING        TYLENOL 500 MG tablet   Generic drug:  acetaminophen     100 tablet    Take 2 tablets (1,000 mg) by mouth 2 times daily

## 2018-11-14 DIAGNOSIS — K59.00 CONSTIPATION, UNSPECIFIED CONSTIPATION TYPE: ICD-10-CM

## 2018-11-14 DIAGNOSIS — N39.41 URGE INCONTINENCE OF URINE: ICD-10-CM

## 2018-11-15 NOTE — TELEPHONE ENCOUNTER
Routing refill request to provider for review/approval because:  Drug not on the [facility] refill protocol     LOV ;11/9/18  Dr. Gallego out of office will route to team let for review and consideration.    Chrissie Travis RN on 11/15/2018 at 4:10 PM

## 2018-11-16 RX ORDER — TAMSULOSIN HYDROCHLORIDE 0.4 MG/1
0.4 CAPSULE ORAL DAILY
Qty: 90 CAPSULE | Refills: 3 | Status: SHIPPED | OUTPATIENT
Start: 2018-11-16 | End: 2019-10-16

## 2018-11-16 RX ORDER — SENNOSIDES 8.6 MG
TABLET ORAL
Qty: 180 TABLET | Refills: 3 | Status: SHIPPED | OUTPATIENT
Start: 2018-11-16 | End: 2019-10-16

## 2018-11-16 NOTE — TELEPHONE ENCOUNTER
Prescription approved per [facility] Refill Protocol.  LOV: 11/9/18 medication to remain same and patient to follow up annually  Chrissie Travis RN on 11/16/2018 at 11:21 AM

## 2018-11-20 DIAGNOSIS — Q87.40 MARFAN'S SYNDROME: ICD-10-CM

## 2018-11-20 DIAGNOSIS — F69 BEHAVIOR PROBLEM, ADULT: Primary | ICD-10-CM

## 2018-11-20 RX ORDER — LATANOPROST 50 UG/ML
1 SOLUTION/ DROPS OPHTHALMIC
COMMUNITY
Start: 2017-12-05 | End: 2019-10-15

## 2018-11-20 RX ORDER — LORAZEPAM 2 MG/1
TABLET ORAL
Qty: 15 TABLET | Refills: 1 | Status: SHIPPED | OUTPATIENT
Start: 2018-11-20 | End: 2020-02-06

## 2018-11-20 RX ORDER — HALOPERIDOL 5 MG/1
TABLET ORAL
Qty: 15 TABLET | Refills: 1 | Status: SHIPPED | OUTPATIENT
Start: 2018-11-20 | End: 2019-12-12

## 2018-11-20 NOTE — TELEPHONE ENCOUNTER
[facility] #728 sent Rx request for the following:     haloperidol (HALDOL) 5 MG tablet  Prior to appointment prn  Last Written Prescription:  haloperidol (HALDOL) 5 mg tablet 10 tablet 0 3/2/2017  No   Sig: TAKE AS DIRECTED 1 HOUR BEFORE APPOINTMENT/NAIL TRIMMING     LORazepam (ATIVAN) 2 MG tablet  Prior to appointment prn  Last Written Prescription:  LORazepam (ATIVAN) 2 mg tab 10 tablet 0 3/2/2017  No   Sig: TAKE ONE TABLET BY MOUTH AT BEDTIME IF NEEDED. GIVE 30 MINUTES PRIOR TO APPOINTMENTS/NAIL TRIMMING/ETC.     Last Office Visit: 11/9/18  Future Office visit: None.  Routing refill request to provider for review/approval because:    Drugs not on the [facility] or [facility] refill protocol or controlled substance    Unable to complete prescription refill per RN Medication Refill Policy. Pooja Carlos RN .............. 11/20/2018  11:30 AM

## 2018-11-26 RX ORDER — BUSPIRONE HYDROCHLORIDE 5 MG/1
5 TABLET ORAL 3 TIMES DAILY
Qty: 90 TABLET | Refills: 11 | Status: SHIPPED | OUTPATIENT
Start: 2018-11-26 | End: 2019-11-19

## 2018-11-26 NOTE — TELEPHONE ENCOUNTER
Prescription approved per [facility] Refill Protocol.  Pooja Duarte RN on 11/26/2018 at 3:02 PM

## 2018-11-27 ENCOUNTER — TELEPHONE (OUTPATIENT)
Dept: INTERNAL MEDICINE | Facility: OTHER | Age: 73
End: 2018-11-27

## 2018-11-27 DIAGNOSIS — F69 BEHAVIOR PROBLEM, ADULT: ICD-10-CM

## 2018-11-27 RX ORDER — HALOPERIDOL 5 MG/1
TABLET ORAL
Qty: 15 TABLET | Refills: 1 | Status: CANCELLED | OUTPATIENT
Start: 2018-11-27

## 2018-11-27 NOTE — TELEPHONE ENCOUNTER
Per Lauren, pharmacy tech, they are still looking for clarification of dose of Haldol. They received faxed clarification for Lorazepam only. Patient is also calling, please send or call regarding dose of PRN Haldol. Pooja Duarte RN on 11/27/2018 at 10:12 AM

## 2018-12-15 DIAGNOSIS — R60.9 EDEMA: ICD-10-CM

## 2018-12-15 DIAGNOSIS — R60.0 PERIPHERAL EDEMA: Primary | ICD-10-CM

## 2018-12-15 DIAGNOSIS — K29.60 OTHER GASTRITIS WITHOUT HEMORRHAGE, UNSPECIFIED CHRONICITY: ICD-10-CM

## 2018-12-18 RX ORDER — FUROSEMIDE 40 MG
80 TABLET ORAL DAILY
Qty: 180 TABLET | Refills: 3 | Status: SHIPPED | OUTPATIENT
Start: 2018-12-18 | End: 2019-11-18

## 2018-12-18 NOTE — TELEPHONE ENCOUNTER
Routing refill request to provider for review/approval because:  Medication is reported/historical    LOV: 11/9/18  Chrissie Travis RN on 12/18/2018 at 2:04 PM

## 2018-12-18 NOTE — TELEPHONE ENCOUNTER
This is a Refill request from: [facility]  Name of Medication and Dose:  Furosemide 40mg tablet-Take 2 tablets by mouth once daily.  Quantity requested:  180  Last fill date:  12/14/2018   Last Office Visit: 11/09/2018   PCP:  Dr. Gallego  Associated Diagnosis: Edema      Rush Levy, SAKSHI 12/18/18 1:51 PM      This is a Refill request from: [facility]   Name of Medication and Dose:  Omeprazole 20mg Dr Capsule- Take one capsule by mouth everyday at 3pm.  Quantity requested:  180  Last fill date: 12/14/2018  Associated Diagnosis: Gastritis       Rush Levy, SAKSHI 12/18/18 1:55 PM

## 2019-01-06 NOTE — NURSING NOTE
Patient Information     Patient Name MRN Dhiraj Blake [id number] Male 1945      Nursing Note by Rahel New at 2017 10:45 AM     Author:  Rahel New Service:  (none) Author Type:  (none)     Filed:  2017 10:55 AM Encounter Date:  2017 Status:  Signed     :  Rahel New            Patient presents to the clinic for unproductive cough x1 week. Patient's caregiver reports patient having a fluctuating body temperature of 98.4 to 99.9 degrees that past week also and says the patient seems to be sleeping more than usual. He has taken mucinex with some relief.  Rahel DUNLAP CMA.......2017..10:46 AM          
parietal

## 2019-02-01 ENCOUNTER — ALLIED HEALTH/NURSE VISIT (OUTPATIENT)
Dept: FAMILY MEDICINE | Facility: OTHER | Age: 74
End: 2019-02-01
Attending: INTERNAL MEDICINE
Payer: COMMERCIAL

## 2019-02-01 DIAGNOSIS — Z23 NEED FOR VACCINATION: Primary | ICD-10-CM

## 2019-02-01 PROCEDURE — G0009 ADMIN PNEUMOCOCCAL VACCINE: HCPCS

## 2019-02-01 PROCEDURE — 90670 PCV13 VACCINE IM: CPT

## 2019-02-01 PROCEDURE — 90471 IMMUNIZATION ADMIN: CPT

## 2019-02-01 NOTE — PROGRESS NOTES
Pt denies allergies to yeast gelatin neosporin eggs thimerasol or latex or past reactions to vaccinations. Verified name and date of birth  Copy of MIIC given. To staff member with him , who vouched for name  for ID. Pt in wheelchair.   Donna Cevallos RN on 2019 at 12:55 PM

## 2019-02-15 DIAGNOSIS — M19.90 OSTEOARTHRITIS, UNSPECIFIED OSTEOARTHRITIS TYPE, UNSPECIFIED SITE: Primary | ICD-10-CM

## 2019-02-15 DIAGNOSIS — R52 PAIN: ICD-10-CM

## 2019-02-15 RX ORDER — ACETAMINOPHEN 500 MG
1000 TABLET ORAL 2 TIMES DAILY
Qty: 100 TABLET | Refills: 10 | Status: SHIPPED | OUTPATIENT
Start: 2019-02-15 | End: 2019-11-18

## 2019-02-15 NOTE — TELEPHONE ENCOUNTER
[facility] #728 sent Rx request for the following:      acetaminophen (TYLENOL) 500 MG tablet  Sig: Take 2 tablets (1,000 mg) by mouth 2 times daily  Last Written Prescription Date:  1/25/18  Last Fill Quantity: 360,   # refills: 2    Last Office Visit: 11/9/18 (no change noted)  Future Office visit:  None. Due for f/u around 11/9/19.    Prescription approved per [facility] Refill Protocol for #100 and 10 additional refills at this time. Pooja Carlos RN .............. 2/15/2019  1:38 PM

## 2019-04-17 DIAGNOSIS — R60.9 EDEMA, UNSPECIFIED TYPE: Primary | ICD-10-CM

## 2019-04-17 DIAGNOSIS — M10.9 GOUT, UNSPECIFIED CAUSE, UNSPECIFIED CHRONICITY, UNSPECIFIED SITE: ICD-10-CM

## 2019-04-19 RX ORDER — POTASSIUM CHLORIDE 750 MG/1
10 TABLET, EXTENDED RELEASE ORAL DAILY
Qty: 90 TABLET | Refills: 3 | Status: SHIPPED | OUTPATIENT
Start: 2019-04-19 | End: 2020-03-18

## 2019-04-19 NOTE — TELEPHONE ENCOUNTER
Routing refill request to provider for review/approval because:  Labs not current:  Uric acid, CBC    LOV 11-9-18.  Allopurinol last filled 6-18-18 for #90 X 3 refills.  Pooja Duarte RN on 4/19/2019 at 3:45 PM

## 2019-04-22 RX ORDER — ALLOPURINOL 300 MG/1
300 TABLET ORAL DAILY
Qty: 90 TABLET | Refills: 3 | Status: SHIPPED | OUTPATIENT
Start: 2019-04-22 | End: 2020-03-18

## 2019-05-09 DIAGNOSIS — R32 URINARY INCONTINENCE, UNSPECIFIED TYPE: Primary | ICD-10-CM

## 2019-05-13 NOTE — TELEPHONE ENCOUNTER
Routing refill request to provider for review/approval because:  Drug not on the [facility] refill protocol     LOV: 11/9/18  Chrissie Travis RN on 5/13/2019 at 9:45 AM

## 2019-07-17 DIAGNOSIS — K59.00 CONSTIPATION: ICD-10-CM

## 2019-07-18 RX ORDER — DOCUSATE SODIUM 100 MG/1
100 CAPSULE, LIQUID FILLED ORAL 2 TIMES DAILY
Qty: 100 CAPSULE | Refills: 1 | Status: SHIPPED | OUTPATIENT
Start: 2019-07-18 | End: 2019-10-16

## 2019-07-18 NOTE — TELEPHONE ENCOUNTER
"Requested Prescriptions   Pending Prescriptions Disp Refills     docusate sodium (DOCQLACE) 100 MG capsule 100 capsule 6     Sig: Take 1 capsule (100 mg) by mouth 2 times daily AM and 8PM       Laxatives Protocol Passed - 7/17/2019 10:52 AM        Passed - Patient is age 6 or older        Passed - Recent (12 mo) or future (30 days) visit within the authorizing provider's specialty     Patient had office visit in the last 12 months or has a visit in the next 30 days with authorizing provider or within the authorizing provider's specialty.  See \"Patient Info\" tab in inbasket, or \"Choose Columns\" in Meds & Orders section of the refill encounter.              Passed - Medication is active on med list        Prescription approved per [facility] [place] Protocol.    "

## 2019-08-16 DIAGNOSIS — F79 INTELLECTUAL DISABILITY: ICD-10-CM

## 2019-08-19 RX ORDER — OLANZAPINE 5 MG/1
TABLET ORAL
Qty: 90 TABLET | Refills: 3 | Status: SHIPPED | OUTPATIENT
Start: 2019-08-19 | End: 2020-07-20

## 2019-08-19 NOTE — TELEPHONE ENCOUNTER
Routing refill request to provider for review/approval because:  Labs not current:  CBC, lipids    Filled 10-2-18 for #90 X 3 refills. LOV 11-9-18. Pooja uDarte RN on 8/19/2019 at 1:21 PM

## 2019-08-22 DIAGNOSIS — K59.00 CONSTIPATION, UNSPECIFIED CONSTIPATION TYPE: ICD-10-CM

## 2019-08-23 NOTE — TELEPHONE ENCOUNTER
Routing refill request to provider for review/approval because:  Drug not on the [facility] refill protocol     LOV: 11/9/18  Chrissie Travis RN on 8/23/2019 at 4:21 PM

## 2019-10-15 ENCOUNTER — OFFICE VISIT (OUTPATIENT)
Dept: INTERNAL MEDICINE | Facility: OTHER | Age: 74
End: 2019-10-15
Attending: INTERNAL MEDICINE
Payer: COMMERCIAL

## 2019-10-15 VITALS
TEMPERATURE: 97.3 F | HEART RATE: 84 BPM | DIASTOLIC BLOOD PRESSURE: 86 MMHG | RESPIRATION RATE: 16 BRPM | SYSTOLIC BLOOD PRESSURE: 134 MMHG

## 2019-10-15 DIAGNOSIS — F69 BEHAVIOR PROBLEM, ADULT: ICD-10-CM

## 2019-10-15 DIAGNOSIS — I87.2 VENOUS (PERIPHERAL) INSUFFICIENCY: ICD-10-CM

## 2019-10-15 DIAGNOSIS — F79 INTELLECTUAL DISABILITY: ICD-10-CM

## 2019-10-15 DIAGNOSIS — I71.20 THORACIC AORTIC ANEURYSM WITHOUT RUPTURE (H): ICD-10-CM

## 2019-10-15 DIAGNOSIS — R73.09 ABNORMAL GLUCOSE: ICD-10-CM

## 2019-10-15 DIAGNOSIS — K29.60 OTHER GASTRITIS WITHOUT HEMORRHAGE, UNSPECIFIED CHRONICITY: ICD-10-CM

## 2019-10-15 DIAGNOSIS — K59.00 CONSTIPATION, UNSPECIFIED CONSTIPATION TYPE: ICD-10-CM

## 2019-10-15 DIAGNOSIS — Q87.40 MARFAN'S SYNDROME: Primary | ICD-10-CM

## 2019-10-15 PROBLEM — K85.90 ACUTE PANCREATITIS: Status: RESOLVED | Noted: 2018-02-12 | Resolved: 2019-10-15

## 2019-10-15 LAB
ALBUMIN SERPL-MCNC: 4.5 G/DL (ref 3.5–5.7)
ALP SERPL-CCNC: 114 U/L (ref 34–104)
ALT SERPL W P-5'-P-CCNC: 9 U/L (ref 7–52)
ANION GAP SERPL CALCULATED.3IONS-SCNC: 12 MMOL/L (ref 3–14)
AST SERPL W P-5'-P-CCNC: 15 U/L (ref 13–39)
BILIRUB SERPL-MCNC: 1 MG/DL (ref 0.3–1)
BUN SERPL-MCNC: 14 MG/DL (ref 7–25)
CALCIUM SERPL-MCNC: 9.4 MG/DL (ref 8.6–10.3)
CHLORIDE SERPL-SCNC: 99 MMOL/L (ref 98–107)
CO2 SERPL-SCNC: 27 MMOL/L (ref 21–31)
CREAT SERPL-MCNC: 0.87 MG/DL (ref 0.7–1.3)
ERYTHROCYTE [DISTWIDTH] IN BLOOD BY AUTOMATED COUNT: 13.4 % (ref 10–15)
GFR SERPL CREATININE-BSD FRML MDRD: 86 ML/MIN/{1.73_M2}
GLUCOSE SERPL-MCNC: 226 MG/DL (ref 70–105)
HBA1C MFR BLD: 5.9 % (ref 4–6)
HCT VFR BLD AUTO: 45.4 % (ref 40–53)
HGB BLD-MCNC: 15.3 G/DL (ref 13.3–17.7)
MCH RBC QN AUTO: 30.4 PG (ref 26.5–33)
MCHC RBC AUTO-ENTMCNC: 33.7 G/DL (ref 31.5–36.5)
MCV RBC AUTO: 90 FL (ref 78–100)
PLATELET # BLD AUTO: 247 10E9/L (ref 150–450)
POTASSIUM SERPL-SCNC: 3.7 MMOL/L (ref 3.5–5.1)
PROT SERPL-MCNC: 7.8 G/DL (ref 6.4–8.9)
RBC # BLD AUTO: 5.03 10E12/L (ref 4.4–5.9)
SODIUM SERPL-SCNC: 138 MMOL/L (ref 134–144)
WBC # BLD AUTO: 8.6 10E9/L (ref 4–11)

## 2019-10-15 PROCEDURE — 90686 IIV4 VACC NO PRSV 0.5 ML IM: CPT

## 2019-10-15 PROCEDURE — 83036 HEMOGLOBIN GLYCOSYLATED A1C: CPT | Mod: ZL | Performed by: INTERNAL MEDICINE

## 2019-10-15 PROCEDURE — 80053 COMPREHEN METABOLIC PANEL: CPT | Mod: ZL | Performed by: INTERNAL MEDICINE

## 2019-10-15 PROCEDURE — G0008 ADMIN INFLUENZA VIRUS VAC: HCPCS

## 2019-10-15 PROCEDURE — G0463 HOSPITAL OUTPT CLINIC VISIT: HCPCS | Mod: 25

## 2019-10-15 PROCEDURE — G0463 HOSPITAL OUTPT CLINIC VISIT: HCPCS

## 2019-10-15 PROCEDURE — 36415 COLL VENOUS BLD VENIPUNCTURE: CPT | Mod: ZL | Performed by: INTERNAL MEDICINE

## 2019-10-15 PROCEDURE — 85027 COMPLETE CBC AUTOMATED: CPT | Mod: ZL | Performed by: INTERNAL MEDICINE

## 2019-10-15 PROCEDURE — 99215 OFFICE O/P EST HI 40 MIN: CPT | Performed by: INTERNAL MEDICINE

## 2019-10-15 NOTE — NURSING NOTE
The patient is here today to have a face to face for a new shower chair along with discuss medications.  Jami Cote LPN on 10/15/2019 at 1:34 PM  Chief Complaint   Patient presents with     face to face     shower chair       Initial /86 (BP Location: Left arm, Patient Position: Sitting, Cuff Size: Adult Regular)   Pulse 84   Temp 97.3  F (36.3  C) (Tympanic)   Resp 16  Estimated body mass index is 23.06 kg/m  as calculated from the following:    Height as of 3/28/18: 1.829 m (6').    Weight as of 3/28/18: 77.1 kg (170 lb).  Medication Reconciliation: complete    Jami Cote LPN

## 2019-10-15 NOTE — PROGRESS NOTES
Chief Complaint:  This patient is here for a comprehensive review of their multiple medical problems, renewal of medications and update on necessary health maintenance issues.      HPI: He is brought in today for his yearly checkup.  Basically he has been doing well.  His behaviors have been fine.  He is not having any difficulties with eating or with nausea or vomiting.  He does not have any significant bowel or bladder issues.  He has not had any significant pain.  The only real issue is that he is sometimes refusing to take his at bedtime medications, this includes his third dose of BuSpar and his Zyprexa.  Staff thinks if we change it to 6:30 PM, that he would take them just fine.    He has a history of an abnormal glucose and needs a recheck on that.  He has a history of chronic gastritis and remains stable on PPI therapy.  He has chronic constipation and has incontinence supplies as well as fiber and docusate as needed.  He does need a new shower chair, his current one is old and unstable.  He is due for lab work.  He is due for a flu shot.    Medications are reconciled.  Past medical history, past surgical history, family history and social history are reviewed and updated.    Past Medical History:   Diagnosis Date     Acute pancreatitis without infection or necrosis     Recurrent     Aortic aneurysm, intrathoracic (H)      Chronic constipation      Gastritis without bleeding     Nonsteroidal induced gastritis with GI bleed     Glaucoma      Gout     12/9/2013     Hyperglycemia      Intellectual disability     No Comments Provided     Marfan's syndrome     No Comments Provided     Other specified disorders of veins     Chronic venous stasis     Situs inversus     No Comments Provided     Urinary incontinence     No Comments Provided       Past Surgical History:   Procedure Laterality Date     CHOLECYSTECTOMY  2010       Current Outpatient Medications   Medication Sig Dispense Refill     acetaminophen  (TYLENOL) 500 MG tablet Take 2 tablets (1,000 mg) by mouth 2 times daily 100 tablet 10     allopurinol (ZYLOPRIM) 300 MG tablet Take 1 tablet (300 mg) by mouth daily In the AM. 90 tablet 3     busPIRone (BUSPAR) 5 MG tablet Take 1 tablet (5 mg) by mouth 3 times daily 90 tablet 11     docusate sodium (DOCQLACE) 100 MG capsule Take 1 capsule (100 mg) by mouth 2 times daily AM and 8PM 100 capsule 1     furosemide (LASIX) 40 MG tablet Take 2 tablets (80 mg) by mouth daily At 7  tablet 3     haloperidol (HALDOL) 5 MG tablet Prior to appointment prn 15 tablet 1     Incontinence Supply Disposable (ATTENDS UNDERWEAR 7 LARGE) [facility] ATTENDS PULL-ON LARGE ITEM #[id number] DX:N39.41 [facility]:  150 each 11     latanoprost (XALATAN) 0.005 % ophthalmic solution Place 1 drop into both eyes At Bedtime       LORazepam (ATIVAN) 2 MG tablet Prior to appointment prn 15 tablet 1     OLANZapine (ZYPREXA) 5 MG tablet TAKE ONE TABLET BY MOUTH AT 8PM 90 tablet 3     omeprazole (PRILOSEC) 20 MG DR capsule Take 1 capsule (20 mg) by mouth daily 180 capsule 3     order for DME Equipment being ordered: Shower chair 1 each 0     potassium chloride ER (K-TAB/KLOR-CON) 10 MEQ CR tablet Take 1 tablet (10 mEq) by mouth daily 90 tablet 3     psyllium 30.9 % POWD Take 1 Tablespoonful by mouth daily Mix in 8 ounces of water or orange juice. 1 Bottle 6     sennosides (SENOKOT) 8.6 MG tablet TAKE 1 TABLET BY MOUTH TWICE DAILY (AM,8PM) 180 tablet 3     tamsulosin (FLOMAX) 0.4 MG capsule Take 1 capsule (0.4 mg) by mouth daily 90 capsule 3     timolol (TIMOPTIC-XE) 0.5 % ophthalmic gel-form INSTILL 1 DROP IN BOTH EYES EVERY MORNING         No Known Allergies    History reviewed. No pertinent family history.    Social History     Socioeconomic History     Marital status: Single     Spouse name: Not on file     Number of children: Not on file     Years of education: Not on file     Highest education level: Not on file   Occupational History     Not on file    Social Needs     Financial resource strain: Not on file     Food insecurity:     Worry: Not on file     Inability: Not on file     Transportation needs:     Medical: Not on file     Non-medical: Not on file   Tobacco Use     Smoking status: Former Smoker     Packs/day: 0.00     Last attempt to quit: 1/28/2004     Years since quitting: 15.7     Smokeless tobacco: Never Used   Substance and Sexual Activity     Alcohol use: No     Drug use: No     Comment: Drug use: No     Sexual activity: Not Currently   Lifestyle     Physical activity:     Days per week: Not on file     Minutes per session: Not on file     Stress: Not on file   Relationships     Social connections:     Talks on phone: Not on file     Gets together: Not on file     Attends Spiritism service: Not on file     Active member of club or organization: Not on file     Attends meetings of clubs or organizations: Not on file     Relationship status: Not on file     Intimate partner violence:     Fear of current or ex partner: Not on file     Emotionally abused: Not on file     Physically abused: Not on file     Forced sexual activity: Not on file   Other Topics Concern     Not on file   Social History Narrative    Patient is a long term resident of the [facility].  Legal guardian is his sister, Jessica Altamirano.  Code status is DNR--therapeutic.       Review of Systems   Unable to perform ROS: Patient nonverbal       Physical Exam  Vitals signs and nursing note reviewed.   Constitutional:       General: He is not in acute distress.     Appearance: He is not ill-appearing, toxic-appearing or diaphoretic.      Comments: In wheelchair, nonfocal as usual   HENT:      Right Ear: There is impacted cerumen.      Left Ear: There is impacted cerumen.      Ears:      Comments: Bilateral ceruminosis  Eyes:      Pupils: Pupils are equal, round, and reactive to light.   Neck:      Musculoskeletal: No neck rigidity or muscular tenderness.      Vascular: No carotid bruit.    Cardiovascular:      Rate and Rhythm: Normal rate and regular rhythm.      Heart sounds: Normal heart sounds.   Pulmonary:      Effort: Pulmonary effort is normal.      Breath sounds: Normal breath sounds. No wheezing, rhonchi or rales.   Abdominal:      General: Abdomen is flat. Bowel sounds are normal. There is no distension.      Palpations: Abdomen is soft.      Tenderness: There is no tenderness.   Musculoskeletal:         General: No swelling.      Right lower leg: No edema.      Left lower leg: No edema.   Skin:     General: Skin is warm and dry.   Neurological:      Mental Status: He is alert. Mental status is at baseline.         Assessment:      ICD-10-CM    1. Marfan's syndrome Q87.40 order for DME     Comprehensive Metabolic Panel     Comprehensive Metabolic Panel   2. Behavior problem, adult F69    3. Thoracic aortic aneurysm without rupture (H) I71.2    4. Constipation, unspecified constipation type K59.00    5. Other gastritis without hemorrhage, unspecified chronicity K29.60 CBC W PLT No Diff     CBC W PLT No Diff   6. Venous (peripheral) insufficiency I87.2    7. Abnormal glucose R73.09 Hemoglobin A1c     Hemoglobin A1c   8. Mental retardation F79         Plan: In general Eliezer appears to be stable.  We will change his at bedtime medications to 6:30 PM to see if he will take those.  We could always give them at 3 PM if needed.  Staff will let me know how this goes.  All other medications will continue without change.  Complete lab drawn and pending, I will send a letter with the results.  Prescription for shower chair was provided.  Flu shot given today.  Follow-up will be as needed but otherwise he appears to be doing well.

## 2019-10-16 DIAGNOSIS — N39.41 URGE INCONTINENCE OF URINE: ICD-10-CM

## 2019-10-16 DIAGNOSIS — K59.00 CONSTIPATION: ICD-10-CM

## 2019-10-16 DIAGNOSIS — K59.00 CONSTIPATION, UNSPECIFIED CONSTIPATION TYPE: ICD-10-CM

## 2019-10-16 RX ORDER — TAMSULOSIN HYDROCHLORIDE 0.4 MG/1
0.4 CAPSULE ORAL DAILY
Qty: 90 CAPSULE | Refills: 3 | Status: SHIPPED | OUTPATIENT
Start: 2019-10-16 | End: 2020-09-16

## 2019-10-16 RX ORDER — DOCUSATE SODIUM 100 MG/1
CAPSULE, LIQUID FILLED ORAL
Qty: 100 CAPSULE | Refills: 1 | Status: SHIPPED | OUTPATIENT
Start: 2019-10-16 | End: 2020-02-13

## 2019-10-16 RX ORDER — SENNOSIDES 8.6 MG
TABLET ORAL
Qty: 180 TABLET | Refills: 3 | Status: SHIPPED | OUTPATIENT
Start: 2019-10-16 | End: 2020-09-16

## 2019-10-16 NOTE — TELEPHONE ENCOUNTER
"Refill request from  for:  sennosides (SENOKOT) 8.6 MG tablet    LOV 10/15/2019 with PCP   \"Plan: In general Eliezer appears to be stable.  We will change his at bedtime medications to 6:30 PM to see if he will take those.  We could always give them at 3 PM if needed.  Staff will let me know how this goes.  All other medications will continue without change.  Complete lab drawn and pending, I will send a letter with the results.  Prescription for shower chair was provided.  Flu shot given today.  Follow-up will be as needed but otherwise he appears to be doing well.\"    Lab results:  Patient Result Comments   Viewed by Jessica Altamirano on 10/16/2019  7:00 AM   Written by Armaan Gallego MD on 10/15/2019  3:07 PM   Eliezer Lopez's recent blood tests look fine other than his sugar being high.  Nonetheless, his diabetes test, the A1c, remains normal.  If you have any questions about his results, feel free to contact me.  I did change his evening medications and I also prescribed a new shower chair.     Sincerely,     Armaan Gallego MD   Internal Medicine     Ensured sig reads \"take 1 tablet by mouth twice daily\" to allow for adjusted evening/bedtime meds.      Requested Prescriptions   Pending Prescriptions Disp Refills     sennosides (SENOKOT) 8.6 MG tablet [Pharmacy Med Name: SENNA 8.6MG TABLET] 180 tablet 3     Sig: TAKE 1 TABLET BY MOUTH TWICE DAILY (AM,8PM)       There is no refill protocol information for this order        Florence Rodriguez RN  ....................  10/16/2019   4:27 PM      "

## 2019-10-16 NOTE — TELEPHONE ENCOUNTER
"Refill request from  for:  tamsulosin (FLOMAX) 0.4 MG capsule   MG capsule    LOV 10/15/2019 with PCP   \"Plan: In general Eliezer appears to be stable.  We will change his at bedtime medications to 6:30 PM to see if he will take those.  We could always give them at 3 PM if needed.  Staff will let me know how this goes.  All other medications will continue without change.  Complete lab drawn and pending, I will send a letter with the results.  Prescription for shower chair was provided.  Flu shot given today.  Follow-up will be as needed but otherwise he appears to be doing well\"    Lab results  Patient Result Comments   Viewed by Jessica Altamirano on 10/16/2019  7:00 AM   Written by Armaan Gallego MD on 10/15/2019  3:07 PM   Eliezer Lopez's recent blood tests look fine other than his sugar being high.  Nonetheless, his diabetes test, the A1c, remains normal.  If you have any questions about his results, feel free to contact me.  I did change his evening medications and I also prescribed a new shower chair.     Sincerely,     Armaan Gallego MD   Internal Medicine     Requested Prescriptions   Pending Prescriptions Disp Refills     tamsulosin (FLOMAX) 0.4 MG capsule [Pharmacy Med Name: TAMSULOSIN 0.4MG CAPSULE] 90 capsule 3     Sig: TAKE 1 CAPSULE (0.4 MG) BY MOUTH DAILY       Alpha Blockers Passed - 10/16/2019 12:16 PM        Passed - Blood pressure under 140/90 in past 12 months     BP Readings from Last 3 Encounters:   10/15/19 134/86   11/09/18 118/72   03/28/18 114/70           Passed - Recent (12 mo) or future (30 days) visit within the authorizing provider's specialty     Patient has had an office visit with the authorizing provider or a provider within the authorizing providers department within the previous 12 mos or has a future within next 30 days. See \"Patient Info\" tab in inbasket, or \"Choose Columns\" in Meds & Orders section of the refill encounter.            Passed - Patient does not have " "Tadalafil, Vardenafil, or Sildenafil on their medication list        Passed - Medication is active on med list        Passed - Patient is 18 years of age or older         MG capsule [Pharmacy Med Name: DOK 100MG CAPSULE] 100 capsule 1     Sig: TAKE 1 CAPSULE (100 MG) BY MOUTH 2 TIMES DAILY AM AND 8PM       Laxatives Protocol Passed - 10/16/2019 12:16 PM        Passed - Patient is age 6 or older        Passed - Recent (12 mo) or future (30 days) visit within the authorizing provider's specialty     Patient has had an office visit with the authorizing provider or a provider within the authorizing providers department within the previous 12 mos or has a future within next 30 days. See \"Patient Info\" tab in inbasket, or \"Choose Columns\" in Meds & Orders section of the refill encounter.            Passed - Medication is active on med list      Prescription refilled per RN Medication Refill Policy.................... Florence Rodriguez RN ....................  10/16/2019   4:37 PM        "

## 2019-11-18 DIAGNOSIS — M19.90 OSTEOARTHRITIS, UNSPECIFIED OSTEOARTHRITIS TYPE, UNSPECIFIED SITE: ICD-10-CM

## 2019-11-18 DIAGNOSIS — R52 PAIN: ICD-10-CM

## 2019-11-18 DIAGNOSIS — R60.0 PERIPHERAL EDEMA: ICD-10-CM

## 2019-11-19 DIAGNOSIS — Q87.40 MARFAN'S SYNDROME: ICD-10-CM

## 2019-11-19 RX ORDER — BUSPIRONE HYDROCHLORIDE 5 MG/1
5 TABLET ORAL 3 TIMES DAILY
Qty: 270 TABLET | Refills: 2 | Status: SHIPPED | OUTPATIENT
Start: 2019-11-19 | End: 2020-07-20

## 2019-11-19 NOTE — TELEPHONE ENCOUNTER
Prescription approved per [facility] Refill Protocol.  LOV: 10/15/19  Chrissie Travis RN on 11/19/2019 at 9:52 AM

## 2019-11-21 RX ORDER — PSEUDOEPHED/ACETAMINOPH/DIPHEN 30MG-500MG
TABLET ORAL
Qty: 360 TABLET | Refills: 3 | Status: SHIPPED | OUTPATIENT
Start: 2019-11-21 | End: 2020-11-18

## 2019-11-21 RX ORDER — FUROSEMIDE 40 MG
TABLET ORAL
Qty: 180 TABLET | Refills: 1 | Status: SHIPPED | OUTPATIENT
Start: 2019-11-21 | End: 2020-02-25

## 2019-11-21 NOTE — TELEPHONE ENCOUNTER
"Requested Prescriptions   Pending Prescriptions Disp Refills     furosemide (LASIX) 40 MG tablet [Pharmacy Med Name: FUROSEMIDE 40MG TABLET] 180 tablet 3     Sig: TAKE 2 TABLETS (80MG) BY MOUTH DAILY AT 7 AM       Diuretics (Including Combos) Protocol Passed - 11/18/2019  3:59 PM        Passed - Blood pressure under 140/90 in past 12 months     BP Readings from Last 3 Encounters:   10/15/19 134/86   11/09/18 118/72   03/28/18 114/70                 Passed - Recent (12 mo) or future (30 days) visit within the authorizing provider's specialty     Patient has had an office visit with the authorizing provider or a provider within the authorizing providers department within the previous 12 mos or has a future within next 30 days. See \"Patient Info\" tab in inbasket, or \"Choose Columns\" in Meds & Orders section of the refill encounter.              Passed - Medication is active on med list        Passed - Patient is age 18 or older        Passed - Normal serum creatinine on file in past 12 months     Recent Labs   Lab Test 10/15/19  1400   CR 0.87              Passed - Normal serum potassium on file in past 12 months     Recent Labs   Lab Test 10/15/19  1400   POTASSIUM 3.7                    Passed - Normal serum sodium on file in past 12 months     Recent Labs   Lab Test 10/15/19  1400                 ACETAMINOPHEN EXTRA STRENGTH 500 MG tablet [Pharmacy Med Name: ACETAMINOPHEN ES 500MG CAPLET]       Sig: TAKE 2 TABLETS (1,000 MG) BY MOUTH TWICE DAILY       Analgesics (Non-Narcotic Tylenol and ASA Only) Passed - 11/18/2019  3:59 PM        Passed - Recent (12 mo) or future (30 days) visit within the authorizing provider's specialty     Patient has had an office visit with the authorizing provider or a provider within the authorizing providers department within the previous 12 mos or has a future within next 30 days. See \"Patient Info\" tab in inbasket, or \"Choose Columns\" in Meds & Orders section of the refill " "encounter.              Passed - Patient is 7 months old or older     If patient is a peds patient of the age 7 mos -12 years, ok to refill using weight-based dosing.     If >3g daily and/or sig is not \"prn\", check for liver enzymes. If normal in the last year, ok to refill.  If not, refer to the provider.          Passed - Medication is active on med list        lov 10/15/19  Prescription approved per [facility] Refill Protocol.    "

## 2019-12-05 ENCOUNTER — OFFICE VISIT (OUTPATIENT)
Dept: FAMILY MEDICINE | Facility: OTHER | Age: 74
End: 2019-12-05
Attending: NURSE PRACTITIONER
Payer: COMMERCIAL

## 2019-12-05 DIAGNOSIS — S90.129A BRUISED TOE: Primary | ICD-10-CM

## 2019-12-05 PROCEDURE — G0463 HOSPITAL OUTPT CLINIC VISIT: HCPCS

## 2019-12-05 PROCEDURE — 99213 OFFICE O/P EST LOW 20 MIN: CPT | Performed by: NURSE PRACTITIONER

## 2019-12-05 ASSESSMENT — ANXIETY QUESTIONNAIRES
3. WORRYING TOO MUCH ABOUT DIFFERENT THINGS: NOT AT ALL
4. TROUBLE RELAXING: NOT AT ALL
GAD7 TOTAL SCORE: 0
1. FEELING NERVOUS, ANXIOUS, OR ON EDGE: NOT AT ALL
5. BEING SO RESTLESS THAT IT IS HARD TO SIT STILL: NOT AT ALL
2. NOT BEING ABLE TO STOP OR CONTROL WORRYING: NOT AT ALL
7. FEELING AFRAID AS IF SOMETHING AWFUL MIGHT HAPPEN: NOT AT ALL
6. BECOMING EASILY ANNOYED OR IRRITABLE: NOT AT ALL

## 2019-12-05 ASSESSMENT — PATIENT HEALTH QUESTIONNAIRE - PHQ9: SUM OF ALL RESPONSES TO PHQ QUESTIONS 1-9: 0

## 2019-12-05 NOTE — NURSING NOTE
Patient presents to the clinic for possible left infected toenail.  Medication Reconciliation Completed.    Yrn Sawant LPN  12/5/2019 10:46 AM

## 2019-12-05 NOTE — PROGRESS NOTES
HPI:    Dhiraj De La Cruz is a 74 year old male who presents to clinic today with caregiver for toenail concerns.  Staff reports they noticed just under a week ago that his left third toe had a little bit of color change to it just around the nailbed.  They report this is a reddish-purple in color.  Does not seem to be bothering him.  He has not had any drainage from the area.  He does have very thickened nail here and they wonder if may be his sock caught this and caused an infection.    Past Medical History:   Diagnosis Date     Acute pancreatitis without infection or necrosis     Recurrent     Aortic aneurysm, intrathoracic (H)      Chronic constipation      Gastritis without bleeding     Nonsteroidal induced gastritis with GI bleed     Glaucoma      Gout     12/9/2013     Hyperglycemia      Intellectual disability     No Comments Provided     Marfan's syndrome     No Comments Provided     Other specified disorders of veins     Chronic venous stasis     Situs inversus     No Comments Provided     Urinary incontinence     No Comments Provided         Current Outpatient Medications   Medication Sig Dispense Refill     ACETAMINOPHEN EXTRA STRENGTH 500 MG tablet TAKE 2 TABLETS (1,000 MG) BY MOUTH TWICE DAILY 360 tablet 3     allopurinol (ZYLOPRIM) 300 MG tablet Take 1 tablet (300 mg) by mouth daily In the AM. 90 tablet 3     busPIRone (BUSPAR) 5 MG tablet Take 1 tablet (5 mg) by mouth 3 times daily 270 tablet 2     docusate sodium (DOK) 100 MG capsule Take 1 capsule (100 mg) by mouth 2 times daily. 100 capsule 1     furosemide (LASIX) 40 MG tablet TAKE 2 TABLETS (80MG) BY MOUTH DAILY AT 7  tablet 1     haloperidol (HALDOL) 5 MG tablet Prior to appointment prn 15 tablet 1     Incontinence Supply Disposable (ATTENDS UNDERWEAR 7 LARGE) MISC ATTENDS PULL-ON LARGE ITEM #[id number] DX:[id number] [facility]:  150 each 11     latanoprost (XALATAN) 0.005 % ophthalmic solution Place 1 drop into both eyes At Bedtime       LORazepam  (ATIVAN) 2 MG tablet Prior to appointment prn 15 tablet 1     OLANZapine (ZYPREXA) 5 MG tablet TAKE ONE TABLET BY MOUTH AT 8PM 90 tablet 3     omeprazole (PRILOSEC) 20 MG DR capsule Take 1 capsule (20 mg) by mouth daily 180 capsule 3     order for DME Equipment being ordered: Shower chair 1 each 0     potassium chloride ER (K-TAB/KLOR-CON) 10 MEQ CR tablet Take 1 tablet (10 mEq) by mouth daily 90 tablet 3     psyllium 30.9 % POWD Take 1 Tablespoonful by mouth daily Mix in 8 ounces of water or orange juice. 1 Bottle 6     sennosides (SENOKOT) 8.6 MG tablet Take 1 tablet by mouth twice daily. 180 tablet 3     tamsulosin (FLOMAX) 0.4 MG capsule TAKE 1 CAPSULE (0.4 MG) BY MOUTH DAILY 90 capsule 3     timolol (TIMOPTIC-XE) 0.5 % ophthalmic gel-form INSTILL 1 DROP IN BOTH EYES EVERY MORNING         No Known Allergies    ROS:  Unable to complete review of systems due to being nonverbal.    EXAM:  There were no vitals taken for this visit.  General appearance: well appearing male, in no acute distress, noncompliant with majority of exam  Dermatological: Left third toe with dark reddish-purple bruising appreciated around nailbed  Psychological: normal affect, alert and pleasant    ASSESSMENT AND PLAN:    1. Bruised toe      Bruise to left third toe appreciated on exam.  Recommend open toed compression socks until this resolves to avoid further pressure or trauma.  Staff to monitor for signs of infection including warmth, swelling or drainage.  Follow-up as needed.      SERGIO Reyes CNP..................12/5/2019 10:49 AM      This document was prepared using voice generated software.  While every attempt was made for accuracy, grammatical errors may exist.

## 2019-12-06 ASSESSMENT — ANXIETY QUESTIONNAIRES: GAD7 TOTAL SCORE: 0

## 2019-12-12 DIAGNOSIS — F69 BEHAVIOR PROBLEM, ADULT: ICD-10-CM

## 2019-12-12 RX ORDER — HALOPERIDOL 5 MG/1
TABLET ORAL
Qty: 15 TABLET | Refills: 1 | Status: SHIPPED | OUTPATIENT
Start: 2019-12-12 | End: 2022-10-12

## 2019-12-12 NOTE — TELEPHONE ENCOUNTER
Disp Refills Start End KENNY   haloperidol (HALDOL) 5 MG tablet 15 tablet 1 11/20/2018  No   Sig: Prior to appointment prn       LOV: 10/15/2019  Future Office visit:  No future appointment scheduled at this time.     Routing refill request to provider for review/approval because:  Drug not on the [facility] refill protocol or controlled substance    Requested Prescriptions   Pending Prescriptions Disp Refills     haloperidol (HALDOL) 5 MG tablet 15 tablet 1     Sig: Prior to appointment prn       There is no refill protocol information for this order          Unable to complete prescription refill per RN Medication Refill Policy.................... Jannette Rodrgíuez RN ....................  12/12/2019   9:16 AM

## 2019-12-16 DIAGNOSIS — K29.60 OTHER GASTRITIS WITHOUT HEMORRHAGE, UNSPECIFIED CHRONICITY: ICD-10-CM

## 2019-12-17 NOTE — TELEPHONE ENCOUNTER
"Requested Prescriptions   Pending Prescriptions Disp Refills     omeprazole (PRILOSEC) 20 MG DR capsule [Pharmacy Med Name: OMEPRAZOLE 20MG DR CAPSULE] 180 capsule 3     Sig: TAKE 1 CAPSULE BY MOUTH DAILY       PPI Protocol Passed - 12/16/2019  1:36 PM        Passed - Not on Clopidogrel (unless Pantoprazole ordered)        Passed - No diagnosis of osteoporosis on record        Passed - Recent (12 mo) or future (30 days) visit within the authorizing provider's specialty     Patient has had an office visit with the authorizing provider or a provider within the authorizing providers department within the previous 12 mos or has a future within next 30 days. See \"Patient Info\" tab in inbasket, or \"Choose Columns\" in Meds & Orders section of the refill encounter.          Passed - Medication is active on med list        Passed - Patient is age 18 or older      LOV-12/5/2019 with no noted changes in this medication. Prescription refilled per RN Medication RefillPolestrellitay.................... Carolee Fontanez RN ....................  12/17/2019   11:18 AM        "

## 2020-01-17 ENCOUNTER — MYC MEDICAL ADVICE (OUTPATIENT)
Dept: INTERNAL MEDICINE | Facility: OTHER | Age: 75
End: 2020-01-17

## 2020-01-17 ENCOUNTER — OFFICE VISIT (OUTPATIENT)
Dept: INTERNAL MEDICINE | Facility: OTHER | Age: 75
End: 2020-01-17
Attending: INTERNAL MEDICINE
Payer: COMMERCIAL

## 2020-01-17 VITALS
TEMPERATURE: 98 F | RESPIRATION RATE: 20 BRPM | SYSTOLIC BLOOD PRESSURE: 120 MMHG | HEART RATE: 88 BPM | DIASTOLIC BLOOD PRESSURE: 80 MMHG

## 2020-01-17 DIAGNOSIS — B35.1 ONYCHOMYCOSIS: Primary | ICD-10-CM

## 2020-01-17 DIAGNOSIS — I87.2 VENOUS (PERIPHERAL) INSUFFICIENCY: ICD-10-CM

## 2020-01-17 DIAGNOSIS — R32 URINARY INCONTINENCE, UNSPECIFIED TYPE: ICD-10-CM

## 2020-01-17 PROCEDURE — 99214 OFFICE O/P EST MOD 30 MIN: CPT | Performed by: INTERNAL MEDICINE

## 2020-01-17 PROCEDURE — G0463 HOSPITAL OUTPT CLINIC VISIT: HCPCS

## 2020-01-17 ASSESSMENT — PAIN SCALES - GENERAL: PAINLEVEL: NO PAIN (0)

## 2020-01-17 NOTE — TELEPHONE ENCOUNTER
I am not sure that Dr. Everett does toenails and I am not sure who the patient got set up to see for podiatry.

## 2020-01-17 NOTE — PROGRESS NOTES
Chief Complaint: Foot problem and urinary difficulties.    HPI: This patient is here today for a follow-up on his toe.  See previous note.  He has a history of onychomycosis and has had significant problems with his toenails.  He now has difficulties with his left third toenail.  It is thickened and pointed upwards.  It is not clear that he is having any pain with this but the toe appears to be irritated from this and the staff is concerned that this is a potential problem and will get hung up on his stockings or possibly infected, etc.  His sister is wondering about having him see a podiatrist to have this removed.  Eliezer is of course very protective of his toenails and does not typically allow anything be done with them and would need to have sedation done to have this toenail treated.    He is also having more difficulties with incontinence of urine.  He has been wearing incontinence underwear through the night but it now appears that he needs to wear them through the day as well.  He is having a number of episodes with urinary incontinence.  This is just progressing with his age and decline in function.    Staff also had questions about whether or not to continue with support stockings.  He does have a previous history of significant difficulties with lower extremity ulcerations and was in Unna boots for a long period of time.  Current care seem to be preventing any further problems so I would recommend that he continue with support stockings.    Past Medical History:   Diagnosis Date     Acute pancreatitis without infection or necrosis     Recurrent     Aortic aneurysm, intrathoracic (H)      Chronic constipation      Gastritis without bleeding     Nonsteroidal induced gastritis with GI bleed     Glaucoma      Gout     12/9/2013     Hyperglycemia      Intellectual disability     No Comments Provided     Marfan's syndrome     No Comments Provided     Other specified disorders of veins     Chronic venous stasis      Situs inversus     No Comments Provided     Urinary incontinence     No Comments Provided       Past Surgical History:   Procedure Laterality Date     CHOLECYSTECTOMY  2010       No Known Allergies    Current Outpatient Medications   Medication Sig Dispense Refill     ACETAMINOPHEN EXTRA STRENGTH 500 MG tablet TAKE 2 TABLETS (1,000 MG) BY MOUTH TWICE DAILY 360 tablet 3     allopurinol (ZYLOPRIM) 300 MG tablet Take 1 tablet (300 mg) by mouth daily In the AM. 90 tablet 3     busPIRone (BUSPAR) 5 MG tablet Take 1 tablet (5 mg) by mouth 3 times daily 270 tablet 2     docusate sodium (DOK) 100 MG capsule Take 1 capsule (100 mg) by mouth 2 times daily. 100 capsule 1     furosemide (LASIX) 40 MG tablet TAKE 2 TABLETS (80MG) BY MOUTH DAILY AT 7  tablet 1     haloperidol (HALDOL) 5 MG tablet Prior to appointment prn 15 tablet 1     Incontinence Supply Disposable (ATTENDS UNDERWEAR 7 LARGE) [facility] ATTENDS PULL-ON LARGE ITEM #[id number] DX:[phone] [facility]:  150 each 11     latanoprost (XALATAN) 0.005 % ophthalmic solution Place 1 drop into both eyes At Bedtime       LORazepam (ATIVAN) 2 MG tablet Prior to appointment prn 15 tablet 1     OLANZapine (ZYPREXA) 5 MG tablet TAKE ONE TABLET BY MOUTH AT 8PM 90 tablet 3     omeprazole (PRILOSEC) 20 MG DR capsule TAKE 1 CAPSULE BY MOUTH DAILY 180 capsule 3     potassium chloride ER (K-TAB/KLOR-CON) 10 MEQ CR tablet Take 1 tablet (10 mEq) by mouth daily 90 tablet 3     psyllium 30.9 % POWD Take 1 Tablespoonful by mouth daily Mix in 8 ounces of water or orange juice. 1 Bottle 6     sennosides (SENOKOT) 8.6 MG tablet Take 1 tablet by mouth twice daily. 180 tablet 3     tamsulosin (FLOMAX) 0.4 MG capsule TAKE 1 CAPSULE (0.4 MG) BY MOUTH DAILY 90 capsule 3     timolol (TIMOPTIC-XE) 0.5 % ophthalmic gel-form INSTILL 1 DROP IN BOTH EYES EVERY MORNING         Social History     Socioeconomic History     Marital status: Single     Spouse name: Not on file     Number of children: Not on  file     Years of education: Not on file     Highest education level: Not on file   Occupational History     Not on file   Social Needs     Financial resource strain: Not on file     Food insecurity:     Worry: Not on file     Inability: Not on file     Transportation needs:     Medical: Not on file     Non-medical: Not on file   Tobacco Use     Smoking status: Former Smoker     Packs/day: 0.00     Last attempt to quit: 1/28/2004     Years since quitting: 15.9     Smokeless tobacco: Never Used   Substance and Sexual Activity     Alcohol use: No     Drug use: Never     Sexual activity: Not Currently   Lifestyle     Physical activity:     Days per week: Not on file     Minutes per session: Not on file     Stress: Not on file   Relationships     Social connections:     Talks on phone: Not on file     Gets together: Not on file     Attends Gnosticist service: Not on file     Active member of club or organization: Not on file     Attends meetings of clubs or organizations: Not on file     Relationship status: Not on file     Intimate partner violence:     Fear of current or ex partner: Not on file     Emotionally abused: Not on file     Physically abused: Not on file     Forced sexual activity: Not on file   Other Topics Concern     Not on file   Social History Narrative    Patient is a long term resident of the [facility].  Legal guardian is his sister, Jessica Altamirano.  Code status is DNR--therapeutic.       Review of Systems   Unable to perform ROS: Patient nonverbal       Physical Exam  Vitals signs and nursing note reviewed.   Constitutional:       General: He is not in acute distress.     Appearance: He is not ill-appearing, toxic-appearing or diaphoretic.   Musculoskeletal:      Comments: His third toenail shows significant onychomycosis with thickening of the nail and upward deviation of the toenail.  The surrounding toe appeared to be a little bit purplish, likely bruised and irritated.  No open sores.  No  peripheral edema.  No lower extremity ulcers.   Skin:     General: Skin is warm and dry.   Neurological:      Mental Status: He is alert. Mental status is at baseline.         Assessment:      ICD-10-CM    1. Onychomycosis B35.1 PODIATRY/FOOT & ANKLE SURGERY REFERRAL   2. Urinary incontinence, unspecified type R32    3. Venous (peripheral) insufficiency I87.2        Plan: It seems that this toenail would be best served being removed before it causes problems.  Consultation with podiatry is requested.  For his incontinence, he will start wearing depends 24 hours a day.  I do not think that any further intervention or evaluation is necessary for this.  Continue wearing venous support stockings as well.

## 2020-01-17 NOTE — NURSING NOTE
Patient comes in for follow up on left foot middle toenail.  Perri Gonzalez LPN ....................1/17/2020   8:48 AM  Chief Complaint   Patient presents with     Follow Up     left foot middle toenail bothering       Initial /80 (BP Location: Right arm, Patient Position: Sitting, Cuff Size: Adult Regular)   Pulse 88   Temp 98  F (36.7  C) (Tympanic)   Resp 20  Estimated body mass index is 23.06 kg/m  as calculated from the following:    Height as of 3/28/18: 1.829 m (6').    Weight as of 3/28/18: 77.1 kg (170 lb).  Medication Reconciliation: complete    Perri Gonzalez LPN

## 2020-01-20 NOTE — TELEPHONE ENCOUNTER
Contacted the patients EC she stated that they will keep the referral to [facility] in [place] for the podiatrist.  Jami Cote LPN on 1/20/2020 at 10:40 AM

## 2020-02-05 ENCOUNTER — TELEPHONE (OUTPATIENT)
Dept: INTERNAL MEDICINE | Facility: OTHER | Age: 75
End: 2020-02-05

## 2020-02-05 NOTE — TELEPHONE ENCOUNTER
Patient has always received compression so[facility] from [facility]  [facility] will fax over order sheet.  Perri Gonzalez LPN ....................2/5/2020   4:03 PM'

## 2020-02-06 DIAGNOSIS — F69 BEHAVIOR PROBLEM, ADULT: ICD-10-CM

## 2020-02-06 NOTE — TELEPHONE ENCOUNTER
Routing refill request to provider for review/approval because:  Drug not on the [facility] refill protocol     LOV: 1/17/2020    Chrissie Travis RN on 2/6/2020 at 12:53 PM

## 2020-02-07 RX ORDER — LORAZEPAM 2 MG/1
TABLET ORAL
Qty: 15 TABLET | Refills: 1 | Status: SHIPPED | OUTPATIENT
Start: 2020-02-07 | End: 2020-11-19

## 2020-02-11 ENCOUNTER — OFFICE VISIT (OUTPATIENT)
Dept: PODIATRY | Facility: OTHER | Age: 75
End: 2020-02-11
Attending: INTERNAL MEDICINE
Payer: COMMERCIAL

## 2020-02-11 VITALS — SYSTOLIC BLOOD PRESSURE: 120 MMHG | DIASTOLIC BLOOD PRESSURE: 70 MMHG

## 2020-02-11 DIAGNOSIS — Q87.40 MARFAN'S SYNDROME: ICD-10-CM

## 2020-02-11 DIAGNOSIS — L60.3 ONYCHODYSTROPHY: Primary | ICD-10-CM

## 2020-02-11 DIAGNOSIS — F69 BEHAVIOR PROBLEM, ADULT: ICD-10-CM

## 2020-02-11 DIAGNOSIS — Q72.813: ICD-10-CM

## 2020-02-11 DIAGNOSIS — F79 INTELLECTUAL DISABILITY: ICD-10-CM

## 2020-02-11 PROCEDURE — 99203 OFFICE O/P NEW LOW 30 MIN: CPT | Performed by: PODIATRIST

## 2020-02-11 PROCEDURE — G0463 HOSPITAL OUTPT CLINIC VISIT: HCPCS

## 2020-02-11 NOTE — PROGRESS NOTES
Chief complaint: Patient presents with:  Toenail      History of Present Illness: This 75 year old male with Marfan's syndrome, mental retardation, gout, and venous insufficiency is seen with his caretaker at the request of Lashonda for evaluation and suggestions of management of a thickened toenail on the LEFT 3rd digit. The toenail has been thick and discolored for a long time, but more recently the nail has seemed to start to grow upward instead of outward. The nail does not seem to cause discomfort to the patient and he allows his caretakers to pat his feet dry after a shower with no pain expressed. The caretakers wanted to make sure the toenail was not an ingrown toenail or that they weren't causing harm to the patient.    Patient is non-verbal. His caretakers state he usually gives a thumbs up when he is agreeable to a question (for instance, if he is asked if it is okay to remove his socks and shoes, he will give a thumbs up if he is agreeable to this). He otherwise has high anxiety and will start yelling if he is not agreeable to a request.    Patient had his bilateral hallux toenails removed over seven years ago, but the caretaker says it was before she worked with the patient so she is unsure where this was done, who removed the nails or how this was done (in the clinic vs. Operating room, etc.).    The patient's caretaker says it is challenging to trim the patient's toenails at the care facility, so if the LEFT 3rd digit toenail needs to be removed at some point, she is wondering if all of the toenails can be removed at the same time so they do not have have the patient anxious and upset every tie he needs a nail debridement.    No further pedal complaints today.       /70 (BP Location: Left arm, Patient Position: Sitting, Cuff Size: Adult Regular)     Patient Active Problem List   Diagnosis     ACP (advance care planning)     Aortic aneurysm (H)     Constipation     Abnormal glucose     Edema      Gastritis     Glaucoma     Gout     Inguinal hernia, left     Marfan's syndrome     Mental retardation     Situs inversus     Venous (peripheral) insufficiency     Urinary incontinence     Behavior problem, adult       Past Surgical History:   Procedure Laterality Date     CHOLECYSTECTOMY  2010       Current Outpatient Medications   Medication     ACETAMINOPHEN EXTRA STRENGTH 500 MG tablet     allopurinol (ZYLOPRIM) 300 MG tablet     busPIRone (BUSPAR) 5 MG tablet     docusate sodium (DOK) 100 MG capsule     furosemide (LASIX) 40 MG tablet     haloperidol (HALDOL) 5 MG tablet     Incontinence Supply Disposable (ATTENDS UNDERWEAR 7 LARGE) MISC     latanoprost (XALATAN) 0.005 % ophthalmic solution     LORazepam (ATIVAN) 2 MG tablet     OLANZapine (ZYPREXA) 5 MG tablet     omeprazole (PRILOSEC) 20 MG DR capsule     potassium chloride ER (K-TAB/KLOR-CON) 10 MEQ CR tablet     psyllium 30.9 % POWD     sennosides (SENOKOT) 8.6 MG tablet     tamsulosin (FLOMAX) 0.4 MG capsule     timolol (TIMOPTIC-XE) 0.5 % ophthalmic gel-form     No current facility-administered medications for this visit.         No Known Allergies    No family history on file.    Social History     Socioeconomic History     Marital status: Single     Spouse name: Not on file     Number of children: Not on file     Years of education: Not on file     Highest education level: Not on file   Occupational History     Not on file   Social Needs     Financial resource strain: Not on file     Food insecurity:     Worry: Not on file     Inability: Not on file     Transportation needs:     Medical: Not on file     Non-medical: Not on file   Tobacco Use     Smoking status: Former Smoker     Packs/day: 0.00     Last attempt to quit: 2004     Years since quittin.0     Smokeless tobacco: Never Used   Substance and Sexual Activity     Alcohol use: No     Drug use: Never     Sexual activity: Not Currently   Lifestyle     Physical activity:     Days per week:  Not on file     Minutes per session: Not on file     Stress: Not on file   Relationships     Social connections:     Talks on phone: Not on file     Gets together: Not on file     Attends Congregational service: Not on file     Active member of club or organization: Not on file     Attends meetings of clubs or organizations: Not on file     Relationship status: Not on file     Intimate partner violence:     Fear of current or ex partner: Not on file     Emotionally abused: Not on file     Physically abused: Not on file     Forced sexual activity: Not on file   Other Topics Concern     Not on file   Social History Narrative    Patient is a long term resident of the [facility].  Legal guardian is his sister, Jessica Altamirano.  Code status is DNR--therapeutic.       ROS: 10 point ROS neg other than the symptoms noted above in the HPI.  EXAM  Constitutional: healthy, alert and no distress    Psychiatric: mentation appears normal and affect normal/bright    VASCULAR:  -Unable to palpate a DP or PT pulse bilaterally, but patient would not tolerate more than a short touch to the foot to locate a pulse  -Hair growth Absent to b/l anterior legs and ankles  NEURO:  -Unable to evaluate -- patient would not tolerate more than a short touch to the foot and he is non-verbal  DERM:  -Skin cool in temperature bilaterally   -Skin mildly erythematous to the bilateral foot and leg  -Skin thin, shiny, and atrophic  -Toenails elongated, thickened, dystrophic and discolored x 8  ---Bilateral hallux toenails are removed   -LEFT 3rd digit toenail thickened and growing dorsally instead of distaly  ---No incurvation to the borders  ---No drainage  ---No severe erythema, no ascending erythema, no calor, no purulence, no malodor, no other SOI.   MSK:  -Unable to palpate LEFT 3rd digit  -Bilateral 4th digit moderately shortened and dorsiflexed between the base of the 3rd and 5th digits.  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 Treatment options discussed with no educational barriers noted.  -Patient was very agitated and would start yelling every time his foot was touched. He did eventually allow temporary (~3 seconds) of touching to the top of the foot, but he would not allow a second touch to the foot and he became angry and started yelling and moving his feet when asking the patient if he would allow his nail to be trimmed or for his foot simply to be touched. Unable to do any further physical exam or treatment today.  -The patient's toenail is thick, but it is not currently irritating the surrounding skin, it is not infected, and the caretaker said the nail does not seem to be causing him pain.   --Patient was instructed to look for SOI (redness, swelling, pain, purulence, fever, chills, nausea, vomiting) and to return to podiatry or the emergency department immediately if there are any SOI.   ---If nail becomes infected, go to the ED for treatment of the infection  ---If the nail starts causing pain or infection for the patient, would consider a nail matrixectomy in the operating room under light sedation if a nail removal was required.  ---I do not recommend removing every toenail at the same time since this would require a lot of after care and dressing changes and would place him at higher risk of infection and would be more painful. The other nails (and thickened toenail) are not currently causing the patient pain or discomfort and are not causing wounds or infections.  -Patient in agreement with the above treatment plan and all of patient's questions were  answered.      RTC as needed        Linda Gil DPM

## 2020-02-11 NOTE — LETTER
2/11/2020         RE: Dhiraj De La Cruz  [address] [facility] [address]        Dear Colleague,    Thank you for referring your patient, Dhiraj De La Cruz, to the [facility]. Please see a copy of my visit note below.    Chief complaint: Patient presents with:  Toenail      History of Present Illness: This 75 year old male with Marfan's syndrome, mental retardation, gout, and venous insufficiency is seen with his caretaker at the request of Lashonda for evaluation and suggestions of management of a thickened toenail on the LEFT 3rd digit. The toenail has been thick and discolored for a long time, but more recently the nail has seemed to start to grow upward instead of outward. The nail does not seem to cause discomfort to the patient and he allows his caretakers to pat his feet dry after a shower with no pain expressed. The caretakers wanted to make sure the toenail was not an ingrown toenail or that they weren't causing harm to the patient.    Patient is non-verbal. His caretakers state he usually gives a thumbs up when he is agreeable to a question (for instance, if he is asked if it is okay to remove his socks and shoes, he will give a thumbs up if he is agreeable to this). He otherwise has high anxiety and will start yelling if he is not agreeable to a request.    Patient had his bilateral hallux toenails removed over seven years ago, but the caretaker says it was before she worked with the patient so she is unsure where this was done, who removed the nails or how this was done (in the clinic vs. Operating room, etc.).    The patient's caretaker says it is challenging to trim the patient's toenails at the care facility, so if the LEFT 3rd digit toenail needs to be removed at some point, she is wondering if all of the toenails can be removed at the same time so they do not have have the patient anxious and upset every tie he needs a nail debridement.    No further pedal complaints today.       BP  120/70 (BP Location: Left arm, Patient Position: Sitting, Cuff Size: Adult Regular)     Patient Active Problem List   Diagnosis     ACP (advance care planning)     Aortic aneurysm (H)     Constipation     Abnormal glucose     Edema     Gastritis     Glaucoma     Gout     Inguinal hernia, left     Marfan's syndrome     Mental retardation     Situs inversus     Venous (peripheral) insufficiency     Urinary incontinence     Behavior problem, adult       Past Surgical History:   Procedure Laterality Date     CHOLECYSTECTOMY  2010       Current Outpatient Medications   Medication     ACETAMINOPHEN EXTRA STRENGTH 500 MG tablet     allopurinol (ZYLOPRIM) 300 MG tablet     busPIRone (BUSPAR) 5 MG tablet     docusate sodium (DOK) 100 MG capsule     furosemide (LASIX) 40 MG tablet     haloperidol (HALDOL) 5 MG tablet     Incontinence Supply Disposable (ATTENDS UNDERWEAR 7 LARGE) MISC     latanoprost (XALATAN) 0.005 % ophthalmic solution     LORazepam (ATIVAN) 2 MG tablet     OLANZapine (ZYPREXA) 5 MG tablet     omeprazole (PRILOSEC) 20 MG DR capsule     potassium chloride ER (K-TAB/KLOR-CON) 10 MEQ CR tablet     psyllium 30.9 % POWD     sennosides (SENOKOT) 8.6 MG tablet     tamsulosin (FLOMAX) 0.4 MG capsule     timolol (TIMOPTIC-XE) 0.5 % ophthalmic gel-form     No current facility-administered medications for this visit.         No Known Allergies    No family history on file.    Social History     Socioeconomic History     Marital status: Single     Spouse name: Not on file     Number of children: Not on file     Years of education: Not on file     Highest education level: Not on file   Occupational History     Not on file   Social Needs     Financial resource strain: Not on file     Food insecurity:     Worry: Not on file     Inability: Not on file     Transportation needs:     Medical: Not on file     Non-medical: Not on file   Tobacco Use     Smoking status: Former Smoker     Packs/day: 0.00     Last attempt to quit:  2004     Years since quittin.0     Smokeless tobacco: Never Used   Substance and Sexual Activity     Alcohol use: No     Drug use: Never     Sexual activity: Not Currently   Lifestyle     Physical activity:     Days per week: Not on file     Minutes per session: Not on file     Stress: Not on file   Relationships     Social connections:     Talks on phone: Not on file     Gets together: Not on file     Attends Islam service: Not on file     Active member of club or organization: Not on file     Attends meetings of clubs or organizations: Not on file     Relationship status: Not on file     Intimate partner violence:     Fear of current or ex partner: Not on file     Emotionally abused: Not on file     Physically abused: Not on file     Forced sexual activity: Not on file   Other Topics Concern     Not on file   Social History Narrative    Patient is a long term resident of the [facility].  Legal guardian is his sister, Jessica Altamirano.  Code status is DNR--therapeutic.       ROS: 10 point ROS neg other than the symptoms noted above in the HPI.  EXAM  Constitutional: healthy, alert and no distress    Psychiatric: mentation appears normal and affect normal/bright    VASCULAR:  -Unable to palpate a DP or PT pulse bilaterally, but patient would not tolerate more than a short touch to the foot to locate a pulse  -Hair growth Absent to b/l anterior legs and ankles  NEURO:  -Unable to evaluate -- patient would not tolerate more than a short touch to the foot and he is non-verbal  DERM:  -Skin cool in temperature bilaterally   -Skin mildly erythematous to the bilateral foot and leg  -Skin thin, shiny, and atrophic  -Toenails elongated, thickened, dystrophic and discolored x 8  ---Bilateral hallux toenails are removed   -LEFT 3rd digit toenail thickened and growing dorsally instead of distaly  ---No incurvation to the borders  ---No drainage  ---No severe erythema, no ascending erythema, no calor, no  purulence, no malodor, no other SOI.   MSK:  -Unable to palpate LEFT 3rd digit  -Bilateral 4th digit moderately shortened and dorsiflexed between the base of the 3rd and 5th digits.  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 Treatment options discussed with no educational barriers noted.  -Patient was very agitated and would start yelling every time his foot was touched. He did eventually allow temporary (~3 seconds) of touching to the top of the foot, but he would not allow a second touch to the foot and he became angry and started yelling and moving his feet when asking the patient if he would allow his nail to be trimmed or for his foot simply to be touched. Unable to do any further physical exam or treatment today.  -The patient's toenail is thick, but it is not currently irritating the surrounding skin, it is not infected, and the caretaker said the nail does not seem to be causing him pain.   --Patient was instructed to look for SOI (redness, swelling, pain, purulence, fever, chills, nausea, vomiting) and to return to podiatry or the emergency department immediately if there are any SOI.   ---If nail becomes infected, go to the ED for treatment of the infection  ---If the nail starts causing pain or infection for the patient, would consider a nail matrixectomy in the operating room under light sedation if a nail removal was required.  ---I do not recommend removing every toenail at the same time since this would require a lot of after care and dressing changes and would place him at higher risk of infection and would be more  painful. The other nails (and thickened toenail) are not currently causing the patient pain or discomfort and are not causing wounds or infections.  -Patient in agreement with the above treatment plan and all of patient's questions were answered.      RTC as needed        Linda Gil DPM    Again, thank you for allowing me to participate in the care of your patient.        Sincerely,        Linda Gil DPM

## 2020-02-11 NOTE — NURSING NOTE
Chief Complaint   Patient presents with     Toenail       Initial /70 (BP Location: Left arm, Patient Position: Sitting, Cuff Size: Adult Regular)  Estimated body mass index is 23.06 kg/m  as calculated from the following:    Height as of 3/28/18: 1.829 m (6').    Weight as of 3/28/18: 77.1 kg (170 lb).  Medication Reconciliation: complete  Henrietta Smith LPN

## 2020-02-13 DIAGNOSIS — K59.00 CONSTIPATION: ICD-10-CM

## 2020-02-13 RX ORDER — DOCUSATE SODIUM 100 MG/1
CAPSULE, LIQUID FILLED ORAL
Qty: 100 CAPSULE | Refills: 1 | Status: SHIPPED | OUTPATIENT
Start: 2020-02-13 | End: 2020-05-15

## 2020-02-13 NOTE — TELEPHONE ENCOUNTER
[id number] sent Rx request for the following:      DOK 100MG CAPSULE  Sig: TAKE 1 CAPSULE (100 MG) BY MOUTH 2 TIMES DAILY    Last Prescription Date:   10/16/2019  Last Fill Qty/Refills:         100, R-1    Last Office Visit:              1/17/2020  Future Office visit:           none        Prescription refilled per RN Medication Refill Policy.................... Franco Cole RN ....................  2/13/2020   10:47 AM

## 2020-02-19 ENCOUNTER — MYC MEDICAL ADVICE (OUTPATIENT)
Dept: INTERNAL MEDICINE | Facility: OTHER | Age: 75
End: 2020-02-19

## 2020-02-19 NOTE — TELEPHONE ENCOUNTER
Notify his facility that I want them to give his furosemide only on Tuesday, Thursday Saturday and Sunday.

## 2020-02-19 NOTE — TELEPHONE ENCOUNTER
Let his sister know that we are going to just hold the water pill on the days that he has his day program and see how that works.

## 2020-02-20 ENCOUNTER — MYC MEDICAL ADVICE (OUTPATIENT)
Dept: INTERNAL MEDICINE | Facility: OTHER | Age: 75
End: 2020-02-20

## 2020-02-20 DIAGNOSIS — R60.0 PERIPHERAL EDEMA: ICD-10-CM

## 2020-02-20 NOTE — TELEPHONE ENCOUNTER
Called sister and verified name and date of birth. In formed her of the note from yesterday to take the diuretic on T, TH, S&S per MAF. She requests I call [facility] and clarify order with them.  Maribel Hall LPN on 2/20/2020 at 2:37 PM       Called Maryanne at [facility] and clarified order with her for the Lasix. They are requesting a new order be faxed over for the administration on Tuesday, Thursday, Saturday and Sunday.   The direct fax number [phone]  Maribel Hall LPN on 2/20/2020 at 2:55 PM

## 2020-02-25 RX ORDER — FUROSEMIDE 40 MG
TABLET ORAL
Qty: 100 TABLET | Refills: 3 | Status: SHIPPED | OUTPATIENT
Start: 2020-02-25 | End: 2021-03-22

## 2020-02-25 NOTE — TELEPHONE ENCOUNTER
[facility] stated it's fine to send the detailed order to [facility].  Romana Khan LPN on 2/25/2020 at 11:03 AM

## 2020-03-11 ENCOUNTER — HEALTH MAINTENANCE LETTER (OUTPATIENT)
Age: 75
End: 2020-03-11

## 2020-03-16 DIAGNOSIS — R60.9 EDEMA, UNSPECIFIED TYPE: ICD-10-CM

## 2020-03-16 DIAGNOSIS — M10.9 GOUT, UNSPECIFIED CAUSE, UNSPECIFIED CHRONICITY, UNSPECIFIED SITE: ICD-10-CM

## 2020-03-18 RX ORDER — POTASSIUM CHLORIDE 750 MG/1
10 TABLET, EXTENDED RELEASE ORAL DAILY
Qty: 90 TABLET | Refills: 3 | Status: SHIPPED | OUTPATIENT
Start: 2020-03-18 | End: 2022-01-21

## 2020-03-18 RX ORDER — ALLOPURINOL 300 MG/1
300 TABLET ORAL DAILY
Qty: 90 TABLET | Refills: 3 | Status: SHIPPED | OUTPATIENT
Start: 2020-03-18 | End: 2021-02-17

## 2020-03-18 NOTE — TELEPHONE ENCOUNTER
Routing refill request to provider for review/approval because:  Labs not current:    Iris Quinones RN on 3/18/2020 at 9:07 AM

## 2020-03-27 ENCOUNTER — TELEPHONE (OUTPATIENT)
Dept: INTERNAL MEDICINE | Facility: OTHER | Age: 75
End: 2020-03-27

## 2020-03-27 NOTE — TELEPHONE ENCOUNTER
FYI:  Aleisha from [facility] stated that the patient cancelled his appointment for now.  Patient is doing well with adjustment to Lasix.  If Dr. Gallego would like any changes to please call.  Romana Khan LPN on 3/27/2020 at 10:09 AM

## 2020-03-27 NOTE — TELEPHONE ENCOUNTER
Dr. Gallego.  Please let me know what to do with this patient.  He doesn't come to appointments alone and I thought he probably couldn't do a phone visit.  Thank you.    Gale Pedro on 3/27/2020 at 9:54 AM

## 2020-04-02 ENCOUNTER — TELEPHONE (OUTPATIENT)
Dept: INTERNAL MEDICINE | Facility: OTHER | Age: 75
End: 2020-04-02

## 2020-04-02 NOTE — TELEPHONE ENCOUNTER
MAF-Rx timolol eye drops-medication unavailable, need different rx. Aleisha from [facility] says pharmacy faxed info. Please call. Thank you.  Shameka Dove

## 2020-04-02 NOTE — TELEPHONE ENCOUNTER
Aleisha called and stated that Pt was able to receive eye med from eye doc and to disregard at this time.    Will disregard and close encounter.    Florence Rodriguez RN  ....................  4/2/2020   1:11 PM

## 2020-04-14 ENCOUNTER — TELEPHONE (OUTPATIENT)
Dept: INTERNAL MEDICINE | Facility: OTHER | Age: 75
End: 2020-04-14

## 2020-04-14 DIAGNOSIS — R60.9 EDEMA, UNSPECIFIED TYPE: Primary | ICD-10-CM

## 2020-04-14 DIAGNOSIS — K59.03 DRUG-INDUCED CONSTIPATION: Primary | ICD-10-CM

## 2020-04-14 RX ORDER — LUBIPROSTONE 24 UG/1
24 CAPSULE ORAL DAILY PRN
Qty: 30 CAPSULE | Refills: 1 | Status: SHIPPED | OUTPATIENT
Start: 2020-04-14 | End: 2020-07-20

## 2020-04-14 NOTE — TELEPHONE ENCOUNTER
After verifying pts name and date of birth with Maryanne, Maryanne was notified of message below and states understanding.

## 2020-04-14 NOTE — TELEPHONE ENCOUNTER
[facility] called and stated a nurse called and said that patient needed to have labs, there are no current orders. Patient was put in for a lab appt on 4/17. Please place orders or call and let them know it is supposed to be a later date.  Walter Mir on 4/14/2020 at 1:07 PM

## 2020-04-14 NOTE — TELEPHONE ENCOUNTER
I spoke to Maryanne from [facility], and she states Eliezer currently taking his Lasix 4 days a week, Tuesday's, Thursday's, Saturday's and Sunday's.  He is doing well with this regimen per nursing staff.  He was suppose to have blood work done.  She wants to know if he still needs blood work.  Jessica Prajapati, JOSEY ([facility])......................4/14/2020  9:51 AM

## 2020-04-15 RX ORDER — LUBIPROSTONE 24 UG/1
CAPSULE, GELATIN COATED ORAL
Qty: 30 CAPSULE | OUTPATIENT
Start: 2020-04-15

## 2020-04-15 NOTE — TELEPHONE ENCOUNTER
Amitiza refilled on 4/14/2020 #30 x 1 reffill to [facility].    Chrissie Travis RN on 4/15/2020 at 1:23 PM

## 2020-04-17 DIAGNOSIS — R60.9 EDEMA, UNSPECIFIED TYPE: ICD-10-CM

## 2020-04-17 LAB
ANION GAP SERPL CALCULATED.3IONS-SCNC: 9 MMOL/L (ref 3–14)
BUN SERPL-MCNC: 16 MG/DL (ref 7–25)
CALCIUM SERPL-MCNC: 9.8 MG/DL (ref 8.6–10.3)
CHLORIDE SERPL-SCNC: 99 MMOL/L (ref 98–107)
CO2 SERPL-SCNC: 30 MMOL/L (ref 21–31)
CREAT SERPL-MCNC: 0.9 MG/DL (ref 0.7–1.3)
GFR SERPL CREATININE-BSD FRML MDRD: 83 ML/MIN/{1.73_M2}
GLUCOSE SERPL-MCNC: 114 MG/DL (ref 70–105)
POTASSIUM SERPL-SCNC: 3.8 MMOL/L (ref 3.5–5.1)
SODIUM SERPL-SCNC: 138 MMOL/L (ref 134–144)

## 2020-04-17 PROCEDURE — 36415 COLL VENOUS BLD VENIPUNCTURE: CPT | Mod: ZL | Performed by: INTERNAL MEDICINE

## 2020-04-17 PROCEDURE — 80048 BASIC METABOLIC PNL TOTAL CA: CPT | Mod: ZL | Performed by: INTERNAL MEDICINE

## 2020-05-14 DIAGNOSIS — K59.00 CONSTIPATION: ICD-10-CM

## 2020-05-15 RX ORDER — DOCUSATE SODIUM 100 MG/1
CAPSULE, LIQUID FILLED ORAL
Qty: 180 CAPSULE | Refills: 2 | Status: SHIPPED | OUTPATIENT
Start: 2020-05-15 | End: 2021-01-18

## 2020-05-15 NOTE — TELEPHONE ENCOUNTER
"Requested Prescriptions   Pending Prescriptions Disp Refills     docusate sodium (COLACE) 100 MG capsule [Pharmacy Med Name: DOCUSATE SODIUM 100MG SOFTGEL] 100 capsule 1     Sig: TAKE 1 CAPSULE (100 MG) BY MOUTH 2 TIMES DAILY.       [facility] Protocol Passed - 5/14/2020  9:47 AM        Passed - Patient is age 6 or older        Passed - Recent (12 mo) or future (30 days) visit within the authorizing provider's specialty     Patient has had an office visit with the authorizing provider or a provider within the authorizing providers department within the previous 12 mos or has a future within next 30 days. See \"Patient Info\" tab in inbasket, or \"Choose Columns\" in Meds & Orders section of the refill encounter.              Passed - Medication is active on med list         LOV 01/17/2020 with Armaan Gallego MD.  Prescription approved per [facility] Refill Protocol.  Jacque Junior RN on 5/15/2020 at 11:54 AM          "

## 2020-06-24 DIAGNOSIS — R32 URINARY INCONTINENCE, UNSPECIFIED TYPE: ICD-10-CM

## 2020-06-25 NOTE — TELEPHONE ENCOUNTER
Routing refill request to provider for review/approval because:  Drug not on the [facility] refill protocol     LOV: 1/17/2020  Chrissie Travis RN on 6/25/2020 at 8:35 AM

## 2020-07-16 ENCOUNTER — MYC MEDICAL ADVICE (OUTPATIENT)
Dept: INTERNAL MEDICINE | Facility: OTHER | Age: 75
End: 2020-07-16

## 2020-07-16 DIAGNOSIS — F79 INTELLECTUAL DISABILITY: ICD-10-CM

## 2020-07-16 DIAGNOSIS — K59.03 DRUG-INDUCED CONSTIPATION: ICD-10-CM

## 2020-07-16 DIAGNOSIS — Q87.40 MARFAN'S SYNDROME: ICD-10-CM

## 2020-07-20 RX ORDER — BUSPIRONE HYDROCHLORIDE 5 MG/1
5 TABLET ORAL 3 TIMES DAILY
Qty: 270 TABLET | Refills: 2 | Status: SHIPPED | OUTPATIENT
Start: 2020-07-20 | End: 2021-03-22

## 2020-07-20 RX ORDER — OLANZAPINE 5 MG/1
TABLET ORAL
Qty: 90 TABLET | Refills: 3 | Status: SHIPPED | OUTPATIENT
Start: 2020-07-20 | End: 2021-06-21

## 2020-07-20 RX ORDER — LUBIPROSTONE 24 UG/1
CAPSULE, GELATIN COATED ORAL
Qty: 30 CAPSULE | Refills: 1 | Status: SHIPPED | OUTPATIENT
Start: 2020-07-20 | End: 2020-07-31

## 2020-07-31 DIAGNOSIS — K59.03 DRUG-INDUCED CONSTIPATION: ICD-10-CM

## 2020-07-31 RX ORDER — LUBIPROSTONE 24 UG/1
24 CAPSULE ORAL
Qty: 30 CAPSULE | Refills: 11 | Status: SHIPPED | OUTPATIENT
Start: 2020-07-31 | End: 2020-12-30

## 2020-09-15 DIAGNOSIS — N39.41 URGE INCONTINENCE OF URINE: ICD-10-CM

## 2020-09-15 DIAGNOSIS — K59.00 CONSTIPATION, UNSPECIFIED CONSTIPATION TYPE: ICD-10-CM

## 2020-09-16 RX ORDER — TAMSULOSIN HYDROCHLORIDE 0.4 MG/1
0.4 CAPSULE ORAL DAILY
Qty: 90 CAPSULE | Refills: 1 | Status: SHIPPED | OUTPATIENT
Start: 2020-09-16 | End: 2021-02-17

## 2020-09-16 RX ORDER — SENNOSIDES 8.6 MG
TABLET ORAL
Qty: 180 TABLET | Refills: 3 | Status: SHIPPED | OUTPATIENT
Start: 2020-09-16 | End: 2021-09-22

## 2020-09-16 NOTE — TELEPHONE ENCOUNTER
[id number] sent Rx request for the following:      TAMSULOSIN 0.4MG CAPSULE   Sig: TAKE 1 CAPSULE (0.4 MG) BY MOUTH DAILY      Last Prescription Date:   10/16/2019  Last Fill Qty/Refills:         90, R-3    Last Office Visit:              1/17/2020   Future Office visit:           none      Prescription refilled per RN Medication Refill Policy.................... Franco Cole RN ....................  9/16/2020   2:14 PM

## 2020-09-16 NOTE — TELEPHONE ENCOUNTER
[facility] sent Rx request for the following:   sennosides (SENOKOT) 8.6 MG tablet   Sig: Take 1 tablet by mouth twice daily.     Last Prescription Date:   10/16/2019  Last Fill Qty/Refills:         180, R-3    Last Office Visit:              01/17/2020   Future Office visit:           none  Prescription refilled per RN Medication Refill Policy.................... Jessica Callahan RN ....................  9/16/2020   3:18 PM

## 2020-10-19 ENCOUNTER — OFFICE VISIT (OUTPATIENT)
Dept: INTERNAL MEDICINE | Facility: OTHER | Age: 75
End: 2020-10-19
Attending: INTERNAL MEDICINE
Payer: COMMERCIAL

## 2020-10-19 VITALS — DIASTOLIC BLOOD PRESSURE: 80 MMHG | SYSTOLIC BLOOD PRESSURE: 122 MMHG | TEMPERATURE: 98.1 F

## 2020-10-19 DIAGNOSIS — Z23 ENCOUNTER FOR IMMUNIZATION: ICD-10-CM

## 2020-10-19 DIAGNOSIS — R73.09 ABNORMAL GLUCOSE: ICD-10-CM

## 2020-10-19 DIAGNOSIS — I71.20 THORACIC AORTIC ANEURYSM WITHOUT RUPTURE (H): Primary | ICD-10-CM

## 2020-10-19 DIAGNOSIS — F69 BEHAVIOR PROBLEM, ADULT: ICD-10-CM

## 2020-10-19 DIAGNOSIS — K59.00 CONSTIPATION, UNSPECIFIED CONSTIPATION TYPE: ICD-10-CM

## 2020-10-19 DIAGNOSIS — Q87.40 MARFAN'S SYNDROME: ICD-10-CM

## 2020-10-19 DIAGNOSIS — M10.9 GOUT, UNSPECIFIED CAUSE, UNSPECIFIED CHRONICITY, UNSPECIFIED SITE: ICD-10-CM

## 2020-10-19 LAB
ALBUMIN SERPL-MCNC: 4.3 G/DL (ref 3.5–5.7)
ALP SERPL-CCNC: 88 U/L (ref 34–104)
ALT SERPL W P-5'-P-CCNC: 9 U/L (ref 7–52)
ANION GAP SERPL CALCULATED.3IONS-SCNC: 9 MMOL/L (ref 3–14)
AST SERPL W P-5'-P-CCNC: 13 U/L (ref 13–39)
BILIRUB SERPL-MCNC: 1 MG/DL (ref 0.3–1)
BUN SERPL-MCNC: 18 MG/DL (ref 7–25)
CALCIUM SERPL-MCNC: 9.7 MG/DL (ref 8.6–10.3)
CHLORIDE SERPL-SCNC: 100 MMOL/L (ref 98–107)
CO2 SERPL-SCNC: 27 MMOL/L (ref 21–31)
CREAT SERPL-MCNC: 0.86 MG/DL (ref 0.7–1.3)
ERYTHROCYTE [DISTWIDTH] IN BLOOD BY AUTOMATED COUNT: 13.4 % (ref 10–15)
GFR SERPL CREATININE-BSD FRML MDRD: 87 ML/MIN/{1.73_M2}
GLUCOSE SERPL-MCNC: 161 MG/DL (ref 70–105)
HBA1C MFR BLD: 5.6 % (ref 4–6)
HCT VFR BLD AUTO: 45 % (ref 40–53)
HGB BLD-MCNC: 14.9 G/DL (ref 13.3–17.7)
MCH RBC QN AUTO: 30.1 PG (ref 26.5–33)
MCHC RBC AUTO-ENTMCNC: 33.1 G/DL (ref 31.5–36.5)
MCV RBC AUTO: 91 FL (ref 78–100)
PLATELET # BLD AUTO: 233 10E9/L (ref 150–450)
POTASSIUM SERPL-SCNC: 4 MMOL/L (ref 3.5–5.1)
PROT SERPL-MCNC: 7.2 G/DL (ref 6.4–8.9)
RBC # BLD AUTO: 4.95 10E12/L (ref 4.4–5.9)
SODIUM SERPL-SCNC: 136 MMOL/L (ref 134–144)
WBC # BLD AUTO: 7.8 10E9/L (ref 4–11)

## 2020-10-19 PROCEDURE — 36415 COLL VENOUS BLD VENIPUNCTURE: CPT | Mod: ZL | Performed by: INTERNAL MEDICINE

## 2020-10-19 PROCEDURE — 80053 COMPREHEN METABOLIC PANEL: CPT | Mod: ZL | Performed by: INTERNAL MEDICINE

## 2020-10-19 PROCEDURE — G0008 ADMIN INFLUENZA VIRUS VAC: HCPCS

## 2020-10-19 PROCEDURE — G0463 HOSPITAL OUTPT CLINIC VISIT: HCPCS | Mod: 25

## 2020-10-19 PROCEDURE — 83036 HEMOGLOBIN GLYCOSYLATED A1C: CPT | Mod: ZL | Performed by: INTERNAL MEDICINE

## 2020-10-19 PROCEDURE — 99215 OFFICE O/P EST HI 40 MIN: CPT | Performed by: INTERNAL MEDICINE

## 2020-10-19 PROCEDURE — 85027 COMPLETE CBC AUTOMATED: CPT | Mod: ZL | Performed by: INTERNAL MEDICINE

## 2020-10-19 PROCEDURE — G0463 HOSPITAL OUTPT CLINIC VISIT: HCPCS

## 2020-10-19 NOTE — LETTER
October 19, 2020      Dhiraj De La Cruz  [address] [facility] [address]        Dear Eliezer,    Below are the results of your recent labs:    Results for orders placed or performed in visit on 10/19/20   Hemoglobin A1c     Status: None   Result Value Ref Range    Hemoglobin A1C 5.6 4.0 - 6.0 %   CBC with platelets     Status: None   Result Value Ref Range    WBC 7.8 4.0 - 11.0 10e9/L    RBC Count 4.95 4.4 - 5.9 10e12/L    Hemoglobin 14.9 13.3 - 17.7 g/dL    Hematocrit 45.0 40.0 - 53.0 %    MCV 91 78 - 100 fl    MCH 30.1 26.5 - 33.0 pg    MCHC 33.1 31.5 - 36.5 g/dL    RDW 13.4 10.0 - 15.0 %    Platelet Count 233 150 - 450 10e9/L   Comprehensive metabolic panel     Status: Abnormal   Result Value Ref Range    Sodium 136 134 - 144 mmol/L    Potassium 4.0 3.5 - 5.1 mmol/L    Chloride 100 98 - 107 mmol/L    Carbon Dioxide 27 21 - 31 mmol/L    Anion Gap 9 3 - 14 mmol/L    Glucose 161 (H) 70 - 105 mg/dL    Urea Nitrogen 18 7 - 25 mg/dL    Creatinine 0.86 0.70 - 1.30 mg/dL    GFR Estimate 87 >60 mL/min/[1.73_m2]    GFR Estimate If Black >90 >60 mL/min/[1.73_m2]    Calcium 9.7 8.6 - 10.3 mg/dL    Bilirubin Total 1.0 0.3 - 1.0 mg/dL    Albumin 4.3 3.5 - 5.7 g/dL    Protein Total 7.2 6.4 - 8.9 g/dL    Alkaline Phosphatase 88 34 - 104 U/L    ALT 9 7 - 52 U/L    AST 13 13 - 39 U/L        Your blood tests look great.  Your glucose or sugar diabetes test is elevated but the hemoglobin A1c which is a regular diabetes test is normal so you do not have diabetes.  I am satisfied with your results.  Congratulations on this report.    Sincerely,        Armaan Gallego MD  Internal Medicine  [facility]

## 2020-10-19 NOTE — NURSING NOTE
Chief Complaint   Patient presents with     Physical       Initial /80 (BP Location: Right arm, Patient Position: Sitting, Cuff Size: Adult Regular)   Temp 98.1  F (36.7  C) (Tympanic)  Estimated body mass index is 23.06 kg/m  as calculated from the following:    Height as of 3/28/18: 1.829 m (6').    Weight as of 3/28/18: 77.1 kg (170 lb).  Medication Reconciliation: complete    Maribel Hall LPN

## 2020-10-28 NOTE — TELEPHONE ENCOUNTER
During procedure patient did have 2 runs of SVT. Each time heart was from 180-208.  The first occurrence patient converted on her own. Second occurrence patient was instructed to bare down and then converted back to sinus tach. OPAL Wan was notified at time of report. Patient has no complaints at this time.    Routing refill request to provider for review/approval because:  Labs not current:  Last uric acid noted on 11/14/2014    LOV: 11/30/17    Chrissie Travis RN on 6/18/2018 at 9:19 AM

## 2020-11-18 DIAGNOSIS — R52 PAIN: ICD-10-CM

## 2020-11-18 DIAGNOSIS — M19.90 OSTEOARTHRITIS, UNSPECIFIED OSTEOARTHRITIS TYPE, UNSPECIFIED SITE: ICD-10-CM

## 2020-11-18 RX ORDER — ACETAMINOPHEN 500 MG
TABLET ORAL
Qty: 360 TABLET | Refills: 1 | Status: SHIPPED | OUTPATIENT
Start: 2020-11-18 | End: 2021-05-13

## 2020-11-18 NOTE — TELEPHONE ENCOUNTER
[id number]  sent Rx request for the following:   acetaminophen (ACETAMINOPHEN EXTRA STRENGTH) 500 MG tablet   Sig:TAKE 2 TABLETS (1,000 MG) BY MOUTH TWICE DAILY NDC#[id number] (REDTABS ONLY    Last Prescription Date:   11/21/2019  Last Fill Qty/Refills:         360, R-3    Last Office Visit:              10/19/2020   Future Office visit:           None noted     Analgesics (Non-Narcotic Tylenol and ASA Only) Passed - 11/18/2020  3:51 PM   Prescription approved per [facility] Refill Protocol.  Mckenna Perez RN ....................  11/18/2020   4:17 PM

## 2020-11-19 DIAGNOSIS — F69 BEHAVIOR PROBLEM, ADULT: ICD-10-CM

## 2020-11-20 ENCOUNTER — TELEPHONE (OUTPATIENT)
Dept: INTERNAL MEDICINE | Facility: OTHER | Age: 75
End: 2020-11-20

## 2020-11-20 RX ORDER — LORAZEPAM 2 MG/1
TABLET ORAL
Qty: 15 TABLET | Refills: 1 | Status: SHIPPED | OUTPATIENT
Start: 2020-11-20 | End: 2020-12-09

## 2020-11-20 NOTE — TELEPHONE ENCOUNTER
sent Rx request for the following:   LORazepam (ATIVAN) 2 MG tablet  Sig: Prior to appointment prn    Last Prescription Date:   02/07/2020  Last Fill Qty/Refills:         15, R-1    Last Office Visit:              10/19/2020   Future Office visit:           none  Routing refill request to provider for review/approval because:  Drug not on the [facility], [facility] or [facility] refill protocol or controlled substance      Unable to complete prescription refill per RN Medication Refill Policy.................... Jessica Callahan RN ....................  11/20/2020   9:45 AM

## 2020-12-09 DIAGNOSIS — F69 BEHAVIOR PROBLEM, ADULT: ICD-10-CM

## 2020-12-09 RX ORDER — LORAZEPAM 2 MG/1
TABLET ORAL
Qty: 15 TABLET | Refills: 1 | Status: SHIPPED | OUTPATIENT
Start: 2020-12-09 | End: 2021-12-16

## 2020-12-09 NOTE — TELEPHONE ENCOUNTER
[id number] sent Rx request for the following:      LORazepam (ATIVAN) 2 MG tablet  Sig: Prior to appointment prn      Last Prescription Date:   11/20/2020  Last Fill Qty/Refills:         15, R-1    Last Office Visit:              10/19/2020   Future Office visit:           none    Routing refill request to provider for review/approval because:  Drug not on the [facility], [facility] or [facility] refill protocol or controlled substance    Unable to complete prescription refill per RN Medication Refill Policy.................... Franco Cole RN ....................  12/9/2020   4:01 PM

## 2020-12-22 ENCOUNTER — TELEPHONE (OUTPATIENT)
Dept: INTERNAL MEDICINE | Facility: OTHER | Age: 75
End: 2020-12-22

## 2020-12-22 NOTE — TELEPHONE ENCOUNTER
Called Aleisha at [facility] and verified name and date of birth of patient. Notified her of note from Dr. Gallego and no further questions.  Maribel Hall LPN on 12/22/2020 at 3:38 PM

## 2020-12-22 NOTE — TELEPHONE ENCOUNTER
His sister Jessica wants him to check with MAF before he gets COVID vaccine - is it OK for him to have it when available    Please call to advise    Thank you

## 2020-12-30 DIAGNOSIS — K59.03 DRUG-INDUCED CONSTIPATION: Primary | ICD-10-CM

## 2020-12-30 RX ORDER — LUBIPROSTONE 24 UG/1
CAPSULE, GELATIN COATED ORAL
Qty: 90 CAPSULE | Refills: 1 | Status: SHIPPED | OUTPATIENT
Start: 2020-12-30 | End: 2021-07-07

## 2020-12-30 NOTE — TELEPHONE ENCOUNTER
[facility] of [place] sent Rx request for the following:    From pharmacy: THEY WOULD LIKE 90 AT A TIME - CAN WE GET A NEW RX FOR THAT?     Last Prescription :  lubiprostone (AMITIZA) 24 MCG capsule 30 capsule 11 7/31/2020  No   Sig - Route: Take 1 capsule (24 mcg) by mouth daily (with breakfast) - Oral   Prescribing Provider's NPI: [id number]  AMARI GARCIA    [id number] - [place], [place] - [address]     New prescription sent to pharmacy, per [facility] refill protocol, and the above written order, for 90 days and 1 additional refills. Pooja Carlos RN .............. 12/30/2020  4:38 PM

## 2021-01-18 DIAGNOSIS — K59.00 CONSTIPATION: ICD-10-CM

## 2021-01-18 RX ORDER — DOCUSATE SODIUM 100 MG/1
CAPSULE, LIQUID FILLED ORAL
Qty: 180 CAPSULE | Refills: 2 | Status: SHIPPED | OUTPATIENT
Start: 2021-01-18 | End: 2021-09-22

## 2021-01-18 NOTE — TELEPHONE ENCOUNTER
[id number] sent Rx request for the following:      DOK 100MG CAPSULE  Sig: TAKE 1 CAPSULE (100 MG) BY MOUTH 2 TIMES DAILY      Last Prescription Date:   5/15/2020  Last Fill Qty/Refills:         180, R-2    Last Office Visit:              10/19/2020   Future Office visit:           none    Prescription refilled per RN Medication Refill Policy.................... Franco Cole RN ....................  1/18/2021   11:03 AM

## 2021-02-16 DIAGNOSIS — M10.9 GOUT, UNSPECIFIED CAUSE, UNSPECIFIED CHRONICITY, UNSPECIFIED SITE: ICD-10-CM

## 2021-02-16 DIAGNOSIS — R60.9 EDEMA, UNSPECIFIED TYPE: ICD-10-CM

## 2021-02-16 DIAGNOSIS — N39.41 URGE INCONTINENCE OF URINE: ICD-10-CM

## 2021-02-17 RX ORDER — ALLOPURINOL 300 MG/1
300 TABLET ORAL DAILY
Qty: 90 TABLET | Refills: 3 | Status: SHIPPED | OUTPATIENT
Start: 2021-02-17 | End: 2022-01-20

## 2021-02-17 RX ORDER — POTASSIUM CHLORIDE 750 MG/1
10 TABLET, EXTENDED RELEASE ORAL DAILY
Qty: 90 TABLET | Refills: 3 | OUTPATIENT
Start: 2021-02-17

## 2021-02-17 RX ORDER — TAMSULOSIN HYDROCHLORIDE 0.4 MG/1
0.4 CAPSULE ORAL DAILY
Qty: 90 CAPSULE | Refills: 2 | Status: SHIPPED | OUTPATIENT
Start: 2021-02-17 | End: 2021-10-20

## 2021-02-17 NOTE — TELEPHONE ENCOUNTER
1.)tamsulosin (FLOMAX) 0.4 MG capsule  Alpha Blockers Passed  Prescription refilled per RN Medication Refill Policy    2.)potassium chloride ER (K-TAB/KLOR-CON) 10 MEQ CR tablet  Redundant refill request refused: Too soon      3.)allopurinol (ZYLOPRIM) 300 MG tablet  Last Written Prescription Date:  03/18/2020  Last Fill Quantity: 90,  # refills: 3   Last office visit: 10/19/2020 with prescribing provider   Future Office Visit:  None  Will route to provider for consideration due to :  Gout Agents Protocol Failed  Has Uric Acid on file in past 12 months and value is less than 6     Jessicariri Callahan RN  ....................  2/17/2021   8:52 AM

## 2021-03-07 ENCOUNTER — MYC MEDICAL ADVICE (OUTPATIENT)
Dept: INTERNAL MEDICINE | Facility: OTHER | Age: 76
End: 2021-03-07

## 2021-03-22 DIAGNOSIS — Q87.40 MARFAN'S SYNDROME: ICD-10-CM

## 2021-03-22 DIAGNOSIS — R60.0 PERIPHERAL EDEMA: ICD-10-CM

## 2021-03-22 RX ORDER — BUSPIRONE HYDROCHLORIDE 5 MG/1
5 TABLET ORAL 3 TIMES DAILY
Qty: 270 TABLET | Refills: 1 | Status: SHIPPED | OUTPATIENT
Start: 2021-03-22 | End: 2021-09-22

## 2021-03-22 RX ORDER — FUROSEMIDE 40 MG
TABLET ORAL
Qty: 100 TABLET | Refills: 1 | Status: SHIPPED | OUTPATIENT
Start: 2021-03-22 | End: 2021-06-21

## 2021-03-22 NOTE — TELEPHONE ENCOUNTER
"Requested Prescriptions   Pending Prescriptions Disp Refills     furosemide (LASIX) 40 MG tablet [Pharmacy Med Name: FUROSEMIDE 40MG TABLET] 100 tablet 3     Sig: TAKE 2 TABS (80MG) BY MOUTH ON TUE THR SAT SUN       Diuretics (Including Combos) Protocol Passed - 3/22/2021 10:58 AM        Passed - Blood pressure under 140/90 in past 12 months     BP Readings from Last 3 Encounters:   10/19/20 122/80   02/11/20 120/70   01/17/20 120/80                 Passed - Recent (12 mo) or future (30 days) visit within the authorizing provider's specialty     Patient has had an office visit with the authorizing provider or a provider within the authorizing providers department within the previous 12 mos or has a future within next 30 days. See \"Patient Info\" tab in inbasket, or \"Choose Columns\" in Meds & Orders section of the refill encounter.              Passed - Medication is active on med list        Passed - Patient is age 18 or older        Passed - Normal serum creatinine on file in past 12 months     Recent Labs   Lab Test 10/19/20  1010   CR 0.86              Passed - Normal serum potassium on file in past 12 months             Passed - Normal serum sodium on file in past 12 months     Recent Labs   Lab Test 10/19/20  1010                    busPIRone (BUSPAR) 5 MG tablet [Pharmacy Med Name: BUSPIRONE 5MG TABLET] 270 tablet 2     Sig: TAKE 1 TABLET (5 MG) BY MOUTH 3 TIMES DAILY       Atypical Antidepressants Protocol Passed - 3/22/2021 10:58 AM        Passed - Recent (12 mo) or future (30 days) visit within the authorizing provider's specialty     Patient has had an office visit with the authorizing provider or a provider within the authorizing providers department within the previous 12 mos or has a future within next 30 days. See \"Patient Info\" tab in inbasket, or \"Choose Columns\" in Meds & Orders section of the refill encounter.            Passed - Medication active on med list        Passed - Patient is age 18 " or older         10/19/20 and remains current on med list.  Prescription refilled per RN Medication RefillPolicy.................... Carolee Fontanez RN ....................  3/22/2021   1:01 PM

## 2021-04-25 ENCOUNTER — HEALTH MAINTENANCE LETTER (OUTPATIENT)
Age: 76
End: 2021-04-25

## 2021-05-13 DIAGNOSIS — R52 PAIN: ICD-10-CM

## 2021-05-13 DIAGNOSIS — M19.90 OSTEOARTHRITIS, UNSPECIFIED OSTEOARTHRITIS TYPE, UNSPECIFIED SITE: ICD-10-CM

## 2021-05-13 RX ORDER — ACETAMINOPHEN 500 MG/1
TABLET ORAL
Qty: 360 TABLET | Refills: 1 | Status: SHIPPED | OUTPATIENT
Start: 2021-05-13 | End: 2021-10-14

## 2021-05-13 NOTE — TELEPHONE ENCOUNTER
"[id number] [place] sent Rx request for the following:   SM PAIN RELIEVER EX  MG tablet   Sig: TAKE 2 TABS (1,000MG) BY MOUTH TWICE A DAY NDC#[id number] (REDTABSONLY)    Last Prescription Date:   11/18/2020  Last Fill Qty/Refills:         360, R-1    Last Office Visit:              10/19/2020 (Lashonda)   Future Office visit:           None noted   Analgesics (Non-Narcotic Tylenol and ASA Only) Passed - 5/13/2021 12:15 PM        Passed - Recent (12 mo) or future (30 days) visit within the authorizing provider's specialty     Patient has had an office visit with the authorizing provider or a provider within the authorizing providers department within the previous 12 mos or has a future within next 30 days. See \"Patient Info\" tab in inbasket, or \"Choose Columns\" in Meds & Orders section of the refill encounter.              Passed - Patient is 7 months old or older     If patient is a peds patient of the age 7 mos -12 years, ok to refill using weight-based dosing.     If >3g daily and/or sig is not \"prn\", check for liver enzymes. If normal in the last year, ok to refill.  If not, refer to the provider.          Passed - Medication is active on med list         Prescription approved per [facility] Refill Protocol.  Christine Shane RN ....................  5/13/2021   12:27 PM      "

## 2021-06-21 DIAGNOSIS — F79 INTELLECTUAL DISABILITY: ICD-10-CM

## 2021-06-21 DIAGNOSIS — R60.0 PERIPHERAL EDEMA: ICD-10-CM

## 2021-06-21 RX ORDER — FUROSEMIDE 40 MG
TABLET ORAL
Qty: 100 TABLET | Refills: 1 | Status: SHIPPED | OUTPATIENT
Start: 2021-06-21 | End: 2021-12-13

## 2021-06-21 RX ORDER — OLANZAPINE 5 MG/1
TABLET ORAL
Qty: 90 TABLET | Refills: 3 | Status: SHIPPED | OUTPATIENT
Start: 2021-06-21 | End: 2022-05-23

## 2021-07-06 DIAGNOSIS — K59.03 DRUG-INDUCED CONSTIPATION: ICD-10-CM

## 2021-07-07 RX ORDER — LUBIPROSTONE 24 UG/1
CAPSULE, GELATIN COATED ORAL
Qty: 90 CAPSULE | Refills: 1 | Status: SHIPPED | OUTPATIENT
Start: 2021-07-07 | End: 2022-01-17

## 2021-08-23 DIAGNOSIS — K59.00 CONSTIPATION, UNSPECIFIED CONSTIPATION TYPE: ICD-10-CM

## 2021-08-23 DIAGNOSIS — Q87.40 MARFAN'S SYNDROME: ICD-10-CM

## 2021-08-24 DIAGNOSIS — R60.9 EDEMA, UNSPECIFIED TYPE: ICD-10-CM

## 2021-08-24 RX ORDER — BUSPIRONE HYDROCHLORIDE 5 MG/1
5 TABLET ORAL 3 TIMES DAILY
Qty: 270 TABLET | Refills: 0 | OUTPATIENT
Start: 2021-08-24

## 2021-08-24 RX ORDER — SENNOSIDES 8.6 MG
TABLET ORAL
Qty: 180 TABLET | Refills: 2 | OUTPATIENT
Start: 2021-08-24

## 2021-08-24 NOTE — TELEPHONE ENCOUNTER
busPIRone (BUSPAR) 5 MG tablet 270 tablet 1 3/22/2021  No   Sig - Route: TAKE 1 TABLET (5 MG) BY MOUTH 3 TIMES CHRISTOPHER     To [facility]     Refill request to soon.  Chrissie Travis RN on 8/24/2021 at 12:15 PM

## 2021-08-24 NOTE — TELEPHONE ENCOUNTER
sennosides (SENOKOT) 8.6 MG tablet 180 tablet 3 9/16/2020  No   Sig: TAKE 1 TABLET BY MOUTH TWICE DAILY     To [facility]   Refill request to soon.    Chrissie Travis RN on 8/24/2021 at 12:17 PM

## 2021-08-25 RX ORDER — POTASSIUM CHLORIDE 750 MG/1
10 TABLET, EXTENDED RELEASE ORAL DAILY
Qty: 90 TABLET | Refills: 2 | OUTPATIENT
Start: 2021-08-25

## 2021-08-25 NOTE — TELEPHONE ENCOUNTER
LOV and labs: 10/19/2020  Called [facility] as last refill request was noted as 8/20/2021 and they states they have plenty of refills left.  Disregard refill request.    Chrissie Travis RN on 8/25/2021 at 9:30 AM

## 2021-09-22 DIAGNOSIS — R32 URINARY INCONTINENCE, UNSPECIFIED TYPE: ICD-10-CM

## 2021-09-22 DIAGNOSIS — K59.00 CONSTIPATION: ICD-10-CM

## 2021-09-22 DIAGNOSIS — K59.00 CONSTIPATION, UNSPECIFIED CONSTIPATION TYPE: ICD-10-CM

## 2021-09-22 DIAGNOSIS — Q87.40 MARFAN'S SYNDROME: ICD-10-CM

## 2021-09-22 RX ORDER — DOCUSATE SODIUM 100 MG/1
CAPSULE, LIQUID FILLED ORAL
Qty: 180 CAPSULE | Refills: 0 | Status: SHIPPED | OUTPATIENT
Start: 2021-09-22 | End: 2021-12-23

## 2021-09-22 RX ORDER — SENNOSIDES 8.6 MG
1 TABLET ORAL 2 TIMES DAILY
Qty: 180 TABLET | Refills: 0 | Status: SHIPPED | OUTPATIENT
Start: 2021-09-22 | End: 2021-12-16

## 2021-09-22 RX ORDER — BUSPIRONE HYDROCHLORIDE 5 MG/1
5 TABLET ORAL 3 TIMES DAILY
Qty: 270 TABLET | Refills: 0 | Status: SHIPPED | OUTPATIENT
Start: 2021-09-22 | End: 2021-11-23

## 2021-09-22 NOTE — TELEPHONE ENCOUNTER
Routing refill request to provider for review/approval because:  Drug not on the [facility] refill protocol     LOV: 10/19/2020    Chrissie Travis RN on 9/22/2021 at 10:38 AM

## 2021-09-22 NOTE — LETTER
September 22, 2021      Dhiraj De La Cruz  [address] [facility] [address]        Dear Dhiraj,     This letter is to remind you that you are nearly due for your annual exam with Armaan Gallego. Your last comprehensive visit was more than 12 months ago.    Please call the clinic at [phone] to schedule your appointment.    If you are no longer seeing Armaan Gallego for primary care, please call to let us know. Doing so will remove you from our call/contact list.    Thank you for choosing [facility] for your health care needs.    Sincerely,    Refill CHIN  [facility]

## 2021-09-22 NOTE — TELEPHONE ENCOUNTER
[facility] of [place] sent Rx request for the following:      Requested Prescriptions   Pending Prescriptions Disp Refills     Incontinence Supply Disposable MISC [Pharmacy Med Name: ATTENDS PULL-ON LARGE BRIEFS] 150 each 10     Sig: ATTENDS ADV UDWR SZ:LARGE PC #[id number] 18/PP DX:N39.41 HCPC:  144EA/8PKGS/29DS   Last Prescription Date:   6/25/20  Last Fill Qty/Refills:         150, R-11    Last Office Visit:              10/19/20  Future Office visit:           None  Routing refill request to provider for review/approval because:  DME not on the [facility], [facility] or  Health refill protocol or controlled substance    Patient will be due for annual exam next month. Reminder letter sent.    Unable to complete prescription refill per RN Medication Refill Policy. Pooja Carlos RN .............. 9/22/2021  4:48 PM

## 2021-10-12 DIAGNOSIS — M19.90 OSTEOARTHRITIS, UNSPECIFIED OSTEOARTHRITIS TYPE, UNSPECIFIED SITE: ICD-10-CM

## 2021-10-12 DIAGNOSIS — R52 PAIN: ICD-10-CM

## 2021-10-14 RX ORDER — ACETAMINOPHEN 500 MG/1
TABLET ORAL
Qty: 360 TABLET | Refills: 0 | Status: SHIPPED | OUTPATIENT
Start: 2021-10-14 | End: 2021-12-16

## 2021-10-18 DIAGNOSIS — N39.41 URGE INCONTINENCE OF URINE: ICD-10-CM

## 2021-10-18 NOTE — LETTER
October 20, 2021      Dhiraj De La Cruz  [address] [facility] [address]        Dear Dhiraj,         A refill of tamsulosin (FLOMAX) 0.4 MG capsule  has been requested by your pharmacy.  We noticed that it has been greater than 12 months since your last comprehensive visit and labs with Armaan Gallego MD.  A limited 90 day supply has been sent to your pharmacy at this time.    Additional refills require a medication management appointment.  Your health is very important to us.  Please call the clinic at [phone] to schedule your appointment.    Thank you,    The Refill Nurse  [facility]

## 2021-10-20 RX ORDER — TAMSULOSIN HYDROCHLORIDE 0.4 MG/1
0.4 CAPSULE ORAL DAILY
Qty: 90 CAPSULE | Refills: 0 | Status: SHIPPED | OUTPATIENT
Start: 2021-10-20 | End: 2021-12-23

## 2021-10-20 NOTE — TELEPHONE ENCOUNTER
"Requested Prescriptions   Pending Prescriptions Disp Refills     tamsulosin (FLOMAX) 0.4 MG capsule [Pharmacy Med Name: TAMSULOSIN 0.4MG CAPSULE] 90 capsule 1     Sig: TAKE 1 CAPSULE (0.4 MG) BY MOUTH DAILY       Alpha Blockers Passed - 10/18/2021  9:59 AM        Passed - Blood pressure under 140/90 in past 12 months     BP Readings from Last 3 Encounters:   10/19/20 122/80   02/11/20 120/70   01/17/20 120/80                 Passed - Recent (12 mo) or future (30 days) visit within the authorizing provider's specialty     Patient has had an office visit with the authorizing provider or a provider within the authorizing providers department within the previous 12 mos or has a future within next 30 days. See \"Patient Info\" tab in inbasket, or \"Choose Columns\" in Meds & Orders section of the refill encounter.              Passed - Patient does not have Tadalafil, Vardenafil, or Sildenafil on their medication list        Passed - Medication is active on med list        Passed - Patient is 18 years of age or older         LOV: 10/19/2020 (Lashonda)  Prescription approved per [facility] Refill Protocol for 90 day priscila refill. MyChart reminder letter sent to patient regarding appointment need. Christine Shane RN ....................  10/20/2021   3:47 PM          "

## 2021-11-01 ENCOUNTER — OFFICE VISIT (OUTPATIENT)
Dept: INTERNAL MEDICINE | Facility: OTHER | Age: 76
End: 2021-11-01
Attending: INTERNAL MEDICINE
Payer: COMMERCIAL

## 2021-11-01 VITALS — TEMPERATURE: 98.5 F | RESPIRATION RATE: 22 BRPM | DIASTOLIC BLOOD PRESSURE: 80 MMHG | SYSTOLIC BLOOD PRESSURE: 138 MMHG

## 2021-11-01 DIAGNOSIS — R32 URINARY INCONTINENCE, UNSPECIFIED TYPE: ICD-10-CM

## 2021-11-01 DIAGNOSIS — M10.9 GOUT, UNSPECIFIED CAUSE, UNSPECIFIED CHRONICITY, UNSPECIFIED SITE: ICD-10-CM

## 2021-11-01 DIAGNOSIS — F69 BEHAVIOR PROBLEM, ADULT: ICD-10-CM

## 2021-11-01 DIAGNOSIS — Q87.40 MARFAN'S SYNDROME: Primary | ICD-10-CM

## 2021-11-01 DIAGNOSIS — K59.00 CONSTIPATION, UNSPECIFIED CONSTIPATION TYPE: ICD-10-CM

## 2021-11-01 DIAGNOSIS — R73.09 ABNORMAL GLUCOSE: ICD-10-CM

## 2021-11-01 DIAGNOSIS — F79 INTELLECTUAL DISABILITY: ICD-10-CM

## 2021-11-01 DIAGNOSIS — Q89.3 SITUS INVERSUS: ICD-10-CM

## 2021-11-01 LAB
ALBUMIN SERPL-MCNC: 4.3 G/DL (ref 3.5–5.7)
ALP SERPL-CCNC: 96 U/L (ref 34–104)
ALT SERPL W P-5'-P-CCNC: 10 U/L (ref 7–52)
ANION GAP SERPL CALCULATED.3IONS-SCNC: 11 MMOL/L (ref 3–14)
AST SERPL W P-5'-P-CCNC: 15 U/L (ref 13–39)
BILIRUB SERPL-MCNC: 1.5 MG/DL (ref 0.3–1)
BUN SERPL-MCNC: 18 MG/DL (ref 7–25)
CALCIUM SERPL-MCNC: 9.9 MG/DL (ref 8.6–10.3)
CHLORIDE BLD-SCNC: 101 MMOL/L (ref 98–107)
CO2 SERPL-SCNC: 27 MMOL/L (ref 21–31)
CREAT SERPL-MCNC: 0.8 MG/DL (ref 0.7–1.3)
GFR SERPL CREATININE-BSD FRML MDRD: 87 ML/MIN/1.73M2
GLUCOSE BLD-MCNC: 175 MG/DL (ref 70–105)
HBA1C MFR BLD: 6.2 % (ref 4–6.2)
POTASSIUM BLD-SCNC: 3.6 MMOL/L (ref 3.5–5.1)
PROT SERPL-MCNC: 7.4 G/DL (ref 6.4–8.9)
SODIUM SERPL-SCNC: 139 MMOL/L (ref 134–144)
TSH SERPL DL<=0.005 MIU/L-ACNC: 2.17 MU/L (ref 0.4–4)

## 2021-11-01 PROCEDURE — G0439 PPPS, SUBSEQ VISIT: HCPCS | Performed by: INTERNAL MEDICINE

## 2021-11-01 PROCEDURE — 36415 COLL VENOUS BLD VENIPUNCTURE: CPT | Mod: ZL | Performed by: INTERNAL MEDICINE

## 2021-11-01 PROCEDURE — 83036 HEMOGLOBIN GLYCOSYLATED A1C: CPT | Mod: ZL | Performed by: INTERNAL MEDICINE

## 2021-11-01 PROCEDURE — 82040 ASSAY OF SERUM ALBUMIN: CPT | Mod: ZL | Performed by: INTERNAL MEDICINE

## 2021-11-01 PROCEDURE — 84443 ASSAY THYROID STIM HORMONE: CPT | Mod: ZL | Performed by: INTERNAL MEDICINE

## 2021-11-01 NOTE — NURSING NOTE
Patient comes in for wellness visit, but unable to verbalize.Perri Gonzalez LPN ....................11/1/2021   10:10 AM  Chief Complaint   Patient presents with     Physical     wellness       Initial /80 (BP Location: Left arm, Patient Position: Sitting, Cuff Size: Adult Large)   Temp 98.5  F (36.9  C) (Tympanic)   Resp 22  Estimated body mass index is 23.06 kg/m  as calculated from the following:    Height as of 3/28/18: 1.829 m (6').    Weight as of 3/28/18: 77.1 kg (170 lb).  Medication Reconciliation: complete    Perri Gonzalez LPN   Unable to get some vital signs due to uncooperative.  Perri Gonzalez LPN ....................11/1/2021   10:11 AM

## 2021-11-01 NOTE — LETTER
November 1, 2021      Dhiraj De La Cruz  [address] [facility] [address]        Dear Eliezer and staff,    Below are the results of your recent labs:    Results for orders placed or performed in visit on 11/01/21   Comprehensive metabolic panel     Status: Abnormal   Result Value Ref Range    Sodium 139 134 - 144 mmol/L    Potassium 3.6 3.5 - 5.1 mmol/L    Chloride 101 98 - 107 mmol/L    Carbon Dioxide (CO2) 27 21 - 31 mmol/L    Anion Gap 11 3 - 14 mmol/L    Urea Nitrogen 18 7 - 25 mg/dL    Creatinine 0.80 0.70 - 1.30 mg/dL    Calcium 9.9 8.6 - 10.3 mg/dL    Glucose 175 (H) 70 - 105 mg/dL    Alkaline Phosphatase 96 34 - 104 U/L    AST 15 13 - 39 U/L    ALT 10 7 - 52 U/L    Protein Total 7.4 6.4 - 8.9 g/dL    Albumin 4.3 3.5 - 5.7 g/dL    Bilirubin Total 1.5 (H) 0.3 - 1.0 mg/dL    GFR Estimate 87 >60 mL/min/1.73m2   Hemoglobin A1c     Status: Normal   Result Value Ref Range    Hemoglobin A1C 6.2 4.0 - 6.2 %   TSH     Status: Normal   Result Value Ref Range    TSH 2.17 0.40 - 4.00 mU/L        Your blood tests look great.  Congratulations on this report.    Sincerely,        Armaan Gallego MD  Internal Medicine  [facility]

## 2021-11-01 NOTE — PROGRESS NOTES
SUBJECTIVE:   Dhiraj De La Cruz is a 76 year old male who presents for Preventive Visit.      Patient has been advised of split billing requirements and indicates understanding: No   Are you in the first 12 months of your Medicare coverage?  No    HPI     He is brought in today for yearly checkup.  He has been stable in most respects.  The staff in attendance today states that he has been having a little bit more irritability lately but not enough to warrant changing his medications.  Patient continues to be stable with his diet.  He continues to have some incontinence on occasion but nothing worse.  His bowels seem to be doing well with his current regimen.  There are no other concerns brought forward at this time by his home or the staff that is present with him today.    Medications are reconciled.  Past medical history, past surgical history, family history and social histories are reviewed and updated.  Immunizations are reviewed.    Do you feel safe in your environment? Unable to verbalize    Have you ever done Advance Care Planning? (For example, a Health Directive, POLST, or a discussion with a medical provider or your loved ones about your wishes): Yes, advance care planning is on file.       Fall risk  Unable to verbalize    Cognitive Screening Unable to complete due to virtual visit; need for additional assessment in future face-to-face visit    Do you have sleep apnea, excessive snoring or daytime drowsiness?: no    Reviewed and updated as needed this visit by clinical staff  Tobacco  Allergies    Med Hx  Surg Hx  Fam Hx  Soc Hx        Reviewed and updated as needed this visit by Provider                Social History     Tobacco Use     Smoking status: Former Smoker     Packs/day: 0.00     Quit date: 2004     Years since quittin.7     Smokeless tobacco: Never Used   Substance Use Topics     Alcohol use: No         No flowsheet data found.        Current Outpatient Medications   Medication      allopurinol (ZYLOPRIM) 300 MG tablet     AMITIZA 24 MCG capsule     busPIRone (BUSPAR) 5 MG tablet     docusate sodium (DOK) 100 MG capsule     furosemide (LASIX) 40 MG tablet     haloperidol (HALDOL) 5 MG tablet     Incontinence Supply Disposable MISC     latanoprost (XALATAN) 0.005 % ophthalmic solution     LORazepam (ATIVAN) 2 MG tablet     OLANZapine (ZYPREXA) 5 MG tablet     omeprazole (PRILOSEC) 20 MG DR capsule     potassium chloride ER (K-TAB/KLOR-CON) 10 MEQ CR tablet     psyllium 30.9 % POWD     sennosides (SENOKOT) 8.6 MG tablet     SM PAIN RELIEVER EX  MG tablet     tamsulosin (FLOMAX) 0.4 MG capsule     timolol (TIMOPTIC-XE) 0.5 % ophthalmic gel-form     No current facility-administered medications for this visit.         Current providers sharing in care for this patient include:   Patient Care Team:  Armaan Gallego MD as PCP - General (Internal Medicine)  Armaan Gallego MD as Assigned PCP    The following health maintenance items are reviewed in Epic and correct as of today:  Health Maintenance Due   Topic Date Due     COLORECTAL CANCER SCREENING  Never done     HEPATITIS C SCREENING  Never done     LIPID  Never done     MEDICARE ANNUAL WELLNESS VISIT  Never done     ZOSTER IMMUNIZATION (2 of 3) 03/13/2014     FALL RISK ASSESSMENT  10/15/2020     PHQ-2  01/01/2021     Lab work is in process          Review of Systems  Unable    OBJECTIVE:   /80 (BP Location: Left arm, Patient Position: Sitting, Cuff Size: Adult Large)   Temp 98.5  F (36.9  C) (Tympanic)   Resp 22  Estimated body mass index is 23.06 kg/m  as calculated from the following:    Height as of 3/28/18: 1.829 m (6').    Weight as of 3/28/18: 77.1 kg (170 lb).  Physical Exam  GENERAL: healthy, alert and no distress  EYES: Eyes grossly normal to inspection, PERRL and conjunctivae and sclerae normal  NECK: no adenopathy, no asymmetry, masses, or scars and thyroid normal to palpation  RESP: lungs clear to auscultation - no  rales, rhonchi or wheezes  CV: regular rate and rhythm, normal S1 S2, no S3 or S4, no murmur, click or rub, no peripheral edema and peripheral pulses strong  ABDOMEN: soft, nontender, no hepatosplenomegaly, no masses and bowel sounds normal  MS: no gross musculoskeletal defects noted, no edema        ASSESSMENT / PLAN:       ICD-10-CM    1. Marfan's syndrome  Q87.40    2. Mental retardation  F79    3. 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 Urinary incontinence, unspecified type  R32    5. Gout, unspecified cause, unspecified chronicity, unspecified site  M10.9    6. Behavior problem, adult  F69    7. Abnormal glucose  R73.09 Comprehensive metabolic panel     Hemoglobin A1c   8. Constipation, unspecified constipation type  K59.00 TSH       Patient has been advised of split billing requirements and indicates understanding: unable  COUNSELING:    Eliezer appears to be stable at this time.  Medications will continue without change.  Consider increasing olanzapine if needed for increased behavioral problems.  Lab is pending and I will send a letter with the results.  Follow-up will be annually, sooner if needed.    Reviewed preventive health counseling, as reflected in patient instructions       unable    Estimated body mass index is 23.06 kg/m  as calculated from the following:    Height as of 3/28/18: 1.829 m (6').    Weight as of 3/28/18: 77.1 kg (170 lb).        He reports that he quit smoking about 17 years ago. He smoked 0.00 packs per day. He has never used smokeless tobacco.      Appropriate preventive services were discussed with this patient, including applicable screening as appropriate for cardiovascular disease, diabetes, osteopenia/osteoporosis, and glaucoma.  As appropriate for age/gender, discussed screening for colorectal cancer, prostate cancer, breast cancer, and cervical cancer. Checklist reviewing preventive services available has been given to the patient.    Reviewed patients plan of care and provided an AVS.  The  libby Hearn meets the Care Plan requirement. This Care Plan has been established and reviewed with the .    Counseling Resources:  ATP IV Guidelines  Pooled Cohorts Equation Calculator  Breast Cancer Risk Calculator  Breast Cancer: Medication to Reduce Risk  FRAX Risk Assessment  ICSI Preventive Guidelines  Dietary Guidelines for Americans, 2010  USDA's MyPlate  ASA Prophylaxis  Lung CA Screening    AMARI GARCIA MD  [facility]    Identified Health Risks:

## 2021-11-19 DIAGNOSIS — Q87.40 MARFAN'S SYNDROME: ICD-10-CM

## 2021-11-23 RX ORDER — BUSPIRONE HYDROCHLORIDE 5 MG/1
5 TABLET ORAL 3 TIMES DAILY
Qty: 270 TABLET | Refills: 0 | Status: SHIPPED | OUTPATIENT
Start: 2021-11-23 | End: 2021-12-13

## 2021-11-26 ENCOUNTER — MYC MEDICAL ADVICE (OUTPATIENT)
Dept: INTERNAL MEDICINE | Facility: OTHER | Age: 76
End: 2021-11-26

## 2021-11-26 ENCOUNTER — HOSPITAL ENCOUNTER (EMERGENCY)
Facility: OTHER | Age: 76
Discharge: HOME | End: 2021-11-26
Attending: PHYSICIAN ASSISTANT | Admitting: PHYSICIAN ASSISTANT
Payer: COMMERCIAL

## 2021-11-26 VITALS
WEIGHT: 170 LBS | TEMPERATURE: 98.1 F | SYSTOLIC BLOOD PRESSURE: 126 MMHG | HEART RATE: 120 BPM | DIASTOLIC BLOOD PRESSURE: 72 MMHG | OXYGEN SATURATION: 95 % | BODY MASS INDEX: 23.06 KG/M2 | RESPIRATION RATE: 20 BRPM

## 2021-11-26 DIAGNOSIS — R45.1 AGITATION: ICD-10-CM

## 2021-11-26 LAB
ALBUMIN UR-MCNC: 30 MG/DL
APPEARANCE UR: CLEAR
BILIRUB UR QL STRIP: NEGATIVE
COLOR UR AUTO: YELLOW
GLUCOSE UR STRIP-MCNC: NEGATIVE MG/DL
HGB UR QL STRIP: NEGATIVE
HYALINE CASTS: 7 /LPF
KETONES UR STRIP-MCNC: NEGATIVE MG/DL
LEUKOCYTE ESTERASE UR QL STRIP: NEGATIVE
MUCOUS THREADS #/AREA URNS LPF: PRESENT /LPF
NITRATE UR QL: NEGATIVE
PH UR STRIP: 5 [PH] (ref 5–9)
RBC URINE: 1 /HPF
SP GR UR STRIP: 1.02 (ref 1–1.03)
UROBILINOGEN UR STRIP-MCNC: NORMAL MG/DL
WBC URINE: 1 /HPF

## 2021-11-26 PROCEDURE — 99283 EMERGENCY DEPT VISIT LOW MDM: CPT | Performed by: PHYSICIAN ASSISTANT

## 2021-11-26 PROCEDURE — 250N000013 HC RX MED GY IP 250 OP 250 PS 637: Performed by: PHYSICIAN ASSISTANT

## 2021-11-26 PROCEDURE — 81001 URINALYSIS AUTO W/SCOPE: CPT | Performed by: PHYSICIAN ASSISTANT

## 2021-11-26 PROCEDURE — 96361 HYDRATE IV INFUSION ADD-ON: CPT | Performed by: PHYSICIAN ASSISTANT

## 2021-11-26 PROCEDURE — 99283 EMERGENCY DEPT VISIT LOW MDM: CPT | Mod: 25 | Performed by: PHYSICIAN ASSISTANT

## 2021-11-26 PROCEDURE — 96360 HYDRATION IV INFUSION INIT: CPT | Performed by: PHYSICIAN ASSISTANT

## 2021-11-26 PROCEDURE — 258N000003 HC RX IP 258 OP 636: Performed by: PHYSICIAN ASSISTANT

## 2021-11-26 RX ORDER — SODIUM CHLORIDE 9 MG/ML
INJECTION, SOLUTION INTRAVENOUS CONTINUOUS
Status: DISCONTINUED | OUTPATIENT
Start: 2021-11-26 | End: 2021-11-26 | Stop reason: HOSPADM

## 2021-11-26 RX ORDER — OLANZAPINE 5 MG/1
5 TABLET, ORALLY DISINTEGRATING ORAL AT BEDTIME
Status: DISCONTINUED | OUTPATIENT
Start: 2021-11-26 | End: 2021-11-26

## 2021-11-26 RX ORDER — OLANZAPINE 5 MG/1
5 TABLET, ORALLY DISINTEGRATING ORAL ONCE
Status: COMPLETED | OUTPATIENT
Start: 2021-11-26 | End: 2021-11-26

## 2021-11-26 RX ADMIN — SODIUM CHLORIDE: 9 INJECTION, SOLUTION INTRAVENOUS at 12:05

## 2021-11-26 RX ADMIN — SODIUM CHLORIDE: 9 INJECTION, SOLUTION INTRAVENOUS at 13:18

## 2021-11-26 RX ADMIN — OLANZAPINE 5 MG: 5 TABLET, ORALLY DISINTEGRATING ORAL at 12:04

## 2021-11-26 NOTE — ED TRIAGE NOTES
Patient comes to ER via EMS from [facility]. Patient was apparently combative there. Is pleasant and calm at this time. Per EMS, was also sitting in a recliner for 12 hours straight. Is currently heavily incontinent of urine. Unsure if patient was sitting in his own urine for 12 hours, per EMS. MD at bedside.

## 2021-11-26 NOTE — ED NOTES
Called pt's group home to let them know he is being discharged, staff Shawnee states she will have someone come pick him up.

## 2021-11-26 NOTE — ED PROVIDER NOTES
History     Chief Complaint   Patient presents with     Aggressive Behavior     HPI  Dhiraj De La Cruz is a 76 year old male who presents to the emergency department for evaluation due to some agitation - according to the  staff when they arrived he is alert at his baseline he is not agitated and follows directions appropriately he has been sitting in his chair for 12 hours and urine.  The ambulance was summoned due to aggressive behavior the patient has not been aggressive here in the emergency department he has been quite pleasant he does scream out on occasion but that seems to be his baseline.  He has been polite and there is been no concerns the [facility] history is obtained from the  staff.  Medical record was reviewed also    Allergies:  No Known Allergies    Problem List:    Patient Active Problem List    Diagnosis Date Noted     Behavior problem, adult 11/20/2018     Priority: Medium     Aortic aneurysm (H) 02/12/2018     Priority: Medium     Overview:   history of       Edema 02/12/2018     Priority: Medium     Gastritis 02/12/2018     Priority: Medium     Overview:   nonsteriodal induced gastritis with GI bleed       Glaucoma 02/12/2018     Priority: Medium     Inguinal hernia, left 02/12/2018     Priority: Medium     Marfan's syndrome 02/12/2018     Priority: Medium     Mental retardation 02/12/2018     Priority: Medium     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     Priority: Medium     Venous (peripheral) insufficiency 02/12/2018     Priority: Medium     Overview:   legs       Urinary incontinence 02/12/2018     Priority: Medium     ACP (advance care planning) 12/12/2013     Priority: Medium     Gout 12/09/2013     Priority: Medium     Abnormal glucose 08/10/2010     Priority: Medium     Constipation 07/22/2010     Priority: Medium        Past Medical History:    Past Medical History:   Diagnosis Date     Acute pancreatitis without infection or necrosis      Aortic aneurysm, intrathoracic (H)      Chronic  constipation      Gastritis without bleeding      Glaucoma      Gout      Hyperglycemia      Intellectual disability      Marfan's syndrome      Other specified disorders of veins      Situs inversus      Urinary incontinence        Past Surgical History:    Past Surgical History:   Procedure Laterality Date     CHOLECYSTECTOMY         Family History:    History reviewed. No pertinent family history.    Social History:  Marital Status:  Single [1]  Social History     Tobacco Use     Smoking status: Former Smoker     Packs/day: 0.00     Quit date: 2004     Years since quittin.8     Smokeless tobacco: Never Used   Substance Use Topics     Alcohol use: No     Drug use: Never        Medications:    allopurinol (ZYLOPRIM) 300 MG tablet  AMITIZA 24 MCG capsule  busPIRone (BUSPAR) 5 MG tablet  docusate sodium (DOK) 100 MG capsule  furosemide (LASIX) 40 MG tablet  haloperidol (HALDOL) 5 MG tablet  Incontinence Supply Disposable MISC  latanoprost (XALATAN) 0.005 % ophthalmic solution  LORazepam (ATIVAN) 2 MG tablet  OLANZapine (ZYPREXA) 5 MG tablet  omeprazole (PRILOSEC) 20 MG DR capsule  potassium chloride ER (K-TAB/KLOR-CON) 10 MEQ CR tablet  psyllium 30.9 % POWD  sennosides (SENOKOT) 8.6 MG tablet  SM PAIN RELIEVER EX  MG tablet  tamsulosin (FLOMAX) 0.4 MG capsule  timolol (TIMOPTIC-XE) 0.5 % ophthalmic gel-form          Review of Systems  Please see HPI for pertinent positives and negatives.  All other systems reviewed and found to be negative.    Physical Exam   BP: 108/82  Pulse: 120  Temp: 98.1  F (36.7  C)  Resp: 20  Weight: 77.1 kg (170 lb)  SpO2: 97 %    Vitals:    21 1113 21 1115 21 1408   BP: 108/82  126/72   Pulse:  120    Resp: 20  20   Temp: 98.1  F (36.7  C)     TempSrc: Tympanic     SpO2:  97% 95%   Weight: 77.1 kg (170 lb)       The patient's heart rate at 1411 is 87 bpm.    Physical Exam  Exam:  Constitutional: Alert eyes open at baseline  Head: Normocephalic.    Neck:  Neck supple   ENT: ENT exam normal, no neck nodes or sinus tenderness  Cardiovascular:  RRR. No murmurs, clicks gallops or rub  Respiratory:  Lungs clear  Gastrointestinal: Abdomen soft, non-tender. BS normal. No masses, organomegaly  : Deferred  Musculoskeletal: extremities normal  Skin: No rashes or contusions on exposed skin surfaces  Neurologic: Moves all 4 extremities at his baseline  Psychiatric: At baseline    ED Course        Procedures       Results for orders placed or performed during the hospital encounter of 11/26/21 (from the past 24 hour(s))   UA reflex to Microscopic   Result Value Ref Range    Color Urine Yellow Colorless, Straw, Light Yellow, Yellow    Appearance Urine Clear Clear    Glucose Urine Negative Negative mg/dL    Bilirubin Urine Negative Negative    Ketones Urine Negative Negative mg/dL    Specific Gravity Urine 1.025 1.000 - 1.030    Blood Urine Negative Negative    pH Urine 5.0 5.0 - 9.0    Protein Albumin Urine 30  (A) Negative mg/dL    Urobilinogen Urine Normal Normal, 2.0 mg/dL    Nitrite Urine Negative Negative    Leukocyte Esterase Urine Negative Negative    RBC Urine 1 <=2 /HPF    WBC Urine 1 <=5 /HPF    Mucus Urine Present (A) None Seen /LPF    Hyaline Casts Urine 7 (H) <=2 /LPF       Medications   sodium chloride 0.9% infusion (0 mLs Intravenous Stopped 11/26/21 1357)   sodium chloride 0.9% infusion (0 mLs Intravenous Stopped 11/26/21 1357)   OLANZapine zydis (zyPREXA) ODT tab 5 mg (5 mg Oral Given 11/26/21 1204)       Assessments & Plan (with Medical Decision Making)     I have reviewed the nursing notes.    I have reviewed the findings, diagnosis, plan and need for follow up with the patient.  Differential diagnosis at this point includes: infection/sepsis, CNS lesions/bleed, atypical migraine, encephalopathy, hypoglycemia, metabolic derangements, electrolyte disturbances, medication effects, substance abuse, seizure, CVA-TIA, dementia-delirium, acute blood loss, as well as  other etiologies.    Pleasant gentleman who presents for evaluation he did receive a bolus of normal saline and he also had bedside glucometer.  He did receive some Zyprexa and did well.  He will be discharged back to his group home.  I explained my diagnostic considerations and recommendations and the patient voiced an understanding and was in agreement with the treatment plan. All questions were answered. We discussed potential side effects of any prescribed or recommended therapies, as well as expectations for response to treatments.      New Prescriptions    No medications on file       Final diagnoses:   Agitation       11/26/2021   [facility]  11/26/21 9895

## 2021-12-09 ENCOUNTER — APPOINTMENT (OUTPATIENT)
Dept: CT IMAGING | Facility: OTHER | Age: 76
End: 2021-12-09
Attending: PHYSICIAN ASSISTANT
Payer: COMMERCIAL

## 2021-12-09 ENCOUNTER — APPOINTMENT (OUTPATIENT)
Dept: GENERAL RADIOLOGY | Facility: OTHER | Age: 76
End: 2021-12-09
Attending: PHYSICIAN ASSISTANT
Payer: COMMERCIAL

## 2021-12-09 ENCOUNTER — HOSPITAL ENCOUNTER (EMERGENCY)
Facility: OTHER | Age: 76
Discharge: HOME | End: 2021-12-09
Attending: PHYSICIAN ASSISTANT | Admitting: PHYSICIAN ASSISTANT
Payer: COMMERCIAL

## 2021-12-09 VITALS
OXYGEN SATURATION: 95 % | SYSTOLIC BLOOD PRESSURE: 136 MMHG | HEIGHT: 73 IN | BODY MASS INDEX: 21.42 KG/M2 | RESPIRATION RATE: 24 BRPM | HEART RATE: 99 BPM | TEMPERATURE: 98 F | WEIGHT: 161.6 LBS | DIASTOLIC BLOOD PRESSURE: 76 MMHG

## 2021-12-09 DIAGNOSIS — E86.0 DEHYDRATION: ICD-10-CM

## 2021-12-09 DIAGNOSIS — K59.00 CONSTIPATION, UNSPECIFIED CONSTIPATION TYPE: ICD-10-CM

## 2021-12-09 LAB
ALBUMIN UR-MCNC: NEGATIVE MG/DL
ANION GAP SERPL CALCULATED.3IONS-SCNC: 11 MMOL/L (ref 3–14)
APPEARANCE UR: CLEAR
BASOPHILS # BLD AUTO: 0 10E3/UL (ref 0–0.2)
BASOPHILS NFR BLD AUTO: 0 %
BILIRUB UR QL STRIP: NEGATIVE
BUN SERPL-MCNC: 14 MG/DL (ref 7–25)
CALCIUM SERPL-MCNC: 10 MG/DL (ref 8.6–10.3)
CHLORIDE BLD-SCNC: 101 MMOL/L (ref 98–107)
CO2 SERPL-SCNC: 24 MMOL/L (ref 21–31)
COLOR UR AUTO: NORMAL
CREAT SERPL-MCNC: 0.89 MG/DL (ref 0.7–1.3)
EOSINOPHIL # BLD AUTO: 0 10E3/UL (ref 0–0.7)
EOSINOPHIL NFR BLD AUTO: 0 %
ERYTHROCYTE [DISTWIDTH] IN BLOOD BY AUTOMATED COUNT: 12.7 % (ref 10–15)
GFR SERPL CREATININE-BSD FRML MDRD: 84 ML/MIN/1.73M2
GLUCOSE BLD-MCNC: 159 MG/DL (ref 70–105)
GLUCOSE UR STRIP-MCNC: NEGATIVE MG/DL
HCT VFR BLD AUTO: 43.3 % (ref 40–53)
HGB BLD-MCNC: 15 G/DL (ref 13.3–17.7)
HGB UR QL STRIP: NEGATIVE
IMM GRANULOCYTES # BLD: 0 10E3/UL
IMM GRANULOCYTES NFR BLD: 0 %
KETONES UR STRIP-MCNC: NEGATIVE MG/DL
LACTATE SERPL-SCNC: 1.8 MMOL/L (ref 0.7–2)
LACTATE SERPL-SCNC: 3.4 MMOL/L (ref 0.7–2)
LEUKOCYTE ESTERASE UR QL STRIP: NEGATIVE
LYMPHOCYTES # BLD AUTO: 1.2 10E3/UL (ref 0.8–5.3)
LYMPHOCYTES NFR BLD AUTO: 12 %
MCH RBC QN AUTO: 30.2 PG (ref 26.5–33)
MCHC RBC AUTO-ENTMCNC: 34.6 G/DL (ref 31.5–36.5)
MCV RBC AUTO: 87 FL (ref 78–100)
MONOCYTES # BLD AUTO: 0.8 10E3/UL (ref 0–1.3)
MONOCYTES NFR BLD AUTO: 7 %
NEUTROPHILS # BLD AUTO: 8.4 10E3/UL (ref 1.6–8.3)
NEUTROPHILS NFR BLD AUTO: 81 %
NITRATE UR QL: NEGATIVE
NRBC # BLD AUTO: 0 10E3/UL
NRBC BLD AUTO-RTO: 0 /100
PH UR STRIP: 7 [PH] (ref 5–9)
PLATELET # BLD AUTO: 275 10E3/UL (ref 150–450)
POTASSIUM BLD-SCNC: 3.8 MMOL/L (ref 3.5–5.1)
RBC # BLD AUTO: 4.96 10E6/UL (ref 4.4–5.9)
SODIUM SERPL-SCNC: 136 MMOL/L (ref 134–144)
SP GR UR STRIP: 1.01 (ref 1–1.03)
UROBILINOGEN UR STRIP-MCNC: NORMAL MG/DL
WBC # BLD AUTO: 10.4 10E3/UL (ref 4–11)

## 2021-12-09 PROCEDURE — 96361 HYDRATE IV INFUSION ADD-ON: CPT | Performed by: PHYSICIAN ASSISTANT

## 2021-12-09 PROCEDURE — 96360 HYDRATION IV INFUSION INIT: CPT | Performed by: PHYSICIAN ASSISTANT

## 2021-12-09 PROCEDURE — 36415 COLL VENOUS BLD VENIPUNCTURE: CPT | Performed by: PHYSICIAN ASSISTANT

## 2021-12-09 PROCEDURE — 70450 CT HEAD/BRAIN W/O DYE: CPT

## 2021-12-09 PROCEDURE — 250N000013 HC RX MED GY IP 250 OP 250 PS 637: Performed by: PHYSICIAN ASSISTANT

## 2021-12-09 PROCEDURE — 83605 ASSAY OF LACTIC ACID: CPT | Performed by: PHYSICIAN ASSISTANT

## 2021-12-09 PROCEDURE — 74019 RADEX ABDOMEN 2 VIEWS: CPT

## 2021-12-09 PROCEDURE — 80048 BASIC METABOLIC PNL TOTAL CA: CPT | Performed by: PHYSICIAN ASSISTANT

## 2021-12-09 PROCEDURE — 99285 EMERGENCY DEPT VISIT HI MDM: CPT | Mod: 25 | Performed by: PHYSICIAN ASSISTANT

## 2021-12-09 PROCEDURE — 71045 X-RAY EXAM CHEST 1 VIEW: CPT

## 2021-12-09 PROCEDURE — 81003 URINALYSIS AUTO W/O SCOPE: CPT | Performed by: PHYSICIAN ASSISTANT

## 2021-12-09 PROCEDURE — 87040 BLOOD CULTURE FOR BACTERIA: CPT | Performed by: PHYSICIAN ASSISTANT

## 2021-12-09 PROCEDURE — 70486 CT MAXILLOFACIAL W/O DYE: CPT

## 2021-12-09 PROCEDURE — 85025 COMPLETE CBC W/AUTO DIFF WBC: CPT | Performed by: PHYSICIAN ASSISTANT

## 2021-12-09 PROCEDURE — 99284 EMERGENCY DEPT VISIT MOD MDM: CPT | Performed by: PHYSICIAN ASSISTANT

## 2021-12-09 PROCEDURE — 258N000003 HC RX IP 258 OP 636: Performed by: PHYSICIAN ASSISTANT

## 2021-12-09 RX ORDER — MAGNESIUM CARB/ALUMINUM HYDROX 105-160MG
296 TABLET,CHEWABLE ORAL ONCE
Status: COMPLETED | OUTPATIENT
Start: 2021-12-09 | End: 2021-12-09

## 2021-12-09 RX ORDER — SODIUM CHLORIDE 9 MG/ML
INJECTION, SOLUTION INTRAVENOUS CONTINUOUS
Status: DISCONTINUED | OUTPATIENT
Start: 2021-12-09 | End: 2021-12-09 | Stop reason: HOSPADM

## 2021-12-09 RX ORDER — DOXYCYCLINE 100 MG/1
100 CAPSULE ORAL 2 TIMES DAILY
Qty: 2 CAPSULE | Refills: 0 | Status: SHIPPED | OUTPATIENT
Start: 2021-12-09 | End: 2021-12-13

## 2021-12-09 RX ORDER — POLYETHYLENE GLYCOL 3350 17 G/17G
1 POWDER, FOR SOLUTION ORAL DAILY
Qty: 510 G | Refills: 0 | Status: SHIPPED | OUTPATIENT
Start: 2021-12-09 | End: 2021-12-16

## 2021-12-09 RX ADMIN — MAGNESIUM CITRATE 296 ML: 1.75 LIQUID ORAL at 19:49

## 2021-12-09 RX ADMIN — SODIUM CHLORIDE: 9 INJECTION, SOLUTION INTRAVENOUS at 17:38

## 2021-12-09 RX ADMIN — SODIUM CHLORIDE: 9 INJECTION, SOLUTION INTRAVENOUS at 16:38

## 2021-12-09 ASSESSMENT — MIFFLIN-ST. JEOR: SCORE: 1521.89

## 2021-12-09 NOTE — DISCHARGE INSTRUCTIONS
FOLLOW UP IN 8-12 HOURS FOR A RECHECK   PLEASE RETURN SOONER IF YOU HAVE FURTHER CONCERNS  TAKE ALL PREVIOUS AND ANY NEW MEDS AS PRESCRIBED       THE DISCHARGE INSTRUCTIONS ARE INTENDED AS A COMPLEMENT TO AND NOT A REPLACEMENT FOR THE VERBAL INSTRUCTIONS THAT I HAVE PROVIDED YOU TONIGHT. AFTER GOING OVER THE PLAN OF CARE TONIGHT, INCLUDING SIDE EFFECTS, ADVERSE REACTIONS OF ALL MEDICATIONS PRESCRIBED (THIS WILL BE PROVIDED TO YOU AT THE PHARMACY ALSO) AND PROVIDING YOU WITH THE VERBAL INSTRUCTIONS AT DISCHARGE YOU HAVE HAD THE OPPORTUNITY TO ASK FURTHER QUESTIONS AND TO CLARIFY UNCERTAINTIES. SINCE YOU HAVE NO FURTHER QUESTIONS PLEASE HAVE A WONDERFUL SAFE EVENING THANK YOU.     Blood Pressure   ________________________________________________________________________  KEY POINTS  Blood pressure is the force of blood against artery walls as the heart pumps blood through the body.  Most people with high blood pressure have no symptoms.  If your blood pressure is too high, your healthcare provider may advise that you lose excess weight, use less salt in your food, be physically active, or take medicine.  If you have low blood pressure that is causing symptoms, do not skip meals, drink plenty of liquids, and stand up slowly after laying down.  ________________________________________________________________________  What is blood pressure?  Blood pressure is the force of blood against artery walls as the heart pumps blood through the body. Many people can improve their blood pressure or prevent high blood pressure with diet changes, more activity, and weight loose if needed. Even if you have a strong family history of high blood pressure, you can improve your blood pressure with a healthy lifestyle.  Blood pressure can go up briefly with exercise, stress, pain, or strong emotions. Drinking alcohol or using some illegal drugs, like cocaine, can also raise blood pressure.  Blood pressure normally goes down when you are  "resting, sleeping, or feeling calm and relaxed. It can also go down if you are dehydrated and are not drinking enough liquids, such as if you are working or exercising in the hot sun.  How is blood pressure measured?  Most people with high blood pressure have no symptoms. The only way to find out if your blood pressure is normal, too high, or too low is to have it measured. Your healthcare provider measures blood pressure using an inflatable cuff around your upper arm and either a stethoscope or a machine that shows the result.  Low blood pressure usually means blood pressure that is lower than 90/60 or is low enough to cause symptoms. The first, upper number (90 in this example) is the pressure when the heart beats and pushes blood out to the rest of the body. The second, lower number (60 in this example) is the pressure when the heart rests between beats. Low blood pressure is less common than high blood pressure.  Normal resting blood pressure ranges up to 120/80 ( 120 over 80\").  Borderline high blood pressure is 120/80 or higher but less than 140/90.  High blood pressure is 140/90 or higher for most people. If you have chronic kidney disease, 130/80 or higher is considered high blood pressure.  Why is high blood pressure a problem?  Over time, if your blood pressure rises and stays high, it can damage your blood vessels, heart, brain, kidneys, and eyes even though you may have no symptoms. The higher your blood pressure is, the more it increases your risk of heart attack, stroke, and other serious medical problems.  If you have high blood pressure, lowering it and keeping it normal can help prevent a heart attack or stroke. Keeping your blood pressure under control can help prevent long-term health problems as well, such as heart failure, kidney failure, and blindness.  How can I keep my blood pressure at the right level?  If your blood pressure is too high, your provider may recommend lifestyle changes to help " you lower your blood pressure, such as:  Lose excess weight. If you are overweight, losing even 10 pounds can lower your blood pressure.  Use less salt (sodium) in your food. Check the levels of sodium listed on food labels. Most of the sodium you eat may be hidden in processed foods such as chips, crackers, canned or boxed foods, fast food, or restaurant food.  Follow a healthy eating plan that is low in saturated fat, cholesterol, and processed foods. Include lots of fruits, vegetables, and fat-free or low-fat milk and milk products. Eat only enough calories to reach or keep a healthy weight. Ask your healthcare provider about how many calories you should eat each day.  Be physically active. Your provider can give you a physical activity plan that tells you what kind of activity and how much is safe for you.  Find ways to relax and to manage stress.  If you smoke, try to quit. Talk to your healthcare provider about ways to quit smoking. Smoking with high blood pressure raises the risk of heart attack and stroke.  If you want to drink alcohol, ask your healthcare provider how much is safe for you to drink.  Be careful with nonprescription medicines or herbal supplements. Some can raise blood pressure. This includes diet pills, cold and pain medicines, and energy drinks. Read labels or ask your pharmacist if the medicine or supplement affects blood pressure.  If lifestyle changes don t lower your blood pressure enough, your healthcare provider may prescribe one or more types of blood pressure medicine. Always follow your healthcare provider's instructions for taking medicine. Don't take more or less medicine or stop taking a medicine without talking to your provider first. It can be dangerous to stop taking certain blood pressure medicines suddenly.  If you have low blood pressure that is causing symptoms, after your healthcare provider has seen you, try these tips:  Don t skip meals. Eat a healthy diet.  Avoid  being out in the heat for a long time or being too active without drinking enough liquids.  Drink plenty of liquids every day, especially in hot weather or when outside.  If you have been lying down, sit for a moment before standing up, and then stand up slowly. Stand a moment before walking. Walk in place briefly while pulling in your stomach muscles several times. (This helps the return of blood flow from the legs.)  If your blood pressure is normal, check it at least once a year. Your provider may recommend checking your blood pressure at home between checkups.  Developed by [employer].  Adult Advisor 2016.2 published by [employer].  Last modified: 2016-04-21  Last reviewed: 2016-04-15  This content is reviewed periodically and is subject to change as new health information becomes available. The information is intended to inform and educate and is not a replacement for medical evaluation, advice, diagnosis or treatment by a healthcare professional.  References   Adult Advisor 2016.2 Index    Copyright   2016 [employer], a division of [employer] All rights reserved.

## 2021-12-09 NOTE — ED NOTES
EMS Arrival Note  ________________________________  Dhiraj De La Cruz is a 76 year old Male [facility]  Pre hospital clinical presentation per EMS personnel includes refusing to transfer and stating dentist needs to pull it out. Staff arrived stating pt more agitated and refusing to transfer. Did state dentist needs to pull it out   Pre hospital personnel report vital signs of:  B/P 161/85; ,  RR 26  Patient arrives with:  GCS Total = 15  Airway intact  Breathing Assessment Normal  Circulation Assessment Normal  Patient arrives with a 18 gauge IV at her right wrist   Placed in [id number], gowned, warm blanket provided, side rails up,  ID verified and band placed, and call light within reach.       Previous living situation [facility]

## 2021-12-09 NOTE — ED PROVIDER NOTES
History     Chief Complaint   Patient presents with     Oral Swelling     HPI  Dhiraj De La Cruz is a 76 year old male who presents to the emergency department for evaluation of some different behavior than they are normally used to. He is afebrile staff stated that he is was agitated and refusing to transfer to the chair    He states that he did need to see a dentist. Is otherwise doing well he does not have any other concerns at this time.    Allergies:  No Known Allergies    Problem List:    Patient Active Problem List    Diagnosis Date Noted     Behavior problem, adult 11/20/2018     Priority: Medium     Aortic aneurysm (H) 02/12/2018     Priority: Medium     Overview:   history of       Edema 02/12/2018     Priority: Medium     Gastritis 02/12/2018     Priority: Medium     Overview:   nonsteriodal induced gastritis with GI bleed       Glaucoma 02/12/2018     Priority: Medium     Inguinal hernia, left 02/12/2018     Priority: Medium     Marfan's syndrome 02/12/2018     Priority: Medium     Mental retardation 02/12/2018     Priority: Medium     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     Priority: Medium     Venous (peripheral) insufficiency 02/12/2018     Priority: Medium     Overview:   legs       Urinary incontinence 02/12/2018     Priority: Medium     ACP (advance care planning) 12/12/2013     Priority: Medium     Gout 12/09/2013     Priority: Medium     Abnormal glucose 08/10/2010     Priority: Medium     Constipation 07/22/2010     Priority: Medium        Past Medical History:    Past Medical History:   Diagnosis Date     Acute pancreatitis without infection or necrosis      Aortic aneurysm, intrathoracic (H)      Chronic constipation      Gastritis without bleeding      Glaucoma      Gout      Hyperglycemia      Intellectual disability      Marfan's syndrome      Other specified disorders of veins      Situs inversus      Urinary incontinence        Past Surgical History:    Past Surgical History:   Procedure Laterality  "Date     2010       Family History:    History reviewed. No pertinent family history.    Social History:  Marital Status:  Single [1]  Social History     Tobacco Use     Smoking status: Former Smoker     Packs/day: 0.00     Quit date: 2004     Years since quittin.8     Smokeless tobacco: Never Used   Substance Use Topics     Alcohol use: No     Drug use: Never        Medications:    doxycycline monohydrate (MONODOX) 100 MG capsule  polyethylene glycol (MIRALAX) 17 GM/Dose powder  allopurinol (ZYLOPRIM) 300 MG tablet  AMITIZA 24 MCG capsule  busPIRone (BUSPAR) 5 MG tablet  docusate sodium (DOK) 100 MG capsule  furosemide (LASIX) 40 MG tablet  haloperidol (HALDOL) 5 MG tablet  Incontinence Supply Disposable MISC  latanoprost (XALATAN) 0.005 % ophthalmic solution  LORazepam (ATIVAN) 2 MG tablet  OLANZapine (ZYPREXA) 5 MG tablet  omeprazole (PRILOSEC) 20 MG DR capsule  potassium chloride ER (K-TAB/KLOR-CON) 10 MEQ CR tablet  psyllium 30.9 % POWD  sennosides (SENOKOT) 8.6 MG tablet  SM PAIN RELIEVER EX  MG tablet  tamsulosin (FLOMAX) 0.4 MG capsule  timolol (TIMOPTIC-XE) 0.5 % ophthalmic gel-form          Review of Systems   Please see HPI for pertinent positives and negatives.  All other systems reviewed and found to be negative.      Physical Exam   BP: 128/85  Pulse: 117  Temp: 98  F (36.7  C)  Resp: 26  Height: 185.4 cm (6' 1\")  Weight: 73.3 kg (161 lb 9.6 oz)  SpO2: 99 %      Physical Exam   Exam:  Constitutional: healthy, alert and no distress  Head: Normocephalic.  Neck: Neck supple. No adenopathy.    ENT: Oropharynx examination mentation is poor but I do not see any lingual or buccal side abscess submandibular space is otherwise unremarkable there is no drooling.  Cardiovascular:  RRR. No murmurs, clicks gallops or rub  Respiratory:   Lungs clear  Gastrointestinal: Abdomen soft, non-tender. BS normal. No masses, organomegaly  : Deferred  Musculoskeletal: extremities normal- no " gross deformities noted, gait normal and normal muscle tone  Skin: no suspicious lesions or rashes  Neurologic: Gait normal. Reflexes normal and symmetric. Sensation grossly WNL.  Psychiatric: At baseline    ED Course             Results for orders placed or performed during the hospital encounter of 12/09/21 (from the past 24 hour(s))   Lactic acid whole blood STAT   Result Value Ref Range    Lactic Acid 3.4 (H) 0.7 - 2.0 mmol/L   CT Facial Bones without Contrast    Narrative    CT HEAD W/O CONTRAST, CT FACIAL BONES WITHOUT CONTRAST, 12/9/2021 4:34  PM    History: Male, age 76 years; face/head pain    Comparison: Head CT 3/6/2017    Technique:  Head CT technique: CT scan was performed of the head/brain without  contrast. Sagittal, coronal and axial reconstructions were reviewed.    Facial bone technique: CT was performed of the patient or bones.  Sagittal, coronal, and axial reconstructions were reviewed.    Findings:  Head CT: The ventricles and sulci are mildly prominent. The gray and  white matter demonstrate scattered areas of decreased density. The  bony structures demonstrate no fracture. Paranasal sinuses are normal.    Facial bone CT: The  bones of the face demonstrate no evidence of an  acute or healing fracture. There is minimal mucosal thickening of the  left maxillary sinus. The mandible is intact. Temporal mandibular  joints are congruent. No evidence of apparent lucency surrounding the  roots of the remaining teeth of the maxilla or mandible. Soft tissues  of the face demonstrate no evidence of lymphadenopathy. No evidence of  apparent inflammatory process. Parotid glands are grossly normal.  Moderate to moderately severe degenerative changes are seen within the  visualized portions of the cervical spine.        Impression    IMPRESSION:   Head CT: No evidence of acute intracranial abnormality. Mild atrophy.    Facial bone CT: No evidence of acute or subacute bony abnormality.    No evidence of dental  abscess.    YADIEL GLOVER MD         SYSTEM ID:  [id number]   CT Head w/o Contrast    Narrative    CT HEAD W/O CONTRAST, CT FACIAL BONES WITHOUT CONTRAST, 12/9/2021 4:34  PM    History: Male, age 76 years; face/head pain    Comparison: Head CT 3/6/2017    Technique:  Head CT technique: CT scan was performed of the head/brain without  contrast. Sagittal, coronal and axial reconstructions were reviewed.    Facial bone technique: CT was performed of the patient or bones.  Sagittal, coronal, and axial reconstructions were reviewed.    Findings:  Head CT: The ventricles and sulci are mildly prominent. The gray and  white matter demonstrate scattered areas of decreased density. The  bony structures demonstrate no fracture. Paranasal sinuses are normal.    Facial bone CT: The  bones of the face demonstrate no evidence of an  acute or healing fracture. There is minimal mucosal thickening of the  left maxillary sinus. The mandible is intact. Temporal mandibular  joints are congruent. No evidence of apparent lucency surrounding the  roots of the remaining teeth of the maxilla or mandible. Soft tissues  of the face demonstrate no evidence of lymphadenopathy. No evidence of  apparent inflammatory process. Parotid glands are grossly normal.  Moderate to moderately severe degenerative changes are seen within the  visualized portions of the cervical spine.        Impression    IMPRESSION:   Head CT: No evidence of acute intracranial abnormality. Mild atrophy.    Facial bone CT: No evidence of acute or subacute bony abnormality.    No evidence of dental abscess.    YADIEL GLOVER MD         SYSTEM ID:  [id number]   CBC with platelets differential    Narrative    The following orders were created for panel order CBC with platelets differential.  Procedure                               Abnormality         Status                     ---------                               -----------         ------                     CBC with  platelets and d...[[id number]]  Abnormal            Final result                 Please view results for these tests on the individual orders.   CBC with platelets and differential   Result Value Ref Range    WBC Count 10.4 4.0 - 11.0 10e3/uL    RBC Count 4.96 4.40 - 5.90 10e6/uL    Hemoglobin 15.0 13.3 - 17.7 g/dL    Hematocrit 43.3 40.0 - 53.0 %    MCV 87 78 - 100 fL    MCH 30.2 26.5 - 33.0 pg    MCHC 34.6 31.5 - 36.5 g/dL    RDW 12.7 10.0 - 15.0 %    Platelet Count 275 150 - 450 10e3/uL    % Neutrophils 81 %    % Lymphocytes 12 %    % Monocytes 7 %    % Eosinophils 0 %    % Basophils 0 %    % Immature Granulocytes 0 %    NRBCs per 100 WBC 0 <1 /100    Absolute Neutrophils 8.4 (H) 1.6 - 8.3 10e3/uL    Absolute Lymphocytes 1.2 0.8 - 5.3 10e3/uL    Absolute Monocytes 0.8 0.0 - 1.3 10e3/uL    Absolute Eosinophils 0.0 0.0 - 0.7 10e3/uL    Absolute Basophils 0.0 0.0 - 0.2 10e3/uL    Absolute Immature Granulocytes 0.0 <=0.4 10e3/uL    Absolute NRBCs 0.0 10e3/uL   Basic metabolic panel   Result Value Ref Range    Sodium 136 134 - 144 mmol/L    Potassium 3.8 3.5 - 5.1 mmol/L    Chloride 101 98 - 107 mmol/L    Carbon Dioxide (CO2) 24 21 - 31 mmol/L    Anion Gap 11 3 - 14 mmol/L    Urea Nitrogen 14 7 - 25 mg/dL    Creatinine 0.89 0.70 - 1.30 mg/dL    Calcium 10.0 8.6 - 10.3 mg/dL    Glucose 159 (H) 70 - 105 mg/dL    GFR Estimate 84 >60 mL/min/1.73m2   Lactic acid whole blood   Result Value Ref Range    Lactic Acid 1.8 0.7 - 2.0 mmol/L   UA reflex to Microscopic   Result Value Ref Range    Color Urine Light Yellow Colorless, Straw, Light Yellow, Yellow    Appearance Urine Clear Clear    Glucose Urine Negative Negative mg/dL    Bilirubin Urine Negative Negative    Ketones Urine Negative Negative mg/dL    Specific Gravity Urine 1.008 1.000 - 1.030    Blood Urine Negative Negative    pH Urine 7.0 5.0 - 9.0    Protein Albumin Urine Negative Negative mg/dL    Urobilinogen Urine Normal Normal, 2.0 mg/dL    Nitrite Urine Negative  Negative    Leukocyte Esterase Urine Negative Negative    Narrative    Microscopic not indicated   XR Chest 1 View    Narrative    Procedure:XR CHEST 1 VIEW    Clinical history:Male, 76 years, eval    Technique: Single view was obtained.    Comparison: 3/20/2018    Findings: The cardiac silhouette is unchanged, difficult to  characterize secondary to patient positioning. The pulmonary  vasculature is normal.    The lungs demonstrate decreased volume of the right lung. Left lung  appears clear. Bony structures are unremarkable.      Impression    Impression:   Limited by patient positioning. Left lung is clear. There may be some  atelectasis and/or subtle pneumonia at the right lung base.    YADIEL GLOVER MD         SYSTEM ID:  [id number]   XR Abdomen 2 Views    Narrative    XR ABDOMEN 2VIEWS   12/9/2021 7:14 PM    History:Male,age  76 years, SMALL BOWEL OBSTRUCTION; eval    Comparison: None    FINDINGS: Two views are submitted. Large volume of stool is seen  within the proximal colon. There are number of gas-filled loops of  large and small bowel otherwise throughout the abdomen and pelvis. No  evidence of free air. Severe degenerative changes seen within the left  hip joint.      Impression    IMPRESSION:  Large volume of stool in the proximal colon.     No distinct evidence of obstruction or free air.    YADIEL GLOVER MD         SYSTEM ID:  [id number]         Medications   sodium chloride 0.9% infusion ( Intravenous New Bag 12/9/21 1638)   sodium chloride 0.9% infusion ( Intravenous New Bag 12/9/21 1738)   magnesium citrate solution 296 mL (has no administration in time range)       Assessments & Plan (with Medical Decision Making)     I have reviewed the nursing notes.    I have reviewed the findings, diagnosis, plan and need for follow up with the patient.  Differential diagnosis at this point includes: infection/sepsis, CNS lesions/bleed, atypical migraine, encephalopathy, hypoglycemia, metabolic  derangements, electrolyte disturbances, medication effects, substance abuse, seizure, CVA-TIA, dementia-delirium, acute blood loss, as well as other etiologies.    Pleasant gentleman who presents to the emergency department for evaluation with the above concerns.  He was fluid resuscitated here and his lactic acid dropped down to a normal level of 1.8.  His white count is normal.  He had CT imaging that was unremarkable he will be discharged home with close follow-up if symptoms worsen he will return to the emergency department.  I explained my diagnostic considerations and recommendations and the patient voiced an understanding and was in agreement with the treatment plan. All questions were answered. We discussed potential side effects of any prescribed or recommended therapies, as well as expectations for response to treatments.    New Prescriptions    DOXYCYCLINE MONOHYDRATE (MONODOX) 100 MG CAPSULE    Take 1 capsule (100 mg) by mouth 2 times daily    POLYETHYLENE GLYCOL (MIRALAX) 17 GM/DOSE POWDER    Take 17 g (1 capful) by mouth daily       Final diagnoses:   Dehydration   Constipation, unspecified constipation type     Addendum:  Initial presentation was with some concern of some oral mentation trouble however that was unfounded. It was brought to my attention by the staff member with the patient. She advised the patient's nurse would like further work with x-ray evaluation of the abdomen.  Patient is moving his bowels and has been having appropriate bowel movements. He is comfortable and at this time he is in no acute distress. His abdomen is soft and nontender.He will have a cath urine specimen at last visit his urinalysis was essentially unremarkable except some mild dehydration which again today he appears dehydrated given his lactic acidosis and hypovolemic state.  It does not sound as though at home he is hydrating appropriately.  Throughout the emergency department course the patient has not exhibited  "any behavioral concerns.  He has been resting comfortably.  Vital signs have been stable.  Imaging of the chest shows there maybe atelectasis versus pneumonia but clinically less likely pneumonia given the fact that the patient is afebrile he is not hypoxic and his white count is normal. I do believe that this is more consistent with an atelectasis presentation. This can be monitored on an outpatient basis with repeat chest xray. Doxycycline will be started.  The patient is also found to have a large amount of stool in the colon.  With this in mind he will given mag citrate increase fiber diet return if symptoms worsen.    Vitals:    12/09/21 1527 12/09/21 1600 12/09/21 1734 12/09/21 1800   BP: 128/85  139/82 (!) 144/82   Pulse: 117 93 103 93   Resp: 26 8 25 25   Temp: 98  F (36.7  C)      TempSrc: Tympanic      SpO2: 99% 94% 98% 96%   Weight: 73.3 kg (161 lb 9.6 oz)      Height: 1.854 m (6' 1\")            12/9/2021   [facility]     Arvin Olson PA-C  12/09/21 1745       Arvin Olson PA-C  12/09/21 1941    "

## 2021-12-13 ENCOUNTER — OFFICE VISIT (OUTPATIENT)
Dept: INTERNAL MEDICINE | Facility: OTHER | Age: 76
End: 2021-12-13
Attending: INTERNAL MEDICINE
Payer: COMMERCIAL

## 2021-12-13 VITALS — RESPIRATION RATE: 16 BRPM | DIASTOLIC BLOOD PRESSURE: 72 MMHG | SYSTOLIC BLOOD PRESSURE: 130 MMHG | TEMPERATURE: 98 F

## 2021-12-13 DIAGNOSIS — Q87.40 MARFAN'S SYNDROME: ICD-10-CM

## 2021-12-13 DIAGNOSIS — R46.89 BEHAVIOR CONCERN: Primary | ICD-10-CM

## 2021-12-13 DIAGNOSIS — R60.0 PERIPHERAL EDEMA: ICD-10-CM

## 2021-12-13 PROCEDURE — 99214 OFFICE O/P EST MOD 30 MIN: CPT | Performed by: INTERNAL MEDICINE

## 2021-12-13 PROCEDURE — G0463 HOSPITAL OUTPT CLINIC VISIT: HCPCS

## 2021-12-13 RX ORDER — TIMOLOL MALEATE 5 MG/ML
SOLUTION/ DROPS OPHTHALMIC
COMMUNITY
Start: 2021-08-05 | End: 2021-12-13

## 2021-12-13 RX ORDER — FUROSEMIDE 40 MG
TABLET ORAL
Qty: 100 TABLET | Refills: 3 | Status: SHIPPED | OUTPATIENT
Start: 2021-12-13 | End: 2023-01-19

## 2021-12-13 RX ORDER — BUSPIRONE HYDROCHLORIDE 7.5 MG/1
7.5 TABLET ORAL 3 TIMES DAILY
Qty: 90 TABLET | Refills: 11 | Status: SHIPPED | OUTPATIENT
Start: 2021-12-13 | End: 2022-10-19

## 2021-12-13 NOTE — PROGRESS NOTES
Chief Complaint: Follow-up on behaviors.    HPI: He is brought in today by his facility.  He has been having more behavioral issues where he will not cooperate to get up or to go to work or to be toileted.  This has been over the past few weeks.  He has been to the emergency room, most recently just last week at which time there was a concern that maybe he had constipation and/or pneumonia and/or some dehydration.  I reviewed all of his lab, x-ray and the emergency room notes.  I am not convinced that there are any particular medical problems occurring at this time.  The patient has not seemed to have any medical issues per the staff, just the behavioral issues.  He is here today to review this and decide what to do going forward.    Medications are reconciled.    Past Medical History:   Diagnosis Date     Acute pancreatitis without infection or necrosis     Recurrent     Aortic aneurysm, intrathoracic (H)      Chronic constipation      Gastritis without bleeding     Nonsteroidal induced gastritis with GI bleed     Glaucoma      Gout     12/9/2013     Hyperglycemia      Intellectual disability     No Comments Provided     Marfan's syndrome     No Comments Provided     Other specified disorders of veins     Chronic venous stasis     Situs inversus     No Comments Provided     Urinary incontinence     No Comments Provided       Past Surgical History:   Procedure Laterality Date     CHOLECYSTECTOMY  2010       No Known Allergies    Current Outpatient Medications   Medication Sig Dispense Refill     allopurinol (ZYLOPRIM) 300 MG tablet TAKE 1 TABLET (300 MG) BY MOUTH DAILY IN THE AM. 90 tablet 3     AMITIZA 24 MCG capsule TAKE 1 CAPSULE (24 MCG) BY MOUTH DAILY (WITH BREAKFAST) *MUST BE BOTTLE FILLED* 90 capsule 1     busPIRone (BUSPAR) 7.5 MG tablet Take 1 tablet (7.5 mg) by mouth 3 times daily 90 tablet 11     docusate sodium (DOK) 100 MG capsule TAKE 1 CAPSULE (100 MG) BY MOUTH 2 TIMES DAILY. 180 capsule 0      furosemide (LASIX) 40 MG tablet TAKE 1 TAB BY MOUTH ON E THR SAT  tablet 3     haloperidol (HALDOL) 5 MG tablet Prior to appointment prn 15 tablet 1     Incontinence Supply Disposable MISC ATTENDS ADV UDWR SZ:LARGE PC #[id number] 18/PP DX:N39.41 [facility]:  144EA/8PKGS/29DS 150 each 10     LORazepam (ATIVAN) 2 MG tablet Prior to appointment prn 15 tablet 1     OLANZapine (ZYPREXA) 5 MG tablet TAKE 1 TABLET BY MOUTH DAILY AT 8 PM 90 tablet 3     omeprazole (PRILOSEC) 20 MG DR capsule TAKE 1 CAPSULE BY MOUTH DAILY 180 capsule 3     polyethylene glycol (MIRALAX) 17 GM/Dose powder Take 17 g (1 capful) by mouth daily 510 g 0     potassium chloride ER (K-TAB/KLOR-CON) 10 MEQ CR tablet TAKE 1 TABLET (10 MEQ) BY MOUTH DAILY 90 tablet 3     psyllium 30.9 % POWD Take 1 Tablespoonful by mouth daily Mix in 8 ounces of water or orange juice. 1 Bottle 6     sennosides (SENOKOT) 8.6 MG tablet Take 1 tablet by mouth 2 times daily 180 tablet 0     SM PAIN RELIEVER EX  MG tablet TAKE 2 TABS (1,000MG) BY MOUTH TWICE A DAY N[facility]#[id number] (REDTABSONLY) 360 tablet 0     tamsulosin (FLOMAX) 0.4 MG capsule TAKE 1 CAPSULE (0.4 MG) BY MOUTH DAILY 90 capsule 0       Social History     Socioeconomic History     Marital status: Single     Spouse name: Not on file     Number of children: Not on file     Years of education: Not on file     Highest education level: Not on file   Occupational History     Not on file   Tobacco Use     Smoking status: Former Smoker     Packs/day: 0.00     Quit date: 2004     Years since quittin.8     Smokeless tobacco: Never Used   Vaping Use     Vaping Use: Never used   Substance and Sexual Activity     Alcohol use: No     Drug use: Never     Sexual activity: Not Currently   Other Topics Concern     Not on file   Social History Narrative    Patient is a long term resident of the [facility].  Legal guardian is his sister, Jessica Altamirano.  Code status is DNR--therapeutic.     Social  Determinants of Health     Financial Resource Strain: Not on file   Food Insecurity: Not on file   Transportation Needs: Not on file   Physical Activity: Not on file   Stress: Not on file   Social Connections: Not on file   Intimate Partner Violence: Not on file   Housing Stability: Not on file       Review of Systems   Unable to perform ROS: Patient nonverbal       Physical Exam  Vitals reviewed.   Constitutional:       General: He is not in acute distress.     Appearance: Normal appearance. He is not ill-appearing or diaphoretic.      Comments: In wheelchair   Cardiovascular:      Rate and Rhythm: Normal rate and regular rhythm.      Heart sounds: Normal heart sounds.   Pulmonary:      Effort: Pulmonary effort is normal.      Breath sounds: Normal breath sounds.   Abdominal:      General: There is no distension.      Palpations: Abdomen is soft.      Tenderness: There is no abdominal tenderness. There is no guarding.   Musculoskeletal:      Right lower leg: No edema.      Left lower leg: No edema.   Skin:     General: Skin is warm and dry.   Neurological:      Mental Status: He is alert. Mental status is at baseline.         Assessment:      ICD-10-CM    1. Behavior concern  R46.89    2. Peripheral edema  R60.9 furosemide (LASIX) 40 MG tablet   3. Marfan's syndrome  Q87.40 busPIRone (BUSPAR) 7.5 MG tablet       Plan: Discussion with the staff today.  I am not convinced there is a medical problem causing his concerns.  I do not think he has pneumonia based on his x-ray and clinically he does not have a pneumonia.  I will discontinue his doxycycline.  I elected to increase his BuSpar from 5 mg 3 times daily to 7.5 mg 3 times daily to see if this provides any improvement in his behavioral issues.  They can also use the lorazepam 2 mg if needed on a as needed basis.  Decrease furosemide to 1 tablet 4 times weekly rather than the 2 tablets 4 times weekly.  Monitor for edema, we can always up the dose again if needed.   Follow-up will be dependent on his clinical response to the above changes.

## 2021-12-13 NOTE — NURSING NOTE
"Coming in for a f/u on ER visits    Talk about instructions on care    Chief Complaint   Patient presents with     Patient Request     was in the ER twice and wants instructions on care level       Initial /72   Temp 98  F (36.7  C)   Resp 16  Estimated body mass index is 21.32 kg/m  as calculated from the following:    Height as of 12/9/21: 1.854 m (6' 1\").    Weight as of 12/9/21: 73.3 kg (161 lb 9.6 oz).  Medication Reconciliation: complete.  FOOD SECURITY SCREENING QUESTIONS  Hunger Vital Signs:  Within the past 12 months we worried whether our food would run out before we got money to buy more. Never  Within the past 12 months the food we bought just didn't last and we didn't have money to get more. Never  Latricia Brock LPN 12/13/2021 11:22 AM      Latricia Brock LPN  "

## 2021-12-14 DIAGNOSIS — K59.00 CONSTIPATION, UNSPECIFIED CONSTIPATION TYPE: ICD-10-CM

## 2021-12-14 DIAGNOSIS — M19.90 OSTEOARTHRITIS, UNSPECIFIED OSTEOARTHRITIS TYPE, UNSPECIFIED SITE: ICD-10-CM

## 2021-12-14 DIAGNOSIS — F69 BEHAVIOR PROBLEM, ADULT: ICD-10-CM

## 2021-12-14 DIAGNOSIS — R52 PAIN: ICD-10-CM

## 2021-12-15 DIAGNOSIS — K59.00 CONSTIPATION, UNSPECIFIED CONSTIPATION TYPE: Primary | ICD-10-CM

## 2021-12-15 LAB
BACTERIA BLD CULT: NO GROWTH
BACTERIA BLD CULT: NO GROWTH

## 2021-12-16 RX ORDER — POLYETHYLENE GLYCOL 3350 17 G/17G
1 POWDER, FOR SOLUTION ORAL DAILY
Qty: 510 G | Refills: 0 | Status: SHIPPED | OUTPATIENT
Start: 2021-12-16 | End: 2022-04-27

## 2021-12-16 RX ORDER — SENNOSIDES 8.6 MG
TABLET ORAL
Qty: 180 TABLET | Refills: 0 | Status: SHIPPED | OUTPATIENT
Start: 2021-12-16 | End: 2022-03-16

## 2021-12-16 RX ORDER — ACETAMINOPHEN 500 MG/1
TABLET ORAL
Qty: 360 TABLET | Refills: 0 | Status: SHIPPED | OUTPATIENT
Start: 2021-12-16 | End: 2022-03-16

## 2021-12-16 RX ORDER — LORAZEPAM 2 MG/1
TABLET ORAL
Qty: 15 TABLET | Refills: 1 | Status: SHIPPED | OUTPATIENT
Start: 2021-12-16 | End: 2022-02-28

## 2021-12-21 DIAGNOSIS — K29.60 OTHER GASTRITIS WITHOUT HEMORRHAGE, UNSPECIFIED CHRONICITY: ICD-10-CM

## 2021-12-21 DIAGNOSIS — K59.00 CONSTIPATION, UNSPECIFIED CONSTIPATION TYPE: ICD-10-CM

## 2021-12-21 DIAGNOSIS — N39.41 URGE INCONTINENCE OF URINE: ICD-10-CM

## 2021-12-23 RX ORDER — TAMSULOSIN HYDROCHLORIDE 0.4 MG/1
0.4 CAPSULE ORAL DAILY
Qty: 90 CAPSULE | Refills: 0 | Status: SHIPPED | OUTPATIENT
Start: 2021-12-23 | End: 2022-02-23

## 2021-12-23 RX ORDER — DOCUSATE SODIUM 100 MG/1
CAPSULE, LIQUID FILLED ORAL
Qty: 180 CAPSULE | Refills: 0 | Status: SHIPPED | OUTPATIENT
Start: 2021-12-23 | End: 2022-02-23

## 2022-01-14 DIAGNOSIS — K59.03 DRUG-INDUCED CONSTIPATION: ICD-10-CM

## 2022-01-17 RX ORDER — LUBIPROSTONE 24 UG/1
CAPSULE, GELATIN COATED ORAL
Qty: 90 CAPSULE | Refills: 1 | Status: SHIPPED | OUTPATIENT
Start: 2022-01-17 | End: 2022-07-20

## 2022-01-17 NOTE — TELEPHONE ENCOUNTER
AMITIZA 24 MCG capsule  Last Written Prescription Date: 07/07/2021  Last Fill Quantity: 90,   # refills: 1  Last Office Visit: 12/13/2021  Future Office visit:       Routing refill request to provider for review/approval because:  Drug not on the [facility], P or [facility] refill protocol or controlled substance      Unable to complete prescription refill per RNMedication Refill Policy.................... Juliane Pack RN ....................  1/17/2022   11:58 AM

## 2022-01-20 DIAGNOSIS — R60.9 EDEMA, UNSPECIFIED TYPE: ICD-10-CM

## 2022-01-21 RX ORDER — POTASSIUM CHLORIDE 750 MG/1
10 TABLET, EXTENDED RELEASE ORAL DAILY
Qty: 90 TABLET | Refills: 3 | Status: SHIPPED | OUTPATIENT
Start: 2022-01-21 | End: 2022-09-06

## 2022-01-21 NOTE — TELEPHONE ENCOUNTER
Requested Prescriptions   Pending Prescriptions Disp Refills     potassium chloride ER (K-TAB/KLOR-CON) 10 MEQ CR tablet 90 tablet 3     Sig: Take 1 tablet (10 mEq) by mouth daily   Last Prescription Date:   3/18/20  Last Fill Qty/Refills:         90, R-3  Last Office Visit:              12/13/21   Future Office visit:           None  Routing refill request to provider for review/approval because:  Gap in medication    In clinical absence of patient's primary, Armaan Gallego, patient is requesting that this message be sent to the covering provider for consideration please.    Unable to complete prescription refill per RN Medication Refill Policy. Pooja Carlos RN .............. 1/21/2022  10:13 AM

## 2022-01-31 ENCOUNTER — ALLIED HEALTH/NURSE VISIT (OUTPATIENT)
Dept: FAMILY MEDICINE | Facility: OTHER | Age: 77
End: 2022-01-31
Attending: FAMILY MEDICINE
Payer: COMMERCIAL

## 2022-01-31 DIAGNOSIS — R05.9 COUGH: ICD-10-CM

## 2022-01-31 DIAGNOSIS — Z20.822 EXPOSURE TO 2019 NOVEL CORONAVIRUS: Primary | ICD-10-CM

## 2022-01-31 LAB — SARS-COV-2 RNA RESP QL NAA+PROBE: NORMAL

## 2022-01-31 PROCEDURE — C9803 HOPD COVID-19 SPEC COLLECT: HCPCS

## 2022-01-31 PROCEDURE — U0005 INFEC AGEN DETEC AMPLI PROBE: HCPCS | Mod: ZL

## 2022-02-01 ENCOUNTER — TELEPHONE (OUTPATIENT)
Dept: NURSING | Facility: CLINIC | Age: 77
End: 2022-02-01
Payer: COMMERCIAL

## 2022-02-01 LAB — SARS-COV-2 RNA RESP QL NAA+PROBE: DETECTED

## 2022-02-01 NOTE — TELEPHONE ENCOUNTER
"Coronavirus (COVID-19) Notification    Caller Name (Patient, parent, daughter/son, grandparent, etc)  Staff Jaz    Reason for call  Notify of Positive Coronavirus (COVID-19) lab results, assess symptoms,  review [facility] recommendations    Lab Result    Lab test:  2019-nCoV rRt-PCR or SARS-CoV-2 PCR    Oropharyngeal AND/OR nasopharyngeal swabs is POSITIVE for 2019-nCoV RNA/SARS-COV-2 PCR (COVID-19 virus)    RN Recommendations/Instructions per [facility] Coronavirus COVID-19 recommendations    Brief introduction script  Introduce self then review script:  \"I am calling on behalf of [employer].  We were notified that your Coronavirus test (COVID-19) for was POSITIVE for the virus.  I have some information to relay to you but first I wanted to mention that the [place] Dept of Health will be contacting you shortly [it's possible MD already called Patient] to talk to you more about how you are feeling and other people you have had contact with who might now also have the virus.  Also, [facility] is Partnering with the [facility] for Covid-19 research, you may be contacted directly by research staff.\"      Assessment (Inquire about Patient's current symptoms)   Assessment   Current Symptoms at time of phone call: (if no symptoms, document No symptoms] Slight cough   Date of symptom(s) onset (if applicable) 01/27/22     If at time of call, Patients symptoms hare worsened, the Patient should contact 911 or have someone drive them to Emergency Dept promptly:      If Patient calling 911, inform 911 personal that you have tested positive for the Coronavirus (COVID-19).  Place mask on and await 911 to arrive.    If Emergency Dept, If possible, please have another adult drive you to the Emergency Dept but you need to wear mask when in contact with other people.          Treatment Options:   Patient classified as COVID treatment eligible by Epic high risk algorithm: No  You may be eligible to " receive a new treatment with a monoclonal antibody for preventing hospitalization in patients at high risk for complications from COVID-19.  This medication is still experimental and available on a limited basis; it is given through an IV and must be given at an infusion center.  Please note that not all people who are eligible will receive the medication since it is in limited supply.  Are you interested in being considered for this medication?  No.     Review information with Patient    Your result was positive. This means you have COVID-19 (coronavirus).  We have sent you a letter that reviews the information that I'll be reviewing with you now.    How can I protect others?    If you have symptoms: stay home and away from others (self-isolate) until:    You've had no fever--and no medicine that reduces fever--for 1 full day (24 hours). And       Your other symptoms have gotten better. For example, your cough or breathing has improved. And     At least 10 days have passed since your symptoms started. (If you've been told by a doctor that you have a weak immune system, wait 20 days.)     If you don't have symptoms: Stay home and away from others (self-isolate) until at least 10 days have passed since your first positive COVID-19 test. (Date test collected)    During this time:    Stay in your own room, including for meals. Use your own bathroom if you can.    Stay away from others in your home. No hugging, kissing or shaking hands. No visitors.     Don't go to work, school or anywhere else.     Clean  high touch  surfaces often (doorknobs, counters, handles, etc.). Use a household cleaning spray or wipes. You'll find a full list on the EPA website at www.epa.gov/pesticide-registration/list-n-disinfectants-use-against-sars-cov-2.     Cover your mouth and nose with a mask, tissue or other face covering to avoid spreading germs.    Wash your hands and face often with soap and water.    Make a list of people you have  been in close contact with recently, even if either of you wore a face covering.   - Start your list from 2 days before you became ill or had a positive test.  - Include anyone that was within 6 feet of you for a cumulative total of 15 minutes or more in 24 hours. (Example: if you sat next to Chace for 5 minutes in the morning and 10 minutes in the afternoon, then you were in close contact for 15 minutes total that day. Chace would be added to your list.)    A public health worker will call or text you. It is important that you answer. They will ask you questions about possible exposures to COVID-19, such as people you have been in direct contact with and places you have visited.    Tell the people on your list that you have COVID-19; they should stay away from others for 14 days starting from the last time they were in contact with you (unless you are told something different from a public health worker).     Caregivers in these groups are at risk for severe illness due to COVID-19:  o People 65 years and older  o People who live in a nursing home or long-term care facility  o People with chronic disease (lung, heart, cancer, diabetes, kidney, liver, immunologic)  o People who have a weakened immune system, including those who:  - Are in cancer treatment  - Take medicine that weakens the immune system, such as corticosteroids  - Had a bone marrow or organ transplant  - Have an immune deficiency  - Have poorly controlled HIV or AIDS  - Are obese (body mass index of 40 or higher)  - Smoke regularly    Caregivers should wear gloves while washing dishes, handling laundry and cleaning bedrooms and bathrooms.    Wash and dry laundry with special caution. Don't shake dirty laundry, and use the warmest water setting you can.    If you have a weakened immune system, ask your doctor about other actions you should take.    For more tips, go to www.cdc.gov/coronavirus/2019-ncov/downloads/10Things.pdf.    You should not go back  to work until you meet the guidelines above for ending your home isolation. You don't need to be retested for COVID-19 before going back to work--studies show that you won't spread the virus if it's been at least 10 days since your symptoms started (or 20 days, if you have a weak immune system).    Employers: This document serves as formal notice of your employee's medical guidelines for going back to work. They must meet the above guidelines before going back to work in person.    How can I take care of myself?    1. Get lots of rest. Drink extra fluids (unless a doctor has told you not to).    2. Take Tylenol (acetaminophen) for fever or pain. If you have liver or kidney problems, ask your family doctor if it's okay to take Tylenol.     Take either:     650 mg (two 325 mg pills) every 4 to 6 hours, or     1,000 mg (two 500 mg pills) every 8 hours as needed.     Note: Don't take more than 3,000 mg in one day. Acetaminophen is found in many medicines (both prescribed and over-the-counter medicines). Read all labels to be sure you don't take too much.    For children, check the Tylenol bottle for the right dose (based on their age or weight).    3. If you have other health problems (like cancer, heart failure, an organ transplant or severe kidney disease): Call your specialty clinic if you don't feel better in the next 2 days.    4. Know when to call 911: Emergency warning signs include:    Trouble breathing or shortness of breath    Pain or pressure in the chest that doesn't go away    Feeling confused like you haven't felt before, or not being able to wake up    Bluish-colored lips or face    5. Sign up for [employer]. We know it's scary to hear that you have COVID-19. We want to track your symptoms to make sure you're okay over the next 2 weeks. Please look for an email from [employer]--this is a free, online program that we'll use to keep in touch. To sign up, follow the link in the email. Learn more at  [email or web address].    Where can I get more information?    [facility]: [email or web address]    Coronavirus Basics: www.health.[facility].[place]/diseases/coronavirus/basics.html    What to Do If You're Sick: www.cdc.gov/coronavirus/2019-ncov/about/steps-when-sick.html    Ending Home Isolation: www.cdc.gov/coronavirus/2019-ncov/hcp/disposition-in-home-patients.html     Caring for Someone with COVID-19: www.cdc.gov/coronavirus/2019-ncov/if-you-are-sick/care-for-someone.html     [facility] clinical trials (COVID-19 research studies): [email or web address]-[email or web address]     A Positive COVID-19 letter will be sent via [employer] or the mail. (Exception, no letters sent to Presurgerical/Preprocedure Patients)    Latricia Munoz

## 2022-02-23 DIAGNOSIS — N39.41 URGE INCONTINENCE OF URINE: ICD-10-CM

## 2022-02-23 DIAGNOSIS — K59.00 CONSTIPATION, UNSPECIFIED CONSTIPATION TYPE: ICD-10-CM

## 2022-02-23 RX ORDER — TAMSULOSIN HYDROCHLORIDE 0.4 MG/1
0.4 CAPSULE ORAL DAILY
Qty: 90 CAPSULE | Refills: 0 | Status: SHIPPED | OUTPATIENT
Start: 2022-02-23 | End: 2022-04-20

## 2022-02-23 RX ORDER — DOCUSATE SODIUM 100 MG/1
CAPSULE, LIQUID FILLED ORAL
Qty: 180 CAPSULE | Refills: 0 | Status: SHIPPED | OUTPATIENT
Start: 2022-02-23 | End: 2022-04-20

## 2022-02-23 NOTE — TELEPHONE ENCOUNTER
"[id number] sent Rx request for the following:   docusate sodium (COLACE) 100 MG capsule  SigTAKE 1 CAPSULE (100 MG) BY MOUTH 2 TIMES DAILY.    Last Prescription Date:   12/23/2021  Last Fill Qty/Refills:         180, R-0    Laxatives Protocol Passed - 2/23/2022 10:46 AM       Passed - Patient is age 6 or older       Passed - Recent (12 mo) or future (30 days) visit within the authorizing provider's specialty    Patient has had an office visit with the authorizing provider or a provider within the authorizing providers department within the previous 12 mos or has a future within next 30 days. See \"Patient Info\" tab in inbasket, or \"Choose Columns\" in Meds & Orders section of the refill encounter.             Passed - Medication is active on med list     tamsulosin (FLOMAX) 0.4 MG capsule  Sig: TAKE 1 CAPSULE (0.4 MG) BY MOUTH DAILY    Last Prescription Date:   12/23/2021  Last Fill Qty/Refills:         90, R-0    Alpha Blockers Passed - 2/23/2022 10:46 AM       Passed - Blood pressure under 140/90 in past 12 months    BP Readings from Last 3 Encounters:   12/13/21 130/72   12/09/21 136/76   11/26/21 126/72                Passed - Recent (12 mo) or future (30 days) visit within the authorizing provider's specialty    Patient has had an office visit with the authorizing provider or a provider within the authorizing providers department within the previous 12 mos or has a future within next 30 days. See \"Patient Info\" tab in inbasket, or \"Choose Columns\" in Meds & Orders section of the refill encounter.             Passed - Patient does not have Tadalafil, Vardenafil, or Sildenafil on their medication list       Passed - Medication is active on med list       Passed - Patient is 18 years of age or older       Last Office Visit:              12/13/2021 ([facility])   Future Office visit:           None noted    Unable to complete prescription refill per RN Medication Refill Policy.................... " Christine Shane RN ....................  2/23/2022   12:17 PM

## 2022-02-24 DIAGNOSIS — F69 BEHAVIOR PROBLEM, ADULT: ICD-10-CM

## 2022-02-28 RX ORDER — LORAZEPAM 2 MG/1
TABLET ORAL
Qty: 15 TABLET | Refills: 1 | Status: SHIPPED | OUTPATIENT
Start: 2022-02-28 | End: 2022-03-09

## 2022-03-08 DIAGNOSIS — F69 BEHAVIOR PROBLEM, ADULT: ICD-10-CM

## 2022-03-09 RX ORDER — LORAZEPAM 2 MG/1
TABLET ORAL
Qty: 1 TABLET | Refills: 0 | Status: SHIPPED | OUTPATIENT
Start: 2022-03-09 | End: 2022-04-20

## 2022-03-09 NOTE — TELEPHONE ENCOUNTER
LORazepam (ATIVAN) 2 MG tablet  Last Written Prescription Date:02/28/2022  Last Fill Quantity: 15,   # refills: 1  Last Office Visit: 12/13/2021  Future Office visit:       Routing refill request to provider for review/approval.    Unable to complete prescription refill per RNMedication Refill Policy.................... Juliane Pack RN ....................  3/9/2022   3:41 PM          
Color consistent with ethnicity/race, warm, dry intact, resilient.

## 2022-03-15 DIAGNOSIS — K59.00 CONSTIPATION, UNSPECIFIED CONSTIPATION TYPE: ICD-10-CM

## 2022-03-15 DIAGNOSIS — M19.90 OSTEOARTHRITIS, UNSPECIFIED OSTEOARTHRITIS TYPE, UNSPECIFIED SITE: ICD-10-CM

## 2022-03-15 DIAGNOSIS — R52 PAIN: ICD-10-CM

## 2022-03-16 RX ORDER — SENNOSIDES 8.6 MG/1
TABLET ORAL
Qty: 180 TABLET | Refills: 1 | Status: SHIPPED | OUTPATIENT
Start: 2022-03-16 | End: 2022-04-28

## 2022-03-16 RX ORDER — ACETAMINOPHEN 500 MG/1
TABLET ORAL
Qty: 360 TABLET | Refills: 1 | Status: SHIPPED | OUTPATIENT
Start: 2022-03-16 | End: 2022-04-28

## 2022-03-16 NOTE — TELEPHONE ENCOUNTER
Routing refill request to provider for review/approval because:    LOV: 12/13/2021  Chrissie Travis RN on 3/16/2022 at 12:20 PM

## 2022-03-22 DIAGNOSIS — M10.9 GOUT, UNSPECIFIED CAUSE, UNSPECIFIED CHRONICITY, UNSPECIFIED SITE: ICD-10-CM

## 2022-03-23 RX ORDER — ALLOPURINOL 300 MG/1
300 TABLET ORAL DAILY
Qty: 90 TABLET | Refills: 0 | Status: SHIPPED | OUTPATIENT
Start: 2022-03-23 | End: 2022-05-23

## 2022-03-23 NOTE — TELEPHONE ENCOUNTER
"Requested Prescriptions   Pending Prescriptions Disp Refills     allopurinol (ZYLOPRIM) 300 MG tablet [Pharmacy Med Name: ALLOPURINOL 300MG TABLET] 90 tablet 0     Sig: TAKE 1 TABLET (300 MG) BY MOUTH DAILY IN THE AM.       Gout Agents Protocol Failed - 3/22/2022 11:07 AM        Failed - Has Uric Acid on file in past 12 months and value is less than 6     Recent Labs   Lab Test 01/14/14  1122   URIC 4.0     If level is 6mg/dL or greater, ok to refill one time and refer to provider.           Passed - CBC on file in past 12 months     Recent Labs   Lab Test 12/09/21  1640 03/28/18  1804 10/09/17  1103   WBC 10.4   < >  --    GICHWBC  --   --  6.7   RBC 4.96   < >  --    GICHRBC  --   --  5.03   HGB 15.0   < > 15.5   HCT 43.3   < > 45.3      < > 235    < > = values in this interval not displayed.                 Passed - ALT on file in past 12 months     Recent Labs   Lab Test 11/01/21  1032 11/09/18  0944 10/09/17  1123   ALT 10   < >  --    GICHALT  --   --  14    < > = values in this interval not displayed.             Passed - Recent (12 mo) or future (30 days) visit within the authorizing provider's specialty     Patient has had an office visit with the authorizing provider or a provider within the authorizing providers department within the previous 12 mos or has a future within next 30 days. See \"Patient Info\" tab in inbasket, or \"Choose Columns\" in Meds & Orders section of the refill encounter.              Passed - Medication is active on med list        Passed - Patient is age 18 or older        Passed - Normal serum creatinine on file in the past 12 months     Recent Labs   Lab Test 12/09/21  1640   CR 0.89       Ok to refill medication if creatinine is low                Last Written Prescription Date:  01/20/2022  Last Fill Quantity: 90,   # refills: 0  Last Office Visit: 12/13/2021  Future Office visit:       Routing refill request to provider for review/approval.     Unable to complete " prescription refill per RNMedication Refill Policy.................... Juliane Pack RN ....................  3/23/2022   8:44 AM

## 2022-04-13 ENCOUNTER — MYC MEDICAL ADVICE (OUTPATIENT)
Dept: INTERNAL MEDICINE | Facility: OTHER | Age: 77
End: 2022-04-13
Payer: COMMERCIAL

## 2022-04-13 NOTE — TELEPHONE ENCOUNTER
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  If he is not uncomfortable and if he is still having reasonable bowel movements and using suppositories is reasonable, I would recommend nothing different.

## 2022-04-19 ENCOUNTER — MYC MEDICAL ADVICE (OUTPATIENT)
Dept: INTERNAL MEDICINE | Facility: OTHER | Age: 77
End: 2022-04-19
Payer: COMMERCIAL

## 2022-04-19 DIAGNOSIS — N39.41 URGE INCONTINENCE OF URINE: ICD-10-CM

## 2022-04-19 DIAGNOSIS — K59.00 CONSTIPATION, UNSPECIFIED CONSTIPATION TYPE: ICD-10-CM

## 2022-04-19 NOTE — TELEPHONE ENCOUNTER
I am fine with him stopping those medications.  I have no idea how to get other people onto his MyChart.

## 2022-04-20 DIAGNOSIS — F69 BEHAVIOR PROBLEM, ADULT: ICD-10-CM

## 2022-04-20 RX ORDER — LORAZEPAM 2 MG/1
TABLET ORAL
Qty: 1 TABLET | Refills: 0 | Status: SHIPPED | OUTPATIENT
Start: 2022-04-20 | End: 2022-05-03

## 2022-04-20 RX ORDER — DOCUSATE SODIUM 100 MG/1
CAPSULE, LIQUID FILLED ORAL
Qty: 180 CAPSULE | Refills: 0 | Status: SHIPPED | OUTPATIENT
Start: 2022-04-20 | End: 2022-04-28

## 2022-04-20 RX ORDER — TAMSULOSIN HYDROCHLORIDE 0.4 MG/1
0.4 CAPSULE ORAL DAILY
Qty: 90 CAPSULE | Refills: 0 | Status: SHIPPED | OUTPATIENT
Start: 2022-04-20 | End: 2022-06-21

## 2022-04-20 NOTE — TELEPHONE ENCOUNTER
LORAZEPAM 2MG TABLET        Last Written Prescription Date:  3/9/22  Last Fill Quantity: 1,   # refills: 0  Last Office Visit: 12/13/21  Future Office visit:       Routing refill request to provider for review/approval because:  Drug not on the [facility] or [facility] refill protocol or controlled substance. Unable to complete prescription refill per RNMedication Refill Policy.................... Carolee Fontanez RN ....................  4/20/2022   9:17 AM

## 2022-04-20 NOTE — TELEPHONE ENCOUNTER
[id number] sent Rx request for the following:      TAMSULOSIN 0.4MG CAPSULE      Last Prescription Date:   2/23/2022  Last Fill Qty/Refills:         90, R-0    Last Office Visit:              12/13/2021   Future Office visit:           none       DOCUSATE SODIUM 100MG SOFTGEL      Last Prescription Date:   2/23/2022  Last Fill Qty/Refills:         180, R-0        Franco Cloe RN, BSN  ....................  4/20/2022   10:09 AM

## 2022-04-21 ENCOUNTER — TELEPHONE (OUTPATIENT)
Dept: INTERNAL MEDICINE | Facility: OTHER | Age: 77
End: 2022-04-21
Payer: COMMERCIAL

## 2022-04-21 ENCOUNTER — MYC MEDICAL ADVICE (OUTPATIENT)
Dept: INTERNAL MEDICINE | Facility: OTHER | Age: 77
End: 2022-04-21
Payer: COMMERCIAL

## 2022-04-21 DIAGNOSIS — F69 BEHAVIOR PROBLEM, ADULT: ICD-10-CM

## 2022-04-21 RX ORDER — LORAZEPAM 2 MG/1
TABLET ORAL
Qty: 1 TABLET | Refills: 0 | OUTPATIENT
Start: 2022-04-21

## 2022-04-21 NOTE — TELEPHONE ENCOUNTER
[facility] has recently changed some of patients medications, [facility] is asking the updates get faxes to Nadeem [facility] as well.     Amparo Rasmussen on 4/21/2022 at 9:56 AM

## 2022-04-21 NOTE — TELEPHONE ENCOUNTER
LORazepam (ATIVAN) 2 MG tablet 1 tablet 0 4/20/2022  No   Sig: TAKE 1 TABLET BY MOUTH DAILY AS NEEDED PRIOR TO APPOINTMENT OR FOR BEHAVOIRAL CONCERN     To [facility]   Request not appropriate  Chrissie Travis RN on 4/21/2022 at 11:47 AM

## 2022-04-25 DIAGNOSIS — K59.00 CONSTIPATION, UNSPECIFIED CONSTIPATION TYPE: ICD-10-CM

## 2022-04-26 DIAGNOSIS — M19.90 OSTEOARTHRITIS, UNSPECIFIED OSTEOARTHRITIS TYPE, UNSPECIFIED SITE: ICD-10-CM

## 2022-04-26 DIAGNOSIS — R52 PAIN: ICD-10-CM

## 2022-04-26 DIAGNOSIS — K59.00 CONSTIPATION, UNSPECIFIED CONSTIPATION TYPE: ICD-10-CM

## 2022-04-27 RX ORDER — POLYETHYLENE GLYCOL 3350 17 G/17G
POWDER, FOR SOLUTION ORAL
Qty: 510 G | Refills: 1 | Status: SHIPPED | OUTPATIENT
Start: 2022-04-27 | End: 2022-06-28

## 2022-04-27 NOTE — TELEPHONE ENCOUNTER
"   Disp Refills Start End KENNY   polyethylene glycol (MIRALAX) 17 GM/Dose powder 510 g 0 12/16/2021  No   Sig - Route: Take 17 g (1 capful) by mouth daily - Oral   Sent to pharmacy as: Polyethylene Glycol 3350 17 GM/SCOOP Oral Powder (MiraLax)   Class: E-Prescribe       Last Office Visit: 12/13/2021  Future Office visit:         Requested Prescriptions   Pending Prescriptions Disp Refills     polyethylene glycol (MIRALAX) 17 GM/Dose powder [Pharmacy Med Name: PEG 3350 17G PWD FOR SOLN] 510 g 0     Sig: DISSOLVE 17GM (1 CAPFUL) IN LIQUID AND DRINK BY MOUTH DAILY       Laxatives Protocol Passed - 4/25/2022  9:51 AM        Passed - Patient is age 6 or older        Passed - Recent (12 mo) or future (30 days) visit within the authorizing provider's specialty     Patient has had an office visit with the authorizing provider or a provider within the authorizing providers department within the previous 12 mos or has a future within next 30 days. See \"Patient Info\" tab in inbasket, or \"Choose Columns\" in Meds & Orders section of the refill encounter.              Passed - Medication is active on med list             Routing refill request to provider for review/approval.     Unable to complete prescription refill per RNMedication Refill Policy.................... Juliane Pack RN ....................  4/27/2022   9:04 AM          "

## 2022-04-28 RX ORDER — ACETAMINOPHEN 500 MG/1
TABLET ORAL
Qty: 360 TABLET | Refills: 1 | Status: SHIPPED | OUTPATIENT
Start: 2022-04-28 | End: 2022-05-19

## 2022-04-28 RX ORDER — SENNOSIDES 8.6 MG/1
TABLET ORAL
Qty: 180 TABLET | Refills: 1 | Status: SHIPPED | OUTPATIENT
Start: 2022-04-28 | End: 2022-05-19

## 2022-04-28 RX ORDER — DOCUSATE SODIUM 100 MG/1
CAPSULE, LIQUID FILLED ORAL
Qty: 180 CAPSULE | Refills: 0 | Status: SHIPPED | OUTPATIENT
Start: 2022-04-28 | End: 2022-05-19

## 2022-05-03 DIAGNOSIS — F69 BEHAVIOR PROBLEM, ADULT: ICD-10-CM

## 2022-05-03 RX ORDER — LORAZEPAM 2 MG/1
TABLET ORAL
Qty: 30 TABLET | Refills: 0 | Status: SHIPPED | OUTPATIENT
Start: 2022-05-03 | End: 2023-05-17

## 2022-05-03 NOTE — TELEPHONE ENCOUNTER
Per pharmacy, facility is requesting a Rx for 30 tabs. Franco Cole RN, BSN  ....................  5/3/2022   3:39 PM

## 2022-05-18 DIAGNOSIS — M19.90 OSTEOARTHRITIS, UNSPECIFIED OSTEOARTHRITIS TYPE, UNSPECIFIED SITE: ICD-10-CM

## 2022-05-18 DIAGNOSIS — K59.00 CONSTIPATION, UNSPECIFIED CONSTIPATION TYPE: ICD-10-CM

## 2022-05-18 DIAGNOSIS — R52 PAIN: ICD-10-CM

## 2022-05-19 RX ORDER — ACETAMINOPHEN 500 MG/1
TABLET ORAL
Qty: 360 TABLET | Refills: 1 | Status: SHIPPED | OUTPATIENT
Start: 2022-05-19 | End: 2022-05-23

## 2022-05-19 RX ORDER — DOCUSATE SODIUM 100 MG/1
CAPSULE, LIQUID FILLED ORAL
Qty: 180 CAPSULE | Refills: 0 | Status: SHIPPED | OUTPATIENT
Start: 2022-05-19 | End: 2022-05-23

## 2022-05-19 RX ORDER — SENNOSIDES 8.6 MG
TABLET ORAL
Qty: 180 TABLET | Refills: 1 | Status: SHIPPED | OUTPATIENT
Start: 2022-05-19 | End: 2022-05-23

## 2022-05-20 DIAGNOSIS — F79 INTELLECTUAL DISABILITY: ICD-10-CM

## 2022-05-20 DIAGNOSIS — K59.00 CONSTIPATION, UNSPECIFIED CONSTIPATION TYPE: ICD-10-CM

## 2022-05-20 DIAGNOSIS — M10.9 GOUT, UNSPECIFIED CAUSE, UNSPECIFIED CHRONICITY, UNSPECIFIED SITE: ICD-10-CM

## 2022-05-20 DIAGNOSIS — R52 PAIN: ICD-10-CM

## 2022-05-20 DIAGNOSIS — M19.90 OSTEOARTHRITIS, UNSPECIFIED OSTEOARTHRITIS TYPE, UNSPECIFIED SITE: ICD-10-CM

## 2022-05-23 DIAGNOSIS — K59.00 CONSTIPATION, UNSPECIFIED CONSTIPATION TYPE: ICD-10-CM

## 2022-05-23 RX ORDER — OLANZAPINE 5 MG/1
TABLET ORAL
Qty: 90 TABLET | Refills: 3 | Status: SHIPPED | OUTPATIENT
Start: 2022-05-23 | End: 2023-04-20

## 2022-05-23 RX ORDER — DOCUSATE SODIUM 100 MG/1
CAPSULE, LIQUID FILLED ORAL
Qty: 90 CAPSULE | Refills: 0 | Status: SHIPPED | OUTPATIENT
Start: 2022-05-23 | End: 2022-11-21

## 2022-05-23 RX ORDER — ALLOPURINOL 300 MG/1
300 TABLET ORAL DAILY
Qty: 90 TABLET | Refills: 0 | Status: SHIPPED | OUTPATIENT
Start: 2022-05-23 | End: 2022-07-19

## 2022-05-23 RX ORDER — SENNOSIDES 8.6 MG
1 TABLET ORAL DAILY
Qty: 180 TABLET | Refills: 1 | Status: SHIPPED | OUTPATIENT
Start: 2022-05-23 | End: 2023-07-03

## 2022-05-23 RX ORDER — DOCUSATE SODIUM 100 MG/1
100 CAPSULE, LIQUID FILLED ORAL DAILY
Qty: 100 CAPSULE | Refills: 0 | Status: SHIPPED | OUTPATIENT
Start: 2022-05-23 | End: 2023-12-04

## 2022-05-23 RX ORDER — ACETAMINOPHEN 500 MG
1000 TABLET ORAL DAILY
Qty: 360 TABLET | Refills: 1 | Status: SHIPPED | OUTPATIENT
Start: 2022-05-23 | End: 2022-09-06

## 2022-05-23 NOTE — TELEPHONE ENCOUNTER
[id number] sent Rx request for the following:      PAIN RELIEVER ES 500MG TABLET      Last Prescription Date:   5/19/2022  Last Fill Qty/Refills:         360, R-1    Last Office Visit:              12/13/2021   Future Office visit:           none       SENNA 8.6MG LAXATIVE TABLET      Last Prescription Date:   5/19/2022  Last Fill Qty/Refills:         180, R-1         DOCUSATE SODIUM 100MG SOFTGEL      Last Prescription Date:   5/23/2022  Last Fill Qty/Refills:         100, R-0      According to note from pharmacy the Docusate, Senna, and Tylenol ar supposed to be once daily per Aleisha from [facility].        OLANZAPINE 5MG TABLET      Last Prescription Date:   6/21/2021  Last Fill Qty/Refills:         90, R-3         ALLOPURINOL 300MG TABLET      Last Prescription Date:   3/23/2022  Last Fill Qty/Refills:         90, R-0        Franco Cole RN, BSN  ....................  5/23/2022   3:34 PM

## 2022-05-23 NOTE — TELEPHONE ENCOUNTER
Per pharmacy:  Aleisha from [facility] said these should be once daily.    docusate sodium (COLACE) 100 MG capsule 180 capsule 0 5/19/2022  No   Sig: TAKE 1 CAPSULE (100 MG) BY MOUTH 2 TIMES DAILY     Colin'd up as requested.    Pooja Carlos RN .............. 5/23/2022  9:26 AM

## 2022-06-27 DIAGNOSIS — K59.00 CONSTIPATION, UNSPECIFIED CONSTIPATION TYPE: ICD-10-CM

## 2022-06-28 RX ORDER — POLYETHYLENE GLYCOL 3350 17 G/17G
POWDER, FOR SOLUTION ORAL
Qty: 510 G | Refills: 1 | Status: SHIPPED | OUTPATIENT
Start: 2022-06-28 | End: 2022-09-14

## 2022-06-28 NOTE — TELEPHONE ENCOUNTER
"[id number] sent Rx request for the following:   GAVILAX 17 GM/SCOOP powder  SigDISSOLVE 17GM (1 CAPFUL) INLIQUID AND DRINK BY MOUTH DAILY    Last Prescription Date:   04/27/2022  Last Fill Qty/Refills:         510 g, R-1    Last Office Visit:              12/13/2021 ([facility])   Future Office visit:           None noted      Laxatives Protocol Passed - 6/27/2022  8:51 AM        Passed - Patient is age 6 or older        Passed - Recent (12 mo) or future (30 days) visit within the authorizing provider's specialty     Patient has had an office visit with the authorizing provider or a provider within the authorizing providers department within the previous 12 mos or has a future within next 30 days. See \"Patient Info\" tab in inbasket, or \"Choose Columns\" in Meds & Orders section of the refill encounter.              Passed - Medication is active on med list         Unable to complete prescription refill per RN Medication Refill Policy.................... Christine Shane RN ....................  6/28/2022   10:53 AM        "

## 2022-07-18 DIAGNOSIS — M10.9 GOUT, UNSPECIFIED CAUSE, UNSPECIFIED CHRONICITY, UNSPECIFIED SITE: ICD-10-CM

## 2022-07-19 ENCOUNTER — MYC MEDICAL ADVICE (OUTPATIENT)
Dept: FAMILY MEDICINE | Facility: OTHER | Age: 77
End: 2022-07-19

## 2022-07-19 DIAGNOSIS — K59.03 DRUG-INDUCED CONSTIPATION: ICD-10-CM

## 2022-07-19 RX ORDER — ALLOPURINOL 300 MG/1
TABLET ORAL
Qty: 90 TABLET | Refills: 3 | Status: SHIPPED | OUTPATIENT
Start: 2022-07-19 | End: 2023-07-03

## 2022-07-20 RX ORDER — LUBIPROSTONE 24 UG/1
CAPSULE ORAL
Qty: 90 CAPSULE | Refills: 1 | Status: SHIPPED | OUTPATIENT
Start: 2022-07-20 | End: 2022-11-29

## 2022-07-20 NOTE — TELEPHONE ENCOUNTER
Routing refill request to provider for review/approval because:    LOV: 12/13/21    Chrissie Travis RN on 7/20/2022 at 9:57 AM

## 2022-07-20 NOTE — TELEPHONE ENCOUNTER
Attempted to have patient complete PHQ-2. Will need to complete during an office visit.     Jannette Rodríguez RN  ....................  7/20/2022   11:19 AM

## 2022-09-06 ENCOUNTER — OFFICE VISIT (OUTPATIENT)
Dept: INTERNAL MEDICINE | Facility: OTHER | Age: 77
End: 2022-09-06
Attending: INTERNAL MEDICINE
Payer: COMMERCIAL

## 2022-09-06 VITALS — DIASTOLIC BLOOD PRESSURE: 84 MMHG | TEMPERATURE: 98.1 F | RESPIRATION RATE: 20 BRPM | SYSTOLIC BLOOD PRESSURE: 132 MMHG

## 2022-09-06 DIAGNOSIS — K59.00 CONSTIPATION, UNSPECIFIED CONSTIPATION TYPE: Primary | ICD-10-CM

## 2022-09-06 DIAGNOSIS — R60.9 EDEMA, UNSPECIFIED TYPE: ICD-10-CM

## 2022-09-06 DIAGNOSIS — F69 BEHAVIOR PROBLEM, ADULT: ICD-10-CM

## 2022-09-06 PROCEDURE — 99213 OFFICE O/P EST LOW 20 MIN: CPT | Performed by: INTERNAL MEDICINE

## 2022-09-06 PROCEDURE — G0463 HOSPITAL OUTPT CLINIC VISIT: HCPCS

## 2022-09-06 RX ORDER — POTASSIUM CHLORIDE 750 MG/1
10 TABLET, EXTENDED RELEASE ORAL AT BEDTIME
Qty: 90 TABLET | Refills: 3 | COMMUNITY
Start: 2022-09-06 | End: 2023-12-04

## 2022-09-06 ASSESSMENT — PAIN SCALES - GENERAL: PAINLEVEL: NO PAIN (0)

## 2022-09-06 ASSESSMENT — PATIENT HEALTH QUESTIONNAIRE - PHQ9: SUM OF ALL RESPONSES TO PHQ QUESTIONS 1-9: 0

## 2022-09-06 NOTE — NURSING NOTE
"Chief Complaint   Patient presents with     Medication Problem       Initial /84   Temp 98.1  F (36.7  C) (Temporal)   Resp 20  Estimated body mass index is 21.32 kg/m  as calculated from the following:    Height as of 12/9/21: 1.854 m (6' 1\").    Weight as of 12/9/21: 73.3 kg (161 lb 9.6 oz).  FOOD SECURITY SCREENING QUESTIONS  Hunger Vital Signs:  Within the past 12 months we worried whether our food would run out before we got money to buy more. Never  Within the past 12 months the food we bought just didn't last and we didn't have money to get more. Never        Deny Swan, LPN    "

## 2022-09-06 NOTE — PROGRESS NOTES
ICD-10-CM    1. Constipation, unspecified constipation type  K59.00    2. Behavior problem, adult  F69      We will discontinue the acetaminophen in the morning.  We will move the potassium to the evening.  If he does not take the docusate, allopurinol and senna that is probably fine as long as he does not have gout or constipation issues.  I will keep me posted.    Martha Martins is a 77 year old, presenting for the following health issues:  Medication Problem      He is in today for review of his morning medications.  He is refusing to take Tylenol, potassium, senna, docusate and allopurinol some mornings.  He does take his other morning meds which include the Amitiza and BuSpar.  No other issues or concerns.  Staff is wondering what should be done in the situations.    History of Present Illness       Reason for visit:  Medication refusals    He eats 2-3 servings of fruits and vegetables daily.He consumes 2 sweetened beverage(s) daily.He exercises with enough effort to increase his heart rate 9 or less minutes per day.  He exercises with enough effort to increase his heart rate 3 or less days per week. He is missing 4 dose(s) of medications per week.  He is not taking prescribed medications regularly due to other.         Current Outpatient Medications   Medication     allopurinol (ZYLOPRIM) 300 MG tablet     busPIRone (BUSPAR) 7.5 MG tablet     docusate sodium (COLACE) 100 MG capsule     docusate sodium (COLACE) 100 MG capsule     furosemide (LASIX) 40 MG tablet     GAVILAX 17 GM/SCOOP powder     haloperidol (HALDOL) 5 MG tablet     Incontinence Supply Disposable MISC     LORazepam (ATIVAN) 2 MG tablet     lubiprostone (AMITIZA) 24 MCG capsule     OLANZapine (ZYPREXA) 5 MG tablet     omeprazole (PRILOSEC) 20 MG DR capsule     psyllium 30.9 % POWD     sennosides (SENOKOT) 8.6 MG tablet     tamsulosin (FLOMAX) 0.4 MG capsule     No current facility-administered medications for this visit.         Review of  Systems   Unable to perform ROS: Dementia            Objective    /84   Temp 98.1  F (36.7  C) (Temporal)   Resp 20   There is no height or weight on file to calculate BMI.  Physical Exam  Vitals and nursing note reviewed.   Constitutional:       General: He is not in acute distress.     Appearance: Normal appearance. He is not ill-appearing, toxic-appearing or diaphoretic.   Neurological:      Mental Status: He is alert.

## 2022-09-12 DIAGNOSIS — K59.00 CONSTIPATION, UNSPECIFIED CONSTIPATION TYPE: ICD-10-CM

## 2022-09-14 RX ORDER — POLYETHYLENE GLYCOL 3350 17 G/17G
POWDER, FOR SOLUTION ORAL
Qty: 510 G | Refills: 1 | Status: SHIPPED | OUTPATIENT
Start: 2022-09-14 | End: 2022-10-12

## 2022-09-14 NOTE — TELEPHONE ENCOUNTER
"     Disp Refills Start End KENNY   GAVILAX 17 GM/SCOOP powder 510 g 1 6/28/2022  No   Sig: DISSOLVE 17GM (1 CAPFUL) INLIQUID AND DRINK BY MOUTH DAILY   Sent to pharmacy as: GaviLAX 17 GM/SCOOP Oral Powder (polyethylene glycol)   Class: E-Prescribe       Last Office Visit: 09/06/2022  Future Office visit:         Requested Prescriptions   Pending Prescriptions Disp Refills     polyethylene glycol (MIRALAX) 17 GM/Dose powder [Pharmacy Med Name: PEG 3350 17G PWD FOR SOLN] 510 g 1     Sig: DISSOLVE 17GM (1 CAPFUL) INLIQUID AND DRINK BY MOUTHDAILY       Laxatives Protocol Passed - 9/12/2022  2:53 PM        Passed - Patient is age 6 or older        Passed - Recent (12 mo) or future (30 days) visit within the authorizing provider's specialty     Patient has had an office visit with the authorizing provider or a provider within the authorizing providers department within the previous 12 mos or has a future within next 30 days. See \"Patient Info\" tab in inbasket, or \"Choose Columns\" in Meds & Orders section of the refill encounter.              Passed - Medication is active on med list             Routing refill request to provider for review/approval.     Unable to complete prescription refill per RNMedication Refill Policy.................... Juliane Pack RN ....................  9/14/2022   2:06 PM          "

## 2022-09-17 ENCOUNTER — HEALTH MAINTENANCE LETTER (OUTPATIENT)
Age: 77
End: 2022-09-17

## 2022-09-22 ENCOUNTER — HOSPITAL ENCOUNTER (EMERGENCY)
Facility: OTHER | Age: 77
Discharge: HOME | End: 2022-09-22
Attending: PHYSICIAN ASSISTANT | Admitting: PHYSICIAN ASSISTANT
Payer: COMMERCIAL

## 2022-09-22 VITALS
WEIGHT: 150.9 LBS | BODY MASS INDEX: 20 KG/M2 | TEMPERATURE: 98.2 F | SYSTOLIC BLOOD PRESSURE: 150 MMHG | HEIGHT: 73 IN | RESPIRATION RATE: 19 BRPM | DIASTOLIC BLOOD PRESSURE: 86 MMHG | HEART RATE: 95 BPM | OXYGEN SATURATION: 97 %

## 2022-09-22 DIAGNOSIS — K40.90 LEFT INGUINAL HERNIA: ICD-10-CM

## 2022-09-22 PROCEDURE — 99283 EMERGENCY DEPT VISIT LOW MDM: CPT | Performed by: PHYSICIAN ASSISTANT

## 2022-09-22 ASSESSMENT — ACTIVITIES OF DAILY LIVING (ADL): ADLS_ACUITY_SCORE: 35

## 2022-09-23 ENCOUNTER — TELEPHONE (OUTPATIENT)
Dept: INTERNAL MEDICINE | Facility: OTHER | Age: 77
End: 2022-09-23

## 2022-09-23 ENCOUNTER — MYC MEDICAL ADVICE (OUTPATIENT)
Dept: INTERNAL MEDICINE | Facility: OTHER | Age: 77
End: 2022-09-23

## 2022-09-23 NOTE — TELEPHONE ENCOUNTER
Patient has a hernia that keeps bulging out . Patient was seen in the er last night. Patients POA Jessica and said he is to see a surgeon for this hernia. MAF is not in clinic today. Would DJS be able to help with this . Patient is at the [facility] and they have to keep pushing the bulge back in. Contact at the  [facility] is Maryanne . Phone - [phone] or cell [phone].     Zunilda Junior on 9/23/2022 at 8:55 AM

## 2022-09-23 NOTE — TELEPHONE ENCOUNTER
Please call and schedule a surgical clinic consult appointment.      Electronically signed by:  Santos Mendez MD  on September 23, 2022 1:01 PM

## 2022-09-23 NOTE — DISCHARGE INSTRUCTIONS
PLEASE CONTACT THE SURGERY CLINIC FOR A FOLLOW UP APPOINTMENT.    FOLLOW UP WITH YOUR DOCTOR IN THE NEXT 24-48 HOURS FOR A RECHECK as needed.    RETURN TO THE ER IF SYMPTOMS WORSEN OR IF YOU HAVE FURTHER CONCERNS   TAKE ALL PREVIOUS AND ANY NEW MEDS AS PRESCRIBED       THE DISCHARGE INSTRUCTIONS ARE INTENDED AS A COMPLEMENT TO AND NOT A REPLACEMENT FOR THE VERBAL INSTRUCTIONS THAT I HAVE PROVIDED YOU TONIGHT. AFTER GOING OVER THE PLAN OF CARE TONIGHT, INCLUDING SIDE EFFECTS, ADVERSE REACTIONS OF ALL MEDICATIONS PRESCRIBED (THIS WILL BE PROVIDED TO YOU AT THE PHARMACY ALSO) AND PROVIDING YOU WITH THE VERBAL INSTRUCTIONS AT DISCHARGE YOU HAVE HAD THE OPPORTUNITY TO ASK FURTHER QUESTIONS AND TO CLARIFY UNCERTAINTIES. SINCE YOU HAVE NO FURTHER QUESTIONS PLEASE HAVE A WONDERFUL SAFE EVENING THANK YOU.         WORK/SCHOOL NOTE;    PLEASE EXCUSE THE ABOVE PATIENT FROM EITHER WORK OR SCHOOL FOR THE DAY/NIGHT.

## 2022-09-23 NOTE — ED TRIAGE NOTES
Pt arrives via [facility] from [facility].  Staff noticed tonight a mass in his left groin area.  Staff reports a softball size lump, not pulsating or hot to touch.  Pt does have a hx of marphan's syndrome and nonverbal.  Staff at bedside.     Triage Assessment     Row Name 09/22/22 2550       Triage Assessment (Adult)    Airway WDL WDL       Cardiac WDL    Cardiac WDL WDL       Peripheral/Neurovascular WDL    Peripheral Neurovascular WDL WDL

## 2022-09-23 NOTE — ED PROVIDER NOTES
History     Chief Complaint   Patient presents with     Mass     HPI  Dhiraj De La Cruz is a 77 year old male who presents to the emergency department for evaluation.  Staff are out moving the gentleman and noticed that he did have a lump in his left groin.  It is nonpulsatile.  Denies any trauma or recent lifting injuries patient does not provide any information in the history history is obtained from staff members.  The patient is noted to have an area in the inguinal region it appears that is involved.  Is nontender he has not had any vomiting he is moving his stool appropriately he is nontoxic and he is at his baseline and there is been no changes from that.    Allergies:  No Known Allergies    Problem List:    Patient Active Problem List    Diagnosis Date Noted     Behavior problem, adult 11/20/2018     Priority: Medium     Aortic aneurysm (H) 02/12/2018     Priority: Medium     Overview:   history of       Edema 02/12/2018     Priority: Medium     Gastritis 02/12/2018     Priority: Medium     Overview:   nonsteriodal induced gastritis with GI bleed       Glaucoma 02/12/2018     Priority: Medium     Inguinal hernia, left 02/12/2018     Priority: Medium     Marfan's syndrome 02/12/2018     Priority: Medium     Mental retardation 02/12/2018     Priority: Medium     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     Priority: Medium     Venous (peripheral) insufficiency 02/12/2018     Priority: Medium     Overview:   legs       Urinary incontinence 02/12/2018     Priority: Medium     ACP (advance care planning) 12/12/2013     Priority: Medium     Gout 12/09/2013     Priority: Medium     Abnormal glucose 08/10/2010     Priority: Medium     Constipation 07/22/2010     Priority: Medium        Past Medical History:    Past Medical History:   Diagnosis Date     Acute pancreatitis without infection or necrosis      Aortic aneurysm, intrathoracic (H)      Chronic constipation      Gastritis without bleeding      Glaucoma      Gout       "Hyperglycemia      Intellectual disability      Marfan's syndrome      Other specified disorders of veins      Situs inversus      Urinary incontinence        Past Surgical History:    Past Surgical History:   Procedure Laterality Date     CHOLECYSTECTOMY         Family History:    No family history on file.    Social History:  Marital Status:  Single [1]  Social History     Tobacco Use     Smoking status: Former Smoker     Packs/day: 0.00     Quit date: 2004     Years since quittin.6     Smokeless tobacco: Never Used   Vaping Use     Vaping Use: Never used   Substance Use Topics     Alcohol use: No     Drug use: Never        Medications:    allopurinol (ZYLOPRIM) 300 MG tablet  busPIRone (BUSPAR) 7.5 MG tablet  docusate sodium (COLACE) 100 MG capsule  docusate sodium (COLACE) 100 MG capsule  furosemide (LASIX) 40 MG tablet  haloperidol (HALDOL) 5 MG tablet  Incontinence Supply Disposable MISC  LORazepam (ATIVAN) 2 MG tablet  lubiprostone (AMITIZA) 24 MCG capsule  OLANZapine (ZYPREXA) 5 MG tablet  omeprazole (PRILOSEC) 20 MG DR capsule  polyethylene glycol (MIRALAX) 17 GM/Dose powder  potassium chloride ER (K-TAB/KLOR-CON) 10 MEQ CR tablet  psyllium 30.9 % POWD  sennosides (SENOKOT) 8.6 MG tablet  tamsulosin (FLOMAX) 0.4 MG capsule          Review of Systems  Please see HPI for pertinent positives and negatives.  All other systems reviewed and found to be negative.    Physical Exam   BP: (!) 150/86  Pulse: 95  Temp: 98.2  F (36.8  C)  Resp: 19  Height: 185.4 cm (6' 1\")  Weight: 68.4 kg (150 lb 14.4 oz)  SpO2: 97 %      Physical Exam   Exam:  Constitutional: Pleasant gentleman  Head: Normocephalic.    Neck: Neck supple.   Cardiovascular:RRR.   Respiratory: Lungs are clear no respiratory distress  Gastrointestinal: Abdomen soft, non-tender.  Left lower quadrant and right inguinal region there is a mass consistent with an inguinal hernia that is reducible.  Skin: no suspicious lesions or rashes on exposed " skin surfaces        ED Course                 Procedures                No results found for this or any previous visit (from the past 24 hour(s)).    Medications - No data to display    Assessments & Plan (with Medical Decision Making)     I have reviewed the nursing notes.    I have reviewed the findings, diagnosis, plan and need for follow up with the patient.  Differential diagnosis at this point includes: appendicitis, aortic aneurysm, mesenteric ischemia, bowel perforation, bowel obstruction, cholecystitis, pancreatitis, hepatitis, gastritis, GERD, diverticulitis, PUD, pyelonephritis/UTI, renal colic/stone, testicular torsion or other acute scrotal process, inflammatory bowel diseases, AAA as well as other etiologies.  Gentleman who presents to the emergency department for evaluation brought in by staff members.  They got him up to the restroom notice a bulge in his left groin.  When he arrived here it is quite present it is nonpulsatile.  It is reducible it is consistent with an inguinal hernia.  I was able to push it in with ease without any difficulty.  I did inform the staff members at he does have an inguinal hernia.  I showed them how to reduce it as well.  We went over precautions and when to return to the emergency department.  I provided with information of the surgery clinic he should follow-up with the surgery clinic for a consultation.  If symptoms worsen if there is further concerns or they are not able to reduce the hernia comes out again he was instructed to return to the emergency department for further assistance and evaluation  I explained my diagnostic considerations and recommendations and the patient care representative. She voiced an understanding and was in agreement with the treatment plan. All questions were answered. We discussed potential side effects of any prescribed or recommended therapies, as well as expectations for response to treatments.      New Prescriptions    No  medications on file       Final diagnoses:   Left inguinal hernia       9/22/2022   [facility] AND [facility]     Arvin Olson PA-C  09/22/22 3454

## 2022-09-26 NOTE — TELEPHONE ENCOUNTER
Clarify with Jessica.  The visit with surgery will not be the date of surgery, it will just be a consultation.  If surgery is scheduled, the surgeon can do the preop on October 11 and arrange for the COVID test whenever it needs to be done.

## 2022-10-11 ENCOUNTER — OFFICE VISIT (OUTPATIENT)
Dept: SURGERY | Facility: OTHER | Age: 77
End: 2022-10-11
Attending: SURGERY
Payer: COMMERCIAL

## 2022-10-11 VITALS
TEMPERATURE: 98.4 F | WEIGHT: 150 LBS | OXYGEN SATURATION: 99 % | HEIGHT: 73 IN | BODY MASS INDEX: 19.88 KG/M2 | RESPIRATION RATE: 16 BRPM | HEART RATE: 109 BPM | SYSTOLIC BLOOD PRESSURE: 130 MMHG | DIASTOLIC BLOOD PRESSURE: 86 MMHG

## 2022-10-11 DIAGNOSIS — K40.90 INGUINAL HERNIA, LEFT: Primary | ICD-10-CM

## 2022-10-11 PROCEDURE — G0463 HOSPITAL OUTPT CLINIC VISIT: HCPCS

## 2022-10-11 PROCEDURE — 99203 OFFICE O/P NEW LOW 30 MIN: CPT | Performed by: SURGERY

## 2022-10-11 ASSESSMENT — PAIN SCALES - GENERAL: PAINLEVEL: NO PAIN (0)

## 2022-10-11 NOTE — NURSING NOTE
"Chief Complaint   Patient presents with     Consult     Left inguinal hernia     It was found on 9/22/2022. Prior to that it was reported by care giver that it was not noticed prior to that date.       Initial /86 (BP Location: Right arm, Patient Position: Sitting, Cuff Size: Adult Large)   Pulse 109   Temp 98.4  F (36.9  C) (Tympanic)   Resp 16   Ht 1.854 m (6' 1\")   Wt 68 kg (150 lb)   SpO2 99%   BMI 19.79 kg/m   Estimated body mass index is 19.79 kg/m  as calculated from the following:    Height as of this encounter: 1.854 m (6' 1\").    Weight as of this encounter: 68 kg (150 lb).  Medication Reconciliation: complete  Jannette Rodríguez RN    "

## 2022-10-11 NOTE — PROGRESS NOTES
GENERAL SURGERY CONSULTATION NOTE    Dhiraj REYES Jono   [address] [facility] [address]  77 year old  male  Admission Date/Time: No admission date for patient encounter.  Primary Care Provider:Armaan Gallego was asked to see this patient by Dr. Mendez for evaluation of left inguinal hernia.     HPI: Staff notes that Eliezer does not like them having to use it all the time.  Otherwise they cannot tell that it has been bothering him.  Bowel function has been good lately.  Does have chronic history of constipation and difficulty with urination    REVIEW OF SYSTEMS:     Unable to review review of systems due to nonverbal nature      Patient Active Problem List   Diagnosis     ACP (advance care planning)     Aortic aneurysm (H)     Constipation     Abnormal glucose     Edema     Gastritis     Glaucoma     Gout     Inguinal hernia, left     Marfan's syndrome     Mental retardation     Situs inversus     Venous (peripheral) insufficiency     Urinary incontinence     Behavior problem, adult       Past Medical History:   Diagnosis Date     Acute pancreatitis without infection or necrosis     Recurrent     Aortic aneurysm, intrathoracic      Chronic constipation      Gastritis without bleeding     Nonsteroidal induced gastritis with GI bleed     Glaucoma      Gout     12/9/2013     Hyperglycemia      Intellectual disability     No Comments Provided     Marfan's syndrome     No Comments Provided     Other specified disorders of veins     Chronic venous stasis     Situs inversus     No Comments Provided     Urinary incontinence     No Comments Provided       Past Surgical History:   Procedure Laterality Date     CHOLECYSTECTOMY  2010       No family history on file.    Social History     Social History Narrative    Patient is a long term resident of the [facility].  Legal guardian is his sister, Jessica Altamirano.  Code status is DNR--therapeutic.       Social History     Socioeconomic History     Marital status:  Single     Spouse name: Not on file     Number of children: Not on file     Years of education: Not on file     Highest education level: Not on file   Occupational History     Not on file   Tobacco Use     Smoking status: Former     Packs/day: 0.00     Types: Cigarettes     Quit date: 2004     Years since quittin.7     Smokeless tobacco: Never   Vaping Use     Vaping Use: Never used   Substance and Sexual Activity     Alcohol use: No     Drug use: Never     Sexual activity: Not Currently   Other Topics Concern     Not on file   Social History Narrative    Patient is a long term resident of the [facility].  Legal guardian is his sister, Jessica Altamirano.  Code status is DNR--therapeutic.     Social Determinants of Health     Financial Resource Strain: Not on file   Food Insecurity: Not on file   Transportation Needs: Not on file   Physical Activity: Not on file   Stress: Not on file   Social Connections: Not on file   Intimate Partner Violence: Not on file   Housing Stability: Not on file       allopurinol (ZYLOPRIM) 300 MG tablet, TAKE 1 TABLET BY MOUTH EVERY MORNING  busPIRone (BUSPAR) 7.5 MG tablet, Take 1 tablet (7.5 mg) by mouth 3 times daily  docusate sodium (COLACE) 100 MG capsule, TAKE 1 CAPSULE (100 MG) BY MOUTH ONCE DAILY.  docusate sodium (COLACE) 100 MG capsule, Take 1 capsule (100 mg) by mouth daily  furosemide (LASIX) 40 MG tablet, TAKE 1 TAB BY MOUTH ON TUE THR SAT SUN  haloperidol (HALDOL) 5 MG tablet, Prior to appointment prn  Incontinence Supply Disposable MISC, ATTENDS ADV UDWR SZ:LARGE PC #[id number] 18/PP DX:N39.41 HCPC:  144EA/8PKGS/29DS  LORazepam (ATIVAN) 2 MG tablet, TAKE 1 TABLET BY MOUTH DAILY AS NEEDED PRIOR TO APPOINTMENT OR FOR BEHAVOIRAL CONCERN  lubiprostone (AMITIZA) 24 MCG capsule, TAKE 1 CAPSULE (24 MCG) BY MOUTH DAILY (WITH BREAKFAST) *MUST BE BOTTLE FILLED*  OLANZapine (ZYPREXA) 5 MG tablet, TAKE 1 TABLET BY MOUTH DAILY AT 8 PM  omeprazole (PRILOSEC) 20 MG DR capsule,  "TAKE 1 CAPSULE BY MOUTH DAILY  polyethylene glycol (MIRALAX) 17 GM/Dose powder, DISSOLVE 17GM (1 CAPFUL) INLIQUID AND DRINK BY MOUTHDAILY  potassium chloride ER (K-TAB/KLOR-CON) 10 MEQ CR tablet, Take 1 tablet (10 mEq) by mouth At Bedtime  psyllium 30.9 % POWD, Take 1 Tablespoonful by mouth daily Mix in 8 ounces of water or orange juice.  sennosides (SENOKOT) 8.6 MG tablet, Take 1 tablet by mouth daily  tamsulosin (FLOMAX) 0.4 MG capsule, TAKE 1 CAPSULE (0.4 MG) BY MOUTH DAILY    No current facility-administered medications on file prior to visit.        ALLERGIES/SENSITIVITIES: No Known Allergies    PHYSICAL EXAM:     /86 (BP Location: Right arm, Patient Position: Sitting, Cuff Size: Adult Large)   Pulse 109   Temp 98.4  F (36.9  C) (Tympanic)   Resp 16   Ht 1.854 m (6' 1\")   Wt 68 kg (150 lb)   SpO2 99%   BMI 19.79 kg/m      General Appearance: Nonverbal.  No distress unless trying to stand patient  Abd: Spontaneously reducing left inguinal hernia  Ext: Deformity due to Marfan      ADDITIONAL COMMENTS:      CONSULTATION ASSESSMENT AND PLAN:    77 year old male with Marfan and new left inguinal hernia.  Elderly, frail, and not clearly symptomatic inguinal hernia.    Discussed with guardian and staff options for observation versus surgery.    We will proceed with observation and they will present to the ER if it ever becomes incarcerated/strangulated.    They do not need to continue to attempt reduction if this is soft and self reducing.      Carlitos Levy MD on 10/11/2022 at 1:27 PM      "

## 2022-10-12 DIAGNOSIS — K59.00 CONSTIPATION, UNSPECIFIED CONSTIPATION TYPE: ICD-10-CM

## 2022-10-12 DIAGNOSIS — F69 BEHAVIOR PROBLEM, ADULT: ICD-10-CM

## 2022-10-12 RX ORDER — POLYETHYLENE GLYCOL 3350 17 G/17G
POWDER, FOR SOLUTION ORAL
Qty: 510 G | Refills: 1 | Status: SHIPPED | OUTPATIENT
Start: 2022-10-12 | End: 2023-01-09

## 2022-10-12 RX ORDER — HALOPERIDOL 5 MG/1
TABLET ORAL
Qty: 15 TABLET | Refills: 1 | Status: SHIPPED | OUTPATIENT
Start: 2022-10-12 | End: 2024-02-21

## 2022-10-19 DIAGNOSIS — Q87.40 MARFAN'S SYNDROME: ICD-10-CM

## 2022-10-19 RX ORDER — BUSPIRONE HYDROCHLORIDE 7.5 MG/1
7.5 TABLET ORAL 3 TIMES DAILY
Qty: 90 TABLET | Refills: 11 | Status: SHIPPED | OUTPATIENT
Start: 2022-10-19 | End: 2023-09-21

## 2022-10-19 NOTE — TELEPHONE ENCOUNTER
SHAHEEN sent Rx request for the following:    BUSPIRONE 7.5MG TABLET  Last Prescription Date:   12/13/21  Last Fill Qty/Refills:         90, R-11    Last Office Visit:              9/6/22   Future Office visit:           None   Maribel Hall RN on 10/19/2022 at 1:07 PM

## 2022-10-20 DIAGNOSIS — E87.6 HYPOKALEMIA: Primary | ICD-10-CM

## 2022-10-21 RX ORDER — POTASSIUM CHLORIDE 20MEQ/15ML
LIQUID (ML) ORAL
Qty: 473 ML | Refills: 0 | Status: SHIPPED | OUTPATIENT
Start: 2022-10-21 | End: 2022-11-16

## 2022-10-21 NOTE — TELEPHONE ENCOUNTER
MALAIKAD sent Rx request for the following:    POTASSIUM CL 20MEQ/15ML SOLN  Last Prescription Date:   9/6/22  Last Fill Qty/Refills:         90, R-3    Last Office Visit:              9/6/22   Future Office visit:           None   Requesting liquid formula.  In clinical absence of patient's primary, Armaan Gallego, patient is requesting that this message be sent to the covering provider for consideration please.  Maribel Hall RN on 10/21/2022 at 9:14 AM

## 2022-10-21 NOTE — TELEPHONE ENCOUNTER
LMTCB to schedule appointment.    Pt due for annual exam after 11/1/22.    Angela Joyce on 10/21/2022 at 1:12 PM     [x] [facility]  [address]  [phone]  F: [phone] [] [address]  [place] [address]   [address]  P: [phone]  F: [phone] [] [address]  [address]  P: [phone]  F: [phone] [] [address]  P: [phone]  F: [phone] [] [address]  [facility]   [address]   [place]: [phone]  F: [phone]      Physical Therapy Daily Treatment Note    Date:  2022  Patient Name:  Sylvia Azar    :  1991  MRN: [id number]  Physician: Jackie Allen DO                       Insurance: auto insurance   Medical Diagnosis: Tear of peroneal tendon, left, initial encounter (E51.265L [ICD-10-CM])  Rehab Codes: M25.572, M25.672, M25.472, R26.2, M62.81  Onset date: 2021                     Next 's appt. : 22  Visit# / total visits:      Cancels/No Shows: 1/2    Subjective:    Pain:  [x] Yes  [] No Location: L ankle  Pain Rating: (0-10 scale) 8/10  Pain altered Tx:  [x] No  [] Yes  Action:  Comments: Pt arrived 20 minutes late, reports her L ankle was so painful last night, up to \"11/10\" but states feeling better today than last night. Notes when it was that painful last night, did not trial any exercises/stretches or take any pain relievers.      Objective:  Modalities: VASOCOMPRESSION    Precautions: Pt is 11 weeks pregnant (22)  Exercises:  Exercise Reps/ Time Weight/ Level Comments   Scifit 5m L2 No CAM boot         Standing   Added    Weightshifting 10x ea  Fwd/back, lateral - WBAT   Hip abduction 15x  LOKC   HS curl 15x  LOKC               Seated      Heel slides 2x10  Full flex/ext   Toe yoga 10x ea     Toe scrunches  2x10     Heel/toe 2x10     BAPS 2x10 ea Level 3 DF/PF, IN/EV   Rocker board  15x ea L1 IN/EV, DF/PF - Added 7/26   4 way ankle  15xea Lime Added 7/26         Supine   Held 7/26   Ankle AROM all motions 20x ea   DF/PF, IN/EV, Circles - heel propped on bolster   Gastroc stretch with strap 3x30\"  Heel propped on bolster   Other:    Specific Instructions for next treatment: Per order- Please work on decreasing inflammation, ultrasound techniques, gentle ankle ROM      Treatment Charges: Mins Units   []  Modalities     [x]  Ther Exercise 35 2   []  Manual Therapy     []  Ther Activities     []  Aquatics     []  Vasocompression     []  Other     Total Treatment time 35 2       Assessment: [x] Progressing toward goals. Despite patient late arrival, progressed strengthening and ROM this date with addition of resisted 4 way ankle and standing exercises both OKC and CKC for LLE. Patient noted discomfort with full weight during weight shifts, verbal cues for WBAT with poor-fair carryover. Fatigue in L ankle during 4 way ankle. Encouraged patient to practice weightshifting outside of CAM boot at home with verbal understanding noted. [] No change. [] Other:  [x] Patient would continue to benefit from skilled physical therapy services in order to: address left ankle pain, stiffness, swelling, weakness, and functional impairments including difficulty walking and standing for longer periods of time. STG/LTG  STG: (to be met in 10 treatments)  ? Pain: Decrease left ankle pain to 3/10  ? ROM: Increase left ankle DF to only lacking 5° from neutral, PF to 50°, Inversion to 20°, eversion to 10°  ? Strength: Improve left ankle to 3/5 all motions  ? Function: Improve LEFS to 66% impairment  Patient to be independent with home exercise program as demonstrated by performance with correct form without cues.     LTG: (to be met in 18 treatments)  Pt will be able to walk a short distance without difficulty  Pt will be able to go up and down stairs with little

## 2022-11-15 NOTE — PROGRESS NOTES
Chief Complaint:  This patient is here for a comprehensive review of their multiple medical problems, renewal of medications and update on necessary health maintenance issues.      HPI: Eliezer is here today for an annual checkup.  Staff that is with him reports that he is doing well.  His behaviors are generally well controlled.  He is agreeable to most cares.  He takes his medications as scheduled.  Eliezer has a history of chronic constipation which is currently controlled with medications as listed below including the Amitiza.  He does have some occasional incontinence but nothing that has progressed.  There really are no new concerns or issues.    The patient needs a flu shot.  He has a history of a thoracic aneurysm but we are not following this at all.  Basically we are focusing more on comfort measures for the patient rather than any aggressive evaluations or interventions.    His medications are reconciled.  Past medical history, past surgical history, family history and social histories are reviewed and updated.    Past Medical History:   Diagnosis Date     Acute pancreatitis without infection or necrosis     Recurrent     Aortic aneurysm, intrathoracic (H)      Chronic constipation      Gastritis without bleeding     Nonsteroidal induced gastritis with GI bleed     Glaucoma      Gout     12/9/2013     Hyperglycemia      Intellectual disability     No Comments Provided     Marfan's syndrome     No Comments Provided     Other specified disorders of veins     Chronic venous stasis     Situs inversus     No Comments Provided     Urinary incontinence     No Comments Provided       Past Surgical History:   Procedure Laterality Date     CHOLECYSTECTOMY  2010       Current Outpatient Medications   Medication Sig Dispense Refill     ACETAMINOPHEN EXTRA STRENGTH 500 MG tablet TAKE 2 TABLETS (1,000 MG) BY MOUTH TWICE DAILY 360 tablet 3     allopurinol (ZYLOPRIM) 300 MG tablet TAKE 1 TABLET (300 MG) BY MOUTH DAILY IN THE  AM. 90 tablet 3     busPIRone (BUSPAR) 5 MG tablet TAKE 1 TABLET (5 MG) BY MOUTH 3 TIMES DAILY 270 tablet 2     docusate sodium (COLACE) 100 MG capsule TAKE 1 CAPSULE (100 MG) BY MOUTH 2 TIMES DAILY. 180 capsule 2     furosemide (LASIX) 40 MG tablet Take 2 tablets on Tuesday, Thursday, Saturday and Sunday. 100 tablet 3     haloperidol (HALDOL) 5 MG tablet Prior to appointment prn 15 tablet 1     Incontinence Supply Disposable MISC ATTENDS ADV UDWR LR [id number] 18/PP [id number]:[id number] [facility]:  8/PKGS 150 each 11     latanoprost (XALATAN) 0.005 % ophthalmic solution Place 1 drop into both eyes At Bedtime       LORazepam (ATIVAN) 2 MG tablet Prior to appointment prn 15 tablet 1     lubiprostone (AMITIZA) 24 MCG capsule Take 1 capsule (24 mcg) by mouth daily (with breakfast) 30 capsule 11     OLANZapine (ZYPREXA) 5 MG tablet TAKE ONE TABLET BY MOUTH AT 8PM 90 tablet 3     omeprazole (PRILOSEC) 20 MG DR capsule TAKE 1 CAPSULE BY MOUTH DAILY 180 capsule 3     potassium chloride ER (K-TAB/KLOR-CON) 10 MEQ CR tablet TAKE 1 TABLET (10 MEQ) BY MOUTH DAILY 90 tablet 3     psyllium 30.9 % POWD Take 1 Tablespoonful by mouth daily Mix in 8 ounces of water or orange juice. 1 Bottle 6     sennosides (SENOKOT) 8.6 MG tablet TAKE 1 TABLET BY MOUTH TWICE DAILY. 180 tablet 3     tamsulosin (FLOMAX) 0.4 MG capsule TAKE 1 CAPSULE (0.4 MG) BY MOUTH DAILY 90 capsule 1     timolol (TIMOPTIC-XE) 0.5 % ophthalmic gel-form INSTILL 1 DROP IN BOTH EYES EVERY MORNING         No Known Allergies    History reviewed. No pertinent family history.    Social History     Socioeconomic History     Marital status: Single     Spouse name: Not on file     Number of children: Not on file     Years of education: Not on file     Highest education level: Not on file   Occupational History     Not on file   Social Needs     Financial resource strain: Not on file     Food insecurity     Worry: Not on file     Inability: Not on file     Transportation needs     Medical:  Not on file     Non-medical: Not on file   Tobacco Use     Smoking status: Former Smoker     Packs/day: 0.00     Quit date: 2004     Years since quittin.7     Smokeless tobacco: Never Used   Substance and Sexual Activity     Alcohol use: No     Drug use: Never     Sexual activity: Not Currently   Lifestyle     Physical activity     Days per week: Not on file     Minutes per session: Not on file     Stress: Not on file   Relationships     Social connections     Talks on phone: Not on file     Gets together: Not on file     Attends [employer] service: Not on file     Active member of club or organization: Not on file     Attends meetings of clubs or organizations: Not on file     Relationship status: Not on file     Intimate partner violence     Fear of current or ex partner: Not on file     Emotionally abused: Not on file     Physically abused: Not on file     Forced sexual activity: Not on file   Other Topics Concern     Not on file   Social History Narrative    Patient is a long term resident of the [facility].  Legal guardian is his sister, Jessica Altamirano.  Code status is DNR--therapeutic.       Review of Systems   Unable to perform ROS: Patient nonverbal       Physical Exam  Vitals signs and nursing note reviewed.   Constitutional:       General: He is not in acute distress.     Appearance: He is well-developed. He is not diaphoretic.   HENT:      Head: Normocephalic.      Right Ear: There is impacted cerumen.      Left Ear: There is impacted cerumen.      Nose: Nose normal.      Mouth/Throat:      Pharynx: No oropharyngeal exudate.   Eyes:      Conjunctiva/sclera: Conjunctivae normal.      Pupils: Pupils are equal, round, and reactive to light.   Neck:      Musculoskeletal: Normal range of motion and neck supple.      Thyroid: No thyroid mass or thyromegaly.      Vascular: Normal carotid pulses. No carotid bruit or JVD.      Trachea: No tracheal deviation.   Cardiovascular:      Rate and Rhythm:  Normal rate and regular rhythm.      Heart sounds: Normal heart sounds. No murmur. No friction rub. No gallop.    Pulmonary:      Effort: Pulmonary effort is normal. No respiratory distress.      Breath sounds: Normal breath sounds. No stridor. No decreased breath sounds, wheezing, rhonchi or rales.   Abdominal:      General: Bowel sounds are normal. There is no distension.      Palpations: Abdomen is soft. There is no mass.      Tenderness: There is no abdominal tenderness. There is no guarding or rebound.      Hernia: No hernia is present.   Musculoskeletal: Normal range of motion.      Right lower leg: No edema.      Left lower leg: No edema.   Lymphadenopathy:      Cervical: No cervical adenopathy.   Skin:     General: Skin is warm and dry.      Findings: No rash.   Neurological:      Mental Status: He is alert. Mental status is at baseline.      Cranial Nerves: No cranial nerve deficit.      Sensory: No sensory deficit.      Motor: No abnormal muscle tone.      Coordination: Coordination normal.      Deep Tendon Reflexes: Reflexes normal.         Assessment:      ICD-10-CM    1. Thoracic aortic aneurysm without rupture (H)  I71.2 Comprehensive metabolic panel     CBC with platelets     CBC with platelets     Comprehensive metabolic panel   2. Constipation, unspecified constipation type  K59.00    3. Abnormal glucose  R73.09 Hemoglobin A1c     Hemoglobin A1c   4. Marfan's syndrome  Q87.40    5. Gout, unspecified cause, unspecified chronicity, unspecified site  M10.9    6. Behavior problem, adult  F69    7. Encounter for immunization  Z23 GH IMM-  FLU VACCINE, INCREASED ANTIGEN, PRESV FREE        Plan: He appears to be stable.  All of his medications and treatments are reviewed and will be continued without change.  He did not need any refills done today.  Complete lab drawn and pending, I will send a letter with the results.  Influenza vaccine given today.  Follow-up with me will be annually or anytime sooner if  needed.         Imiquimod Counseling:  I discussed with the patient the risks of imiquimod including but not limited to erythema, scaling, itching, weeping, crusting, and pain.  Patient understands that the inflammatory response to imiquimod is variable from person to person and was educated regarded proper titration schedule.  If flu-like symptoms develop, patient knows to discontinue the medication and contact us.

## 2022-11-16 DIAGNOSIS — E87.6 HYPOKALEMIA: ICD-10-CM

## 2022-11-16 DIAGNOSIS — K59.00 CONSTIPATION, UNSPECIFIED CONSTIPATION TYPE: ICD-10-CM

## 2022-11-16 RX ORDER — POTASSIUM CHLORIDE 20MEQ/15ML
LIQUID (ML) ORAL
Qty: 473 ML | Refills: 0 | Status: SHIPPED | OUTPATIENT
Start: 2022-11-16 | End: 2023-01-23

## 2022-11-16 NOTE — TELEPHONE ENCOUNTER
"PCP is out of clinic. Will route to covering Provider for consideration.      Disp Refills Start End KENNY   potassium chloride (KAYCIEL) 20 MEQ/15ML (10%) solution 473 mL 0 10/21/2022  No   Sig: GIVE 7.5MLS (10MEQ) ONCE DAILY   Sent to pharmacy as: Potassium Chloride 20 MEQ/15ML (10%) Oral Solution (KAYCIEL)   Class: E-Prescribe   Notes to Pharmacy: Prescription not previously filled at [facility]. Please authorize a new RX for this patient. Thank you. **FACILITY WANTS LIQUID INSTEAD OF TABS**   Order: [id number]   E-Prescribing Status: Receipt confirmed by pharmacy (10/21/2022 10:51 AM CDT)       Last Office Visit: 09/06/2022  Future Office visit:    Next 5 appointments (look out 90 days)    Nov 21, 2022 11:00 AM  PHYSICAL with Armaan Gallego MD  [facility] ([facility] and [facility] ) [address]  [place] [id number] [phone]         Requested Prescriptions   Pending Prescriptions Disp Refills     potassium chloride (KAYCIEL) 20 MEQ/15ML (10%) solution [Pharmacy Med Name: POTASSIUM CL 20MEQ/15ML SOLN] 473 mL 0     Sig: GIVE 7.5MLS (10MEQ) BY MOUTH ONCE DAILY       Potassium Supplements Protocol Passed - 11/16/2022 10:51 AM        Passed - Recent (12 mo) or future (30 days) visit within the authorizing provider's department     Patient has had an office visit with the authorizing provider or a provider within the authorizing providers department within the previous 12 mos or has a future within next 30 days. See \"Patient Info\" tab in inbasket, or \"Choose Columns\" in Meds & Orders section of the refill encounter.              Passed - Medication is active on med list        Passed - Patient is age 18 or older        Passed - Normal serum potassium in past 12 months     Recent Labs   Lab Test 12/09/21  1640   POTASSIUM 3.8                         Routing refill request to provider for review/approval.    Unable to complete prescription refill per " RNMedication Refill Policy.................... Juliane Pack RN ....................  11/16/2022   11:05 AM

## 2022-11-18 NOTE — TELEPHONE ENCOUNTER
"[id number]  sent Rx request for the following:      Requested Prescriptions   Pending Prescriptions Disp Refills     docusate sodium (COLACE) 100 MG capsule [Pharmacy Med Name: DOCUSATE SODIUM 100MG SOFTGEL] 90 capsule 0     Sig: TAKE 1 CAPSULE (100 MG) BY MOUTH ONCE DAILY.       Laxatives Protocol Passed - 11/16/2022 10:51 AM        Passed - Patient is age 6 or older        Passed - Recent (12 mo) or future (30 days) visit within the authorizing provider's specialty     Patient has had an office visit with the authorizing provider or a provider within the authorizing providers department within the previous 12 mos or has a future within next 30 days. See \"Patient Info\" tab in inbasket, or \"Choose Columns\" in Meds & Orders section of the refill encounter.            Passed - Medication is active on med list     Last Prescription Date:   05/23/22  Last Fill Qty/Refills:         90, R-0  Last Office Visit:              09/06/22   Future Office visit:             Next 5 appointments (look out 90 days)    Nov 21, 2022 11:00 AM  PHYSICAL with Armaan Gallego MD  [facility] ([facility] ) [address]  [place] [id number] [phone]        Annabella Valdez RN on 11/18/2022 at 11:07 AM      "

## 2022-11-20 ASSESSMENT — ENCOUNTER SYMPTOMS
DIZZINESS: 0
FREQUENCY: 0
DYSURIA: 0
COUGH: 0
ABDOMINAL PAIN: 1
HEMATURIA: 0
HEADACHES: 0
CHILLS: 0
NAUSEA: 0
EYE PAIN: 0
ARTHRALGIAS: 1
FEVER: 0
JOINT SWELLING: 0
MYALGIAS: 0
WEAKNESS: 0
HEMATOCHEZIA: 0
CONSTIPATION: 1
SHORTNESS OF BREATH: 0
DIARRHEA: 0
PALPITATIONS: 0
PARESTHESIAS: 0
HEARTBURN: 0
NERVOUS/ANXIOUS: 1
SORE THROAT: 0

## 2022-11-20 ASSESSMENT — ACTIVITIES OF DAILY LIVING (ADL)
CURRENT_FUNCTION: HOUSEWORK REQUIRES ASSISTANCE
CURRENT_FUNCTION: MONEY MANAGEMENT REQUIRES ASSISTANCE
CURRENT_FUNCTION: SHOPPING REQUIRES ASSISTANCE
CURRENT_FUNCTION: TRANSPORTATION REQUIRES ASSISTANCE
CURRENT_FUNCTION: LAUNDRY REQUIRES ASSISTANCE
CURRENT_FUNCTION: PREPARING MEALS REQUIRES ASSISTANCE
CURRENT_FUNCTION: MEDICATION ADMINISTRATION REQUIRES ASSISTANCE
CURRENT_FUNCTION: BATHING REQUIRES ASSISTANCE
CURRENT_FUNCTION: TELEPHONE REQUIRES ASSISTANCE

## 2022-11-21 ENCOUNTER — OFFICE VISIT (OUTPATIENT)
Dept: INTERNAL MEDICINE | Facility: OTHER | Age: 77
End: 2022-11-21
Attending: INTERNAL MEDICINE
Payer: COMMERCIAL

## 2022-11-21 VITALS — DIASTOLIC BLOOD PRESSURE: 80 MMHG | RESPIRATION RATE: 16 BRPM | SYSTOLIC BLOOD PRESSURE: 128 MMHG | TEMPERATURE: 96.9 F

## 2022-11-21 DIAGNOSIS — R73.09 ABNORMAL GLUCOSE: ICD-10-CM

## 2022-11-21 DIAGNOSIS — Q87.40 MARFAN'S SYNDROME: Primary | ICD-10-CM

## 2022-11-21 DIAGNOSIS — F79 INTELLECTUAL DISABILITY: ICD-10-CM

## 2022-11-21 DIAGNOSIS — K59.00 CONSTIPATION, UNSPECIFIED CONSTIPATION TYPE: ICD-10-CM

## 2022-11-21 DIAGNOSIS — R32 URINARY INCONTINENCE, UNSPECIFIED TYPE: ICD-10-CM

## 2022-11-21 DIAGNOSIS — F69 BEHAVIOR PROBLEM, ADULT: ICD-10-CM

## 2022-11-21 PROBLEM — R60.0 PERIPHERAL EDEMA: Status: ACTIVE | Noted: 2018-02-12

## 2022-11-21 PROBLEM — R60.9 EDEMA: Status: RESOLVED | Noted: 2018-02-12 | Resolved: 2022-11-21

## 2022-11-21 LAB
ALBUMIN SERPL BCG-MCNC: 4.2 G/DL (ref 3.5–5.2)
ALP SERPL-CCNC: 127 U/L (ref 40–129)
ALT SERPL W P-5'-P-CCNC: 12 U/L (ref 10–50)
ANION GAP SERPL CALCULATED.3IONS-SCNC: 11 MMOL/L (ref 7–15)
AST SERPL W P-5'-P-CCNC: 20 U/L (ref 10–50)
BILIRUB SERPL-MCNC: 1.5 MG/DL
BUN SERPL-MCNC: 15.4 MG/DL (ref 8–23)
CALCIUM SERPL-MCNC: 9.6 MG/DL (ref 8.8–10.2)
CHLORIDE SERPL-SCNC: 102 MMOL/L (ref 98–107)
CREAT SERPL-MCNC: 0.94 MG/DL (ref 0.67–1.17)
DEPRECATED HCO3 PLAS-SCNC: 27 MMOL/L (ref 22–29)
ERYTHROCYTE [DISTWIDTH] IN BLOOD BY AUTOMATED COUNT: 13.7 % (ref 10–15)
GFR SERPL CREATININE-BSD FRML MDRD: 84 ML/MIN/1.73M2
GLUCOSE SERPL-MCNC: 125 MG/DL (ref 70–99)
HBA1C MFR BLD: 5.9 % (ref 4–6.2)
HCT VFR BLD AUTO: 46.7 % (ref 40–53)
HGB BLD-MCNC: 15.7 G/DL (ref 13.3–17.7)
MCH RBC QN AUTO: 29.1 PG (ref 26.5–33)
MCHC RBC AUTO-ENTMCNC: 33.6 G/DL (ref 31.5–36.5)
MCV RBC AUTO: 87 FL (ref 78–100)
PLATELET # BLD AUTO: 209 10E3/UL (ref 150–450)
POTASSIUM SERPL-SCNC: 4.3 MMOL/L (ref 3.4–5.3)
PROT SERPL-MCNC: 7.1 G/DL (ref 6.4–8.3)
RBC # BLD AUTO: 5.4 10E6/UL (ref 4.4–5.9)
SODIUM SERPL-SCNC: 140 MMOL/L (ref 136–145)
WBC # BLD AUTO: 6.2 10E3/UL (ref 4–11)

## 2022-11-21 PROCEDURE — 85027 COMPLETE CBC AUTOMATED: CPT | Mod: ZL | Performed by: INTERNAL MEDICINE

## 2022-11-21 PROCEDURE — G0439 PPPS, SUBSEQ VISIT: HCPCS | Performed by: INTERNAL MEDICINE

## 2022-11-21 PROCEDURE — 36415 COLL VENOUS BLD VENIPUNCTURE: CPT | Mod: ZL | Performed by: INTERNAL MEDICINE

## 2022-11-21 PROCEDURE — 83036 HEMOGLOBIN GLYCOSYLATED A1C: CPT | Mod: ZL | Performed by: INTERNAL MEDICINE

## 2022-11-21 PROCEDURE — 80053 COMPREHEN METABOLIC PANEL: CPT | Mod: ZL | Performed by: INTERNAL MEDICINE

## 2022-11-21 PROCEDURE — 82040 ASSAY OF SERUM ALBUMIN: CPT | Mod: ZL | Performed by: INTERNAL MEDICINE

## 2022-11-21 RX ORDER — CLINDAMYCIN HCL 150 MG
CAPSULE ORAL
COMMUNITY
Start: 2022-09-19 | End: 2023-12-04

## 2022-11-21 RX ORDER — TIMOLOL MALEATE 5 MG/ML
SOLUTION/ DROPS OPHTHALMIC
COMMUNITY
Start: 2022-11-12

## 2022-11-21 RX ORDER — BUSPIRONE HYDROCHLORIDE 5 MG/1
TABLET ORAL
COMMUNITY
Start: 2021-12-21 | End: 2022-11-21

## 2022-11-21 RX ORDER — LATANOPROST 50 UG/ML
1 SOLUTION/ DROPS OPHTHALMIC AT BEDTIME
COMMUNITY
Start: 2022-10-18

## 2022-11-21 RX ORDER — DOCUSATE SODIUM 100 MG/1
CAPSULE, LIQUID FILLED ORAL
Qty: 90 CAPSULE | Refills: 0 | Status: SHIPPED | OUTPATIENT
Start: 2022-11-21 | End: 2023-02-14

## 2022-11-21 ASSESSMENT — ENCOUNTER SYMPTOMS
HEMATURIA: 0
ARTHRALGIAS: 1
HEARTBURN: 0
PARESTHESIAS: 0
CONSTIPATION: 1
NAUSEA: 0
FREQUENCY: 0
NERVOUS/ANXIOUS: 1
CHILLS: 0
PALPITATIONS: 0
EYE PAIN: 0
JOINT SWELLING: 0
DYSURIA: 0
DIZZINESS: 0
FEVER: 0
ABDOMINAL PAIN: 1
DIARRHEA: 0
HEADACHES: 0
HEMATOCHEZIA: 0
COUGH: 0
WEAKNESS: 0
SHORTNESS OF BREATH: 0
MYALGIAS: 0
SORE THROAT: 0

## 2022-11-21 ASSESSMENT — ACTIVITIES OF DAILY LIVING (ADL)
CURRENT_FUNCTION: SHOPPING REQUIRES ASSISTANCE
CURRENT_FUNCTION: HOUSEWORK REQUIRES ASSISTANCE
CURRENT_FUNCTION: PREPARING MEALS REQUIRES ASSISTANCE
CURRENT_FUNCTION: MONEY MANAGEMENT REQUIRES ASSISTANCE
CURRENT_FUNCTION: MEDICATION ADMINISTRATION REQUIRES ASSISTANCE
CURRENT_FUNCTION: BATHING REQUIRES ASSISTANCE
CURRENT_FUNCTION: TRANSPORTATION REQUIRES ASSISTANCE
CURRENT_FUNCTION: LAUNDRY REQUIRES ASSISTANCE
CURRENT_FUNCTION: TELEPHONE REQUIRES ASSISTANCE

## 2022-11-21 ASSESSMENT — PAIN SCALES - GENERAL: PAINLEVEL: NO PAIN (0)

## 2022-11-21 NOTE — NURSING NOTE
"Chief Complaint   Patient presents with     Physical     Annual well visit       Initial /80 (BP Location: Left arm, Patient Position: Sitting, Cuff Size: Adult Regular)   Temp 96.9  F (36.1  C) (Temporal)   Resp (!) 126  Estimated body mass index is 19.79 kg/m  as calculated from the following:    Height as of 10/11/22: 1.854 m (6' 1\").    Weight as of 10/11/22: 68 kg (150 lb).  Medication Reconciliation: complete      FOOD SECURITY SCREENING QUESTIONS:    The next two questions are to help us understand your food security.  If you are feeling you need any assistance in this area, we have resources available to support you today.    Hunger Vital Signs:  Within the past 12 months we worried whether our food would run out before we got money to buy more. Never  Within the past 12 months the food we bought just didn't last and we didn't have money to get more. Never        Advance care plan reviewed      Jessica Jean LPN on 11/21/2022 at 11:08 AM      "

## 2022-11-21 NOTE — PROGRESS NOTES
"SUBJECTIVE:   CC: Eliezer is an 77 year old who presents for preventative health visit.     Refugio is in today for a Medicare wellness visit.  He is accompanied by staff who states that the patient appears to be stable in most respects.  He did have a recent evaluation by surgery because of her hernia and due to his age, etc. it was decided to do nothing further without.  I would agree unless he gets symptomatic.  He has chronic medical problems including Marfan syndrome, behavioral issues with mental retardation, history of gout and a history of an abnormal glucose.  He is due for recheck on all those things.    Staff reports that he has not had any significant issues.  Behavior has been stable.  Bowel and bladder have been stable.  He has been taking his medications, that was an issue a couple months ago that we addressed.  No other concerns.    Immunizations are up-to-date.  I recommended that we do some lab work today and the patient and staff are in agreement.    Patient has been advised of split billing requirements and indicates understanding: Yes     Healthy Habits:     In general, how would you rate your overall health?  Fair    Frequency of exercise:  None    Do you usually eat at least 4 servings of fruit and vegetables a day, include whole grains    & fiber and avoid regularly eating high fat or \"junk\" foods?  Yes    Taking medications regularly:  No    Barriers to taking medications:  Other    Ability to successfully perform activities of daily living:  Telephone requires assistance, transportation requires assistance, shopping requires assistance, preparing meals requires assistance, housework requires assistance, bathing requires assistance, laundry requires assistance, medication administration requires assistance and money management requires assistance    Home Safety:  No safety concerns identified    Hearing Impairment:  No hearing concerns    In the past 6 months, have you been bothered by leaking of " urine? Yes    In general, how would you rate your overall mental or emotional health?  Fair      PHQ-2 Total Score: 2          Current Outpatient Medications   Medication     allopurinol (ZYLOPRIM) 300 MG tablet     busPIRone (BUSPAR) 7.5 MG tablet     clindamycin (CLEOCIN) 150 MG capsule     docusate sodium (COLACE) 100 MG capsule     docusate sodium (COLACE) 100 MG capsule     furosemide (LASIX) 40 MG tablet     haloperidol (HALDOL) 5 MG tablet     Incontinence Supply Disposable MISC     latanoprost (XALATAN) 0.005 % ophthalmic solution     LORazepam (ATIVAN) 2 MG tablet     lubiprostone (AMITIZA) 24 MCG capsule     OLANZapine (ZYPREXA) 5 MG tablet     omeprazole (PRILOSEC) 20 MG DR capsule     polyethylene glycol (MIRALAX) 17 GM/Dose powder     potassium chloride (KAYCIEL) 20 MEQ/15ML (10%) solution     potassium chloride ER (K-TAB/KLOR-CON) 10 MEQ CR tablet     psyllium 30.9 % POWD     sennosides (SENOKOT) 8.6 MG tablet     tamsulosin (FLOMAX) 0.4 MG capsule     timolol maleate (TIMOPTIC) 0.5 % ophthalmic solution     No current facility-administered medications for this visit.         Today's PHQ-2 Score:   PHQ-2 (  [employer]) 2022   Q1: Little interest or pleasure in doing things 2   Q2: Feeling down, depressed or hopeless 0   PHQ-2 Score 2   PHQ-2 Total Score (12-17 Years)- Positive if 3 or more points; Administer PHQ-A if positive -   Q1: Little interest or pleasure in doing things More than half the days   Q2: Feeling down, depressed or hopeless Not at all   PHQ-2 Score 2           Social History     Tobacco Use     Smoking status: Former     Packs/day: 0.00     Types: Cigarettes     Quit date: 2004     Years since quittin.8     Smokeless tobacco: Never   Substance Use Topics     Alcohol use: No         Alcohol Use 2022   Prescreen: >3 drinks/day or >7 drinks/week? Not Applicable       Last PSA: No results found for: PSA    Reviewed orders with patient. Reviewed health maintenance and  updated orders accordingly - Yes  Lab work is in process    Reviewed and updated as needed this visit by clinical staff   Tobacco  Allergies  Meds  Problems  Med Hx  Surg Hx  Fam Hx          Reviewed and updated as needed this visit by Provider   Tobacco  Allergies  Meds  Problems  Med Hx  Surg Hx  Fam Hx             Review of Systems   Constitutional: Negative for chills and fever.   HENT: Negative for congestion, ear pain, hearing loss and sore throat.    Eyes: Positive for visual disturbance. Negative for pain.   Respiratory: Negative for cough and shortness of breath.    Cardiovascular: Positive for peripheral edema. Negative for chest pain and palpitations.   Gastrointestinal: Positive for abdominal pain and constipation. Negative for diarrhea, heartburn, hematochezia and nausea.   Genitourinary: Negative for dysuria, frequency, genital sores, hematuria, impotence, penile discharge and urgency.   Musculoskeletal: Positive for arthralgias. Negative for joint swelling and myalgias.   Skin: Negative for rash.   Neurological: Negative for dizziness, weakness, headaches and paresthesias.   Psychiatric/Behavioral: Positive for mood changes. The patient is nervous/anxious.          OBJECTIVE:   /80 (BP Location: Left arm, Patient Position: Sitting, Cuff Size: Adult Regular)   Temp 96.9  F (36.1  C) (Temporal)   Resp (!) 126     Physical Exam  GENERAL: At baseline, no distress.  Noncommunicative  NECK: no adenopathy, no asymmetry, masses, or scars and thyroid normal to palpation  RESP: lungs clear to auscultation - no rales, rhonchi or wheezes  CV: regular rate and rhythm, normal S1 S2, no S3 or S4, no murmur, click or rub, no peripheral edema and peripheral pulses strong  ABDOMEN: soft, nontender, no hepatosplenomegaly, no masses and bowel sounds normal  MS: No peripheral edema        ASSESSMENT/PLAN:       ICD-10-CM    1. Marfan's syndrome  Q87.40 Comprehensive metabolic panel     CBC W PLT No  Diff      2. Urinary incontinence, unspecified type  R32       3. Mental retardation  F79       4. Behavior problem, adult  F69       5. Constipation, unspecified constipation type  K59.00       6. Abnormal glucose  R73.09 Hemoglobin A1c          Patient has been advised of split billing requirements and indicates understanding: No      COUNSELING:     He appears to be stable at this point in time.  Medications will continue without change.  We are basically continuing with the palliative care with Eliezer at this point in time and he really seems to be stable and doing well.  Lab is pending, I will send a letter with the results.  Follow-up will be dependent on his clinical course      He reports that he quit smoking about 18 years ago. His smoking use included cigarettes. He has never used smokeless tobacco.        AMARI GARCIA MD  [facility] AND \[facility]\""

## 2022-11-21 NOTE — LETTER
November 21, 2022      Dhiraj De La Cruz  [address] [address]        Dear Cyndi,    Below are the results of your recent labs:    Results for orders placed or performed in visit on 11/21/22   CBC W PLT No Diff     Status: Normal   Result Value Ref Range    WBC Count 6.2 4.0 - 11.0 10e3/uL    RBC Count 5.40 4.40 - 5.90 10e6/uL    Hemoglobin 15.7 13.3 - 17.7 g/dL    Hematocrit 46.7 40.0 - 53.0 %    MCV 87 78 - 100 fL    MCH 29.1 26.5 - 33.0 pg    MCHC 33.6 31.5 - 36.5 g/dL    RDW 13.7 10.0 - 15.0 %    Platelet Count 209 150 - 450 10e3/uL   Hemoglobin A1c     Status: Normal   Result Value Ref Range    Hemoglobin A1C 5.9 4.0 - 6.2 %   Comprehensive metabolic panel     Status: Abnormal   Result Value Ref Range    Sodium 140 136 - 145 mmol/L    Potassium 4.3 3.4 - 5.3 mmol/L    Chloride 102 98 - 107 mmol/L    Carbon Dioxide (CO2) 27 22 - 29 mmol/L    Anion Gap 11 7 - 15 mmol/L    Urea Nitrogen 15.4 8.0 - 23.0 mg/dL    Creatinine 0.94 0.67 - 1.17 mg/dL    Calcium 9.6 8.8 - 10.2 mg/dL    Glucose 125 (H) 70 - 99 mg/dL    Alkaline Phosphatase 127 40 - 129 U/L    AST 20 10 - 50 U/L    ALT 12 10 - 50 U/L    Protein Total 7.1 6.4 - 8.3 g/dL    Albumin 4.2 3.5 - 5.2 g/dL    Bilirubin Total 1.5 (H) <=1.2 mg/dL    GFR Estimate 84 >60 mL/min/1.73m2        All of the blood tests look fine.  This is great news.  If you have any questions, feel free to contact me    Sincerely,        Armaan Gallego MD  Internal Medicine  [facility]

## 2022-11-28 DIAGNOSIS — K59.03 DRUG-INDUCED CONSTIPATION: ICD-10-CM

## 2022-11-29 RX ORDER — LUBIPROSTONE 24 UG/1
CAPSULE ORAL
Qty: 90 CAPSULE | Refills: 1 | Status: SHIPPED | OUTPATIENT
Start: 2022-11-29 | End: 2023-07-21

## 2022-11-29 NOTE — TELEPHONE ENCOUNTER
[id number] - [place], [place] - [address]    Requested Prescriptions   Pending Prescriptions Disp Refills     lubiprostone (AMITIZA) 24 MCG capsule [Pharmacy Med Name: LUBIPROSTONE 24MCG CAPSULE] 90 capsule 1     Sig: TAKE 1 CAPSULE (24 MCG) BY MOUTH DAILY (WITH BREAKFAST) *MUST BE BOTTLE FILLED*       There is no refill protocol information for this order        Last Written Prescription Date:  7/20/22  Last Fill Quantity: 90,   # refills: 1  Last Office Visit: 11/21/22  Future Office visit:   none    Routing refill request to provider for review/approval because:  Unable to complete prescription refill per RN Medication Refill Policy.   Mariajose Molina RN ....................  11/29/2022   11:19 AM

## 2022-12-20 DIAGNOSIS — K29.60 OTHER GASTRITIS WITHOUT HEMORRHAGE, UNSPECIFIED CHRONICITY: ICD-10-CM

## 2023-01-06 DIAGNOSIS — K59.00 CONSTIPATION, UNSPECIFIED CONSTIPATION TYPE: ICD-10-CM

## 2023-01-09 RX ORDER — POLYETHYLENE GLYCOL 3350 17 G/17G
POWDER, FOR SOLUTION ORAL
Qty: 510 G | Refills: 1 | Status: SHIPPED | OUTPATIENT
Start: 2023-01-09 | End: 2023-03-15

## 2023-01-17 DIAGNOSIS — R60.0 PERIPHERAL EDEMA: ICD-10-CM

## 2023-01-19 RX ORDER — FUROSEMIDE 40 MG
TABLET ORAL
Qty: 100 TABLET | Refills: 3 | Status: SHIPPED | OUTPATIENT
Start: 2023-01-19 | End: 2023-12-04

## 2023-02-14 DIAGNOSIS — K59.00 CONSTIPATION, UNSPECIFIED CONSTIPATION TYPE: ICD-10-CM

## 2023-02-14 RX ORDER — DOCUSATE SODIUM 100 MG/1
CAPSULE, LIQUID FILLED ORAL
Qty: 90 CAPSULE | Refills: 3 | Status: SHIPPED | OUTPATIENT
Start: 2023-02-14 | End: 2023-12-04

## 2023-02-14 NOTE — TELEPHONE ENCOUNTER
[facility] sent Rx request for the following:    DOCUSATE SODIUM 100MG SOFTGEL  Last Prescription Date:   11/21/22  Last Fill Qty/Refills:         90, R-0    Last Office Visit:              11/21/22   Future Office visit:           None   Maribel Hall RN on 2/14/2023 at 1:16 PM

## 2023-03-01 ENCOUNTER — TELEPHONE (OUTPATIENT)
Dept: FAMILY MEDICINE | Facility: OTHER | Age: 78
End: 2023-03-01
Payer: COMMERCIAL

## 2023-03-01 NOTE — TELEPHONE ENCOUNTER
Jaz from [employer] called to inquire whether patient's PCP could be changed to [facility] without an appointment. He is not due for an annual exam or med renewal at this time. Please advise.    Angela Joyce on 3/1/2023 at 1:21 PM

## 2023-03-02 NOTE — TELEPHONE ENCOUNTER
updated to patient's PCP in [facility]. LM for Jaz to call back to update her.  Britney Flores LPN

## 2023-03-10 NOTE — TELEPHONE ENCOUNTER
Multiple attempts to reach Jaz to inform her of providers note. Unable to reach Jaz.  Britney Flores LPN

## 2023-03-14 DIAGNOSIS — K59.00 CONSTIPATION, UNSPECIFIED CONSTIPATION TYPE: ICD-10-CM

## 2023-03-15 RX ORDER — POLYETHYLENE GLYCOL 3350 17 G/17G
POWDER, FOR SOLUTION ORAL
Qty: 510 G | Refills: 1 | Status: SHIPPED | OUTPATIENT
Start: 2023-03-15 | End: 2023-05-05

## 2023-03-15 NOTE — TELEPHONE ENCOUNTER
"  Last Prescription Date: 01/09/2023  Last Qty/Refills: 510 / 1  Last Office Visit: 11/21/2022  Future Office Visit: none     Requested Prescriptions   Pending Prescriptions Disp Refills     polyethylene glycol (MIRALAX) 17 GM/Dose powder [Pharmacy Med Name: PEG 3350 17G PWD FOR SOLN] 510 g 1     Sig: DISSOLVE 17GM (1 CAPFUL) INLIQUID AND DRINK BY MOUTHDAILY       Laxatives Protocol Passed - 3/14/2023 12:45 PM        Passed - Patient is age 6 or older        Passed - Recent (12 mo) or future (30 days) visit within the authorizing provider's specialty     Patient has had an office visit with the authorizing provider or a provider within the authorizing providers department within the previous 12 mos or has a future within next 30 days. See \"Patient Info\" tab in inbasket, or \"Choose Columns\" in Meds & Orders section of the refill encounter.              Passed - Medication is active on med list             "

## 2023-04-17 DIAGNOSIS — F79 INTELLECTUAL DISABILITY: ICD-10-CM

## 2023-04-19 NOTE — TELEPHONE ENCOUNTER
Nadeem Jarrett sent Rx request for the following:      Requested Prescriptions   Pending Prescriptions Disp Refills     OLANZapine (ZYPREXA) 5 MG tablet [Pharmacy Med Name: OLANZAPINE 5MG TABLET] 90 tablet 3     Sig: TAKE 1 TABLET BY MOUTH DAILY AT 8 PM       Antipsychotic Medications Failed - 4/19/2023  4:00 PM        Failed - Lipid panel on file within the past 12 months     No lab results found.           Last Prescription Date:   5/23/2022  Last Fill Qty/Refills:         90, R-3    Last Office Visit:              9/6/2022   Future Office visit:           None    Cinda Santa RN on 4/19/2023 at 4:01 PM

## 2023-04-20 RX ORDER — OLANZAPINE 5 MG/1
TABLET ORAL
Qty: 90 TABLET | Refills: 3 | Status: SHIPPED | OUTPATIENT
Start: 2023-04-20 | End: 2023-12-04

## 2023-05-05 DIAGNOSIS — K59.00 CONSTIPATION, UNSPECIFIED CONSTIPATION TYPE: ICD-10-CM

## 2023-05-05 RX ORDER — POLYETHYLENE GLYCOL 3350 17 G/17G
POWDER, FOR SOLUTION ORAL
Qty: 1530 G | Refills: 1 | Status: SHIPPED | OUTPATIENT
Start: 2023-05-05 | End: 2023-11-16

## 2023-05-05 RX ORDER — POLYETHYLENE GLYCOL 3350 17 G/17G
POWDER, FOR SOLUTION ORAL
Qty: 510 G | Refills: 1 | OUTPATIENT
Start: 2023-05-05

## 2023-05-05 NOTE — TELEPHONE ENCOUNTER
[id number] sent Rx request for the following:      Requested Prescriptions   Pending Prescriptions Disp Refills     polyethylene glycol (MIRALAX) 17 GM/Dose powder 1530 g 1     Sig: DISSOLVE 17GM (1 CAPFUL) INLIQUID AND DRINK BY MOUTHDAILY       Laxatives Protocol Failed - 5/5/2023  4:13 PM        Failed - Recent (12 mo) or future (30 days) visit within the authorizing provider's specialty     Last Prescription Date:   3/15/23  Last Fill Qty/Refills:         510 g, R-1    Last Office Visit:              11/21/22 (Dr. Gallego)   Future Office visit:           None    Pooja Carlos RN .............. 5/5/2023  4:25 PM

## 2023-05-17 ENCOUNTER — TELEPHONE (OUTPATIENT)
Dept: FAMILY MEDICINE | Facility: OTHER | Age: 78
End: 2023-05-17
Payer: COMMERCIAL

## 2023-05-17 DIAGNOSIS — F69 BEHAVIOR PROBLEM, ADULT: ICD-10-CM

## 2023-05-17 RX ORDER — LORAZEPAM 2 MG/1
TABLET ORAL
Qty: 5 TABLET | Refills: 0 | Status: SHIPPED | OUTPATIENT
Start: 2023-05-17 | End: 2023-08-31

## 2023-05-17 NOTE — TELEPHONE ENCOUNTER
TJP-Reason for call: Medication or medication refill    Name of medication requested: Lorazepam    Are you out of the medication? yes    What pharmacy do you use? [facility]    Preferred method for responding to this message: Telephone Call    Phone number patient can be reached at: Other phone number:  [phone]    If we cannot reach you directly, may we leave a detailed response at the number you provided? Yes

## 2023-05-22 ENCOUNTER — TELEPHONE (OUTPATIENT)
Dept: CARDIOLOGY | Facility: CLINIC | Age: 78
End: 2023-05-22
Payer: COMMERCIAL

## 2023-05-22 NOTE — TELEPHONE ENCOUNTER
Pt called in regards to setting up GC appt with Yarelis Ross. pts father was recently tested. Reached out to pt to schedule, No answer, LVM

## 2023-06-01 DIAGNOSIS — N39.41 URGE INCONTINENCE OF URINE: ICD-10-CM

## 2023-06-01 RX ORDER — TAMSULOSIN HYDROCHLORIDE 0.4 MG/1
0.4 CAPSULE ORAL DAILY
Qty: 90 CAPSULE | Refills: 3 | Status: SHIPPED | OUTPATIENT
Start: 2023-06-01 | End: 2023-12-04

## 2023-06-30 DIAGNOSIS — K59.00 CONSTIPATION, UNSPECIFIED CONSTIPATION TYPE: ICD-10-CM

## 2023-06-30 DIAGNOSIS — M10.9 GOUT, UNSPECIFIED CAUSE, UNSPECIFIED CHRONICITY, UNSPECIFIED SITE: ICD-10-CM

## 2023-07-03 RX ORDER — SENNOSIDES 8.6 MG/1
TABLET ORAL
Qty: 180 TABLET | Refills: 1 | Status: SHIPPED | OUTPATIENT
Start: 2023-07-03 | End: 2024-06-24

## 2023-07-03 RX ORDER — ALLOPURINOL 300 MG/1
TABLET ORAL
Qty: 90 TABLET | Refills: 3 | Status: SHIPPED | OUTPATIENT
Start: 2023-07-03 | End: 2023-12-04

## 2023-07-21 DIAGNOSIS — K59.03 DRUG-INDUCED CONSTIPATION: ICD-10-CM

## 2023-07-21 RX ORDER — LUBIPROSTONE 24 UG/1
CAPSULE ORAL
Qty: 90 CAPSULE | Refills: 1 | Status: SHIPPED | OUTPATIENT
Start: 2023-07-21 | End: 2023-12-04

## 2023-07-21 NOTE — TELEPHONE ENCOUNTER
[facility] sent Rx request for the following:    LUBIPROSTONE 24MCG CAPSULE  Last Prescription Date:   11/29/22  Last Fill Qty/Refills:         90, R-1    Last Office Visit:              11/21/22   Future Office visit:           None     Maribel Hall RN on 7/21/2023 at 1:07 PM

## 2023-08-28 DIAGNOSIS — F69 BEHAVIOR PROBLEM, ADULT: ICD-10-CM

## 2023-08-31 RX ORDER — LORAZEPAM 2 MG/1
TABLET ORAL
Qty: 5 TABLET | Refills: 0 | Status: SHIPPED | OUTPATIENT
Start: 2023-08-31 | End: 2023-11-02

## 2023-08-31 NOTE — TELEPHONE ENCOUNTER
[facility] sent Rx request for the following:      Requested Prescriptions   Pending Prescriptions Disp Refills    LORazepam (ATIVAN) 2 MG tablet [Pharmacy Med Name: LORAZEPAM 2MG TABLET] 5 tablet 0     Sig: TAKE 1 TABLET BY MOUTH DAILY AS NEEDED PRIOR TO APPOINTMENT OR FOR BEHAVIORAL CONCERN       There is no refill protocol information for this order          Last Prescription Date:   5/17/23  Last Fill Qty/Refills:         5, R-0    Last Office Visit:              11/21/22   Future Office visit:           None scheduled    Sending to provider as medication is not on the RN refill protocol.    Iris Quinones RN on 8/31/2023 at 10:18 AM

## 2023-09-12 DIAGNOSIS — E87.6 HYPOKALEMIA: ICD-10-CM

## 2023-09-18 DIAGNOSIS — Q87.40 MARFAN'S SYNDROME: ICD-10-CM

## 2023-09-18 RX ORDER — POTASSIUM CHLORIDE 20 MEQ/15ML
SOLUTION ORAL
Qty: 473 ML | Refills: 3 | Status: SHIPPED | OUTPATIENT
Start: 2023-09-18 | End: 2023-12-04

## 2023-09-18 NOTE — TELEPHONE ENCOUNTER
"Called and spoke to Jaz at [facility], and they report that the patient has very little of the potassium left.      Last Prescription Date: 1/23/2023  Last Qty/Refills: 473 mL / R-3  Last Office Visit: 11/21/2022  Future Office Visit: None    Mel Richards RN on 9/18/2023 at 10:55 AM     Requested Prescriptions   Pending Prescriptions Disp Refills    potassium chloride (KAYCIEL) 10 % SOLN solution [Pharmacy Med Name: POTASSIUM CL 20MEQ/15ML SOLN] 473 mL 3     Sig: GIVE 7.5MLS (10MEQ) BY MOUTH ONCE DAILY       Potassium Supplements Protocol Failed - 9/12/2023  9:36 AM        Failed - Recent (12 mo) or future (30 days) visit within the authorizing provider's department     Patient has had an office visit with the authorizing provider or a provider within the authorizing providers department within the previous 12 mos or has a future within next 30 days. See \"Patient Info\" tab in inbasket, or \"Choose Columns\" in Meds & Orders section of the refill encounter.           "

## 2023-09-20 NOTE — TELEPHONE ENCOUNTER
"[facility] sent Rx request for the following:      Requested Prescriptions   Pending Prescriptions Disp Refills    busPIRone (BUSPAR) 7.5 MG tablet [Pharmacy Med Name: BUSPIRONE 7.5MG TABLET] 90 tablet 11     Sig: TAKE 1 TABLET (7.5 MG) BY MOUTH 3 TIMES DAILY       Atypical Antidepressants Protocol Failed - 9/18/2023  1:13 PM        Failed - Recent (12 mo) or future (30 days) visit within the authorizing provider's specialty     Patient has had an office visit with the authorizing provider or a provider within the authorizing providers department within the previous 12 mos or has a future within next 30 days. See \"Patient Info\" tab in inbasket, or \"Choose Columns\" in Meds & Orders section of the refill encounter.              Passed - Medication active on med list        Passed - Patient is age 18 or older             Last Prescription Date:   10/19/22  Last Fill Qty/Refills:         90, R-11    Last Office Visit:              11/21/22 MAF   Future Office visit:           None scheduled    MWV due 11/21/23 or later.    Iris Quinones RN on 9/20/2023 at 3:48 PM         "

## 2023-09-21 RX ORDER — BUSPIRONE HYDROCHLORIDE 7.5 MG/1
7.5 TABLET ORAL 3 TIMES DAILY
Qty: 90 TABLET | Refills: 11 | Status: SHIPPED | OUTPATIENT
Start: 2023-09-21 | End: 2023-12-04

## 2023-10-31 DIAGNOSIS — F69 BEHAVIOR PROBLEM, ADULT: ICD-10-CM

## 2023-11-02 RX ORDER — LORAZEPAM 2 MG/1
TABLET ORAL
Qty: 5 TABLET | Refills: 0 | Status: SHIPPED | OUTPATIENT
Start: 2023-11-02 | End: 2024-03-01

## 2023-11-02 NOTE — TELEPHONE ENCOUNTER
[facility] sent Rx request for the following:      Requested Prescriptions   Pending Prescriptions Disp Refills    LORazepam (ATIVAN) 2 MG tablet [Pharmacy Med Name: LORAZEPAM 2MG TABLET] 5 tablet 0     Sig: TAKE 1 TABLET BY MOUTH DAILY AS NEEDED PRIOR TO APPOINTMENT OR FOR BEHAVIORAL CONCERN       There is no refill protocol information for this order          Last Prescription Date:   8/31/23  Last Fill Qty/Refills:         5, R-0    Last Office Visit:              11/21/22   Future Office visit:           12/4/23    Iris Quinones RN on 11/2/2023 at 10:21 AM

## 2023-11-14 DIAGNOSIS — K59.00 CONSTIPATION, UNSPECIFIED CONSTIPATION TYPE: ICD-10-CM

## 2023-11-16 RX ORDER — POLYETHYLENE GLYCOL 3350 17 G/17G
POWDER, FOR SOLUTION ORAL
Qty: 850 G | Refills: 4 | Status: SHIPPED | OUTPATIENT
Start: 2023-11-16

## 2023-11-16 NOTE — TELEPHONE ENCOUNTER
[facility] sent Rx request for the following:      Requested Prescriptions   Pending Prescriptions Disp Refills    polyethylene glycol (MIRALAX) 17 GM/Dose powder [Pharmacy Med Name: PEG 3350 17G PWD FOR SOLN]  1     Sig: DISSOLVE 17GM (1 CAPFUL) INLIQUID AND DRINK BY MOUTHDAILY       Laxatives Protocol Failed - 11/14/2023  8:43 AM     Last Prescription Date:   5/5/23  Last Fill Qty/Refills:         1530 g, R-1    Last Office Visit:              11/21/22   Future Office visit:           12/4/23    Maribel Hall RN on 11/16/2023 at 3:36 PM

## 2023-12-04 ENCOUNTER — OFFICE VISIT (OUTPATIENT)
Dept: FAMILY MEDICINE | Facility: OTHER | Age: 78
End: 2023-12-04
Attending: FAMILY MEDICINE
Payer: COMMERCIAL

## 2023-12-04 VITALS
BODY MASS INDEX: 22.81 KG/M2 | SYSTOLIC BLOOD PRESSURE: 110 MMHG | OXYGEN SATURATION: 97 % | HEIGHT: 68 IN | RESPIRATION RATE: 16 BRPM | DIASTOLIC BLOOD PRESSURE: 66 MMHG | TEMPERATURE: 98.7 F | HEART RATE: 118 BPM

## 2023-12-04 DIAGNOSIS — K59.03 DRUG-INDUCED CONSTIPATION: ICD-10-CM

## 2023-12-04 DIAGNOSIS — M10.9 GOUT, UNSPECIFIED CAUSE, UNSPECIFIED CHRONICITY, UNSPECIFIED SITE: ICD-10-CM

## 2023-12-04 DIAGNOSIS — Z29.11 NEED FOR VACCINATION AGAINST RESPIRATORY SYNCYTIAL VIRUS: ICD-10-CM

## 2023-12-04 DIAGNOSIS — Z23 NEED FOR TDAP VACCINATION: ICD-10-CM

## 2023-12-04 DIAGNOSIS — R60.9 EDEMA, UNSPECIFIED TYPE: ICD-10-CM

## 2023-12-04 DIAGNOSIS — N39.41 URGE INCONTINENCE OF URINE: ICD-10-CM

## 2023-12-04 DIAGNOSIS — R60.0 PERIPHERAL EDEMA: ICD-10-CM

## 2023-12-04 DIAGNOSIS — K59.00 CONSTIPATION, UNSPECIFIED CONSTIPATION TYPE: ICD-10-CM

## 2023-12-04 DIAGNOSIS — R73.09 ELEVATED GLUCOSE: ICD-10-CM

## 2023-12-04 DIAGNOSIS — Q87.40 MARFAN'S SYNDROME: ICD-10-CM

## 2023-12-04 DIAGNOSIS — Z00.00 ENCOUNTER FOR MEDICARE ANNUAL WELLNESS EXAM: Primary | ICD-10-CM

## 2023-12-04 DIAGNOSIS — E87.6 HYPOKALEMIA: ICD-10-CM

## 2023-12-04 DIAGNOSIS — F79 INTELLECTUAL DISABILITY: ICD-10-CM

## 2023-12-04 PROBLEM — I71.9 AORTIC ANEURYSM (H): Status: RESOLVED | Noted: 2018-02-12 | Resolved: 2023-12-04

## 2023-12-04 LAB
ALBUMIN SERPL BCG-MCNC: 4.1 G/DL (ref 3.5–5.2)
ALP SERPL-CCNC: 132 U/L (ref 40–150)
ALT SERPL W P-5'-P-CCNC: 12 U/L (ref 0–70)
ANION GAP SERPL CALCULATED.3IONS-SCNC: 13 MMOL/L (ref 7–15)
AST SERPL W P-5'-P-CCNC: 19 U/L (ref 0–45)
BILIRUB SERPL-MCNC: 1.2 MG/DL
BUN SERPL-MCNC: 13.2 MG/DL (ref 8–23)
CALCIUM SERPL-MCNC: 9.6 MG/DL (ref 8.8–10.2)
CHLORIDE SERPL-SCNC: 100 MMOL/L (ref 98–107)
CREAT SERPL-MCNC: 0.78 MG/DL (ref 0.67–1.17)
DEPRECATED HCO3 PLAS-SCNC: 25 MMOL/L (ref 22–29)
EGFRCR SERPLBLD CKD-EPI 2021: >90 ML/MIN/1.73M2
GLUCOSE SERPL-MCNC: 185 MG/DL (ref 70–99)
POTASSIUM SERPL-SCNC: 3.8 MMOL/L (ref 3.4–5.3)
PROT SERPL-MCNC: 7.3 G/DL (ref 6.4–8.3)
SODIUM SERPL-SCNC: 138 MMOL/L (ref 135–145)
URATE SERPL-MCNC: 3.4 MG/DL (ref 3.4–7)

## 2023-12-04 PROCEDURE — G0439 PPPS, SUBSEQ VISIT: HCPCS | Performed by: FAMILY MEDICINE

## 2023-12-04 PROCEDURE — 91320 SARSCV2 VAC 30MCG TRS-SUC IM: CPT

## 2023-12-04 PROCEDURE — 36415 COLL VENOUS BLD VENIPUNCTURE: CPT | Mod: ZL | Performed by: FAMILY MEDICINE

## 2023-12-04 PROCEDURE — 84550 ASSAY OF BLOOD/URIC ACID: CPT | Mod: ZL | Performed by: FAMILY MEDICINE

## 2023-12-04 PROCEDURE — 99213 OFFICE O/P EST LOW 20 MIN: CPT | Mod: 25 | Performed by: FAMILY MEDICINE

## 2023-12-04 PROCEDURE — G0463 HOSPITAL OUTPT CLINIC VISIT: HCPCS | Mod: 25

## 2023-12-04 PROCEDURE — G0463 HOSPITAL OUTPT CLINIC VISIT: HCPCS

## 2023-12-04 PROCEDURE — 84450 TRANSFERASE (AST) (SGOT): CPT | Mod: ZL | Performed by: FAMILY MEDICINE

## 2023-12-04 RX ORDER — POTASSIUM CHLORIDE 750 MG/1
10 TABLET, EXTENDED RELEASE ORAL AT BEDTIME
Qty: 90 TABLET | Refills: 3 | Status: SHIPPED | OUTPATIENT
Start: 2023-12-04 | End: 2023-12-07

## 2023-12-04 RX ORDER — OLANZAPINE 5 MG/1
TABLET ORAL
Qty: 90 TABLET | Refills: 3 | Status: SHIPPED | OUTPATIENT
Start: 2023-12-04

## 2023-12-04 RX ORDER — LUBIPROSTONE 24 UG/1
24 CAPSULE ORAL
Qty: 90 CAPSULE | Refills: 4 | Status: SHIPPED | OUTPATIENT
Start: 2023-12-04

## 2023-12-04 RX ORDER — DOCUSATE SODIUM 100 MG/1
100 CAPSULE, LIQUID FILLED ORAL DAILY
Qty: 100 CAPSULE | Refills: 0 | Status: SHIPPED | OUTPATIENT
Start: 2023-12-04 | End: 2024-02-20

## 2023-12-04 RX ORDER — TAMSULOSIN HYDROCHLORIDE 0.4 MG/1
0.4 CAPSULE ORAL DAILY
Qty: 90 CAPSULE | Refills: 3 | Status: SHIPPED | OUTPATIENT
Start: 2023-12-04

## 2023-12-04 RX ORDER — BUSPIRONE HYDROCHLORIDE 7.5 MG/1
7.5 TABLET ORAL 3 TIMES DAILY
Qty: 90 TABLET | Refills: 11 | Status: SHIPPED | OUTPATIENT
Start: 2023-12-04

## 2023-12-04 RX ORDER — ALLOPURINOL 300 MG/1
1 TABLET ORAL EVERY MORNING
Qty: 90 TABLET | Refills: 4 | Status: SHIPPED | OUTPATIENT
Start: 2023-12-04

## 2023-12-04 RX ORDER — RESPIRATORY SYNCYTIAL VIRUS VACCINE 120MCG/0.5
0.5 KIT INTRAMUSCULAR ONCE
Qty: 1 EACH | Refills: 0 | Status: SHIPPED | OUTPATIENT
Start: 2023-12-04 | End: 2023-12-04

## 2023-12-04 RX ORDER — FUROSEMIDE 40 MG
TABLET ORAL
Qty: 100 TABLET | Refills: 3 | Status: SHIPPED | OUTPATIENT
Start: 2023-12-04

## 2023-12-04 ASSESSMENT — ENCOUNTER SYMPTOMS
COUGH: 0
EYE PAIN: 0
PARESTHESIAS: 0
DIARRHEA: 0
HEADACHES: 0
CHILLS: 0
DYSURIA: 0
HEMATURIA: 0
JOINT SWELLING: 0
NERVOUS/ANXIOUS: 0
ARTHRALGIAS: 0
ABDOMINAL PAIN: 0
SHORTNESS OF BREATH: 0
FREQUENCY: 0
CONSTIPATION: 0
NAUSEA: 0
WEAKNESS: 0
DIZZINESS: 0
PALPITATIONS: 0
HEMATOCHEZIA: 0
HEARTBURN: 0
SORE THROAT: 0
FEVER: 0
MYALGIAS: 0

## 2023-12-04 ASSESSMENT — ACTIVITIES OF DAILY LIVING (ADL)
CURRENT_FUNCTION: LAUNDRY REQUIRES ASSISTANCE
CURRENT_FUNCTION: SHOPPING REQUIRES ASSISTANCE
CURRENT_FUNCTION: TRANSPORTATION REQUIRES ASSISTANCE
CURRENT_FUNCTION: HOUSEWORK REQUIRES ASSISTANCE
CURRENT_FUNCTION: PREPARING MEALS REQUIRES ASSISTANCE
CURRENT_FUNCTION: BATHING REQUIRES ASSISTANCE
CURRENT_FUNCTION: MONEY MANAGEMENT REQUIRES ASSISTANCE
CURRENT_FUNCTION: TELEPHONE REQUIRES ASSISTANCE
CURRENT_FUNCTION: MEDICATION ADMINISTRATION REQUIRES ASSISTANCE

## 2023-12-04 NOTE — PATIENT INSTRUCTIONS
"Patient Education   Personalized Prevention Plan  You are due for the preventive services outlined below.  Your care team is available to assist you in scheduling these services.  If you have already completed any of these items, please share that information with your care team to update in your medical record.  Health Maintenance Due   Topic Date Due     LUNG CANCER SCREENING  Never done     RSV VACCINE (Pregnancy & 60+) (1 - 1-dose 60+ series) Never done     Diptheria Tetanus Pertussis (DTAP/TDAP/TD) Vaccine (2 - Td or Tdap) 07/22/2023     COVID-19 Vaccine (5 - 2023-24 season) 09/01/2023     Learning About Being Physically Active  What is physical activity?     Being physically active means doing any kind of activity that gets your body moving.  The types of physical activity that can help you get fit and stay healthy include:  Aerobic or \"cardio\" activities. These make your heart beat faster and make you breathe harder, such as brisk walking, riding a bike, or running. They strengthen your heart and lungs and build up your endurance.  Strength training activities. These make your muscles work against, or \"resist,\" something. Examples include lifting weights or doing push-ups. These activities help tone and strengthen your muscles and bones.  Stretches. These let you move your joints and muscles through their full range of motion. Stretching helps you be more flexible.  Reaching a balance between these three types of physical activity is important because each one contributes to your overall fitness.  What are the benefits of being active?  Being active is one of the best things you can do for your health. It helps you to:  Feel stronger and have more energy to do all the things you like to do.  Focus better at school or work.  Feel, think, and sleep better.  Reach and stay at a healthy weight.  Lose fat and build lean muscle.  Lower your risk for serious health problems, including diabetes, heart attack, high " Patient/Caregiver provided printed discharge information. "blood pressure, and some cancers.  Keep your heart, lungs, bones, muscles, and joints strong and healthy.  How can you make being active part of your life?  Start slowly. Make it your long-term goal to get at least 30 minutes of exercise on most days of the week. Walking is a good choice. You also may want to do other activities, such as running, swimming, cycling, or playing tennis or team sports.  Pick activities that you like--ones that make your heart beat faster, your muscles stronger, and your muscles and joints more flexible. If you find more than one thing you like doing, do them all. You don't have to do the same thing every day.  Get your heart pumping every day. Any activity that makes your heart beat faster and keeps it at that rate for a while counts.  Here are some great ways to get your heart beating faster:  Go for a brisk walk, run, or hike.  Go for a swim or bike ride.  Take an online exercise class or dance.  Play a game of touch football, basketball, or soccer.  Play tennis, pickleball, or racquetball.  Climb stairs.  Even some household chores can be aerobic. Just do them at a faster pace. Raking or mowing the lawn, sweeping the garage, and vacuuming and cleaning your home all can help get your heart rate up.  Strengthen your muscles during the week. You don't have to lift heavy weights or grow big, bulky muscles to get stronger. Doing a few simple activities that make your muscles work against, or \"resist,\" something can help you get stronger. Aim for at least twice a week.  For example, you can:  Do push-ups or sit-ups, which use your own body weight as resistance.  Lift weights or dumbbells or use stretch bands at home or in a gym or community center.  Stretch your muscles often. Stretching will help you as you become more active. It can help you stay flexible and loosen tight muscles. It can also help improve your balance and posture and can be a great way to relax.  Be sure to stretch the " "muscles you'll be using when you work out. It's best to warm your muscles slightly before you stretch them. Walk or do some other light aerobic activity for a few minutes. Then start stretching.  When you stretch your muscles:  Do it slowly. Stretching is not about going fast or making sudden movements.  Don't push or bounce during a stretch.  Hold each stretch for at least 15 to 30 seconds, if you can. You should feel a stretch in the muscle, but not pain.  Breathe out as you do the stretch. Then breathe in as you hold the stretch. Don't hold your breath.  If you're worried about how more activity might affect your health, have a checkup before you start. Follow any special advice your doctor gives you for getting a smart start.  Where can you learn more?  Go to https://www.[employer].net/patiented  Enter W332 in the search box to learn more about \"Learning About Being Physically Active.\"  Current as of: June 6, 2023               Content Version: 13.8    1121-4904 [employer].   Care instructions adapted under license by your healthcare professional. If you have questions about a medical condition or this instruction, always ask your healthcare professional. [employer] disclaims any warranty or liability for your use of this information.      Activities of Daily Living    Your Health Risk Assessment indicates you have difficulties with activities of daily living such as housework, bathing, preparing meals, taking medication, etc. Please make a follow up appointment for us to address this issue in more detail.     "

## 2023-12-04 NOTE — PROGRESS NOTES
"He is at risk for lack of exercise and has been provided with information to increase physical activity for the benefit of his well-being.  The patient reports that he has difficulty with activities of daily living. Lives in a group home where this is provided for him  Answers submitted by the patient for this visit:  Annual Preventive Visit (Submitted on 12/4/2023)  Chief Complaint: Annual Exam:  In general, how would you rate your overall physical health?: good  Frequency of exercise:: None  Do you usually eat at least 4 servings of fruit and vegetables a day, include whole grains & fiber, and avoid regularly eating high fat or \"junk\" foods? : Yes  Taking medications regularly:: Yes  Medication side effects:: None  Activities of Daily Living: telephone requires assistance, transportation requires assistance, shopping requires assistance, preparing meals requires assistance, housework requires assistance, bathing requires assistance, laundry requires assistance, medication administration requires assistance, money management requires assistance  Home safety: no safety concerns identified  Hearing Impairment:: no hearing concerns  In the past 6 months, have you been bothered by leaking of urine?: No  abdominal pain: No  Blood in stool: No  Blood in urine: No  chest pain: No  chills: No  congestion: No  constipation: No  cough: No  diarrhea: No  dizziness: No  ear pain: No  eye pain: No  nervous/anxious: No  fever: No  frequency: No  genital sores: No  headaches: No  hearing loss: No  heartburn: No  arthralgias: No  joint swelling: No  peripheral edema: No  mood changes: No  myalgias: No  nausea: No  dysuria: No  palpitations: No  Skin sensation changes: No  sore throat: No  urgency: No  rash: No  shortness of breath: No  visual disturbance: No  weakness: No  impotence: No  penile discharge: No  In general, how would you rate your overall mental or emotional health?: good  Additional concerns today:: No    "

## 2023-12-04 NOTE — NURSING NOTE
"Chief Complaint   Patient presents with    Medicare Visit       Initial /66   Pulse 118   Temp 98.7  F (37.1  C) (Tympanic)   Resp 16   Ht 1.727 m (5' 8\")   SpO2 97%   BMI 22.81 kg/m   Estimated body mass index is 22.81 kg/m  as calculated from the following:    Height as of this encounter: 1.727 m (5' 8\").    Weight as of 10/11/22: 68 kg (150 lb).  Medication Reconciliation: complete          "

## 2023-12-04 NOTE — PROGRESS NOTES
"SUBJECTIVE:   Eliezer is a 78 year old, presenting for the following:  Medicare Visit        12/4/2023    10:52 AM   Additional Questions   Roomed by SAKSHI Tan   Accompanied by Staff         12/4/2023    10:52 AM   Patient Reported Additional Medications   Patient reports taking the following new medications N/A       Are you in the first 12 months of your Medicare coverage?  No    Healthy Habits:     In general, how would you rate your overall health?  Good    Frequency of exercise:  None    Do you usually eat at least 4 servings of fruit and vegetables a day, include whole grains    & fiber and avoid regularly eating high fat or \"junk\" foods?  Yes    Taking medications regularly:  Yes    Medication side effects:  None    Ability to successfully perform activities of daily living:  Telephone requires assistance, transportation requires assistance, shopping requires assistance, preparing meals requires assistance, housework requires assistance, bathing requires assistance, laundry requires assistance, medication administration requires assistance and money management requires assistance    Home Safety:  No safety concerns identified    Hearing Impairment:  No hearing concerns    In the past 6 months, have you been bothered by leaking of urine?  No    In general, how would you rate your overall mental or emotional health?  Good    Additional concerns today:  No      Today's PHQ-2 Score:       12/4/2023    10:46 AM   PHQ-2 ( 1999 [employer])   Q1: Little interest or pleasure in doing things 0   Q2: Feeling down, depressed or hopeless 0   PHQ-2 Score 0   Q1: Little interest or pleasure in doing things Not at all   Q2: Feeling down, depressed or hopeless Not at all   PHQ-2 Score 0           Have you ever done Advance Care Planning? (For example, a Health Directive, POLST, or a discussion with a medical provider or your loved ones about your wishes): Yes, advance care planning is on file.       Fall risk  Fallen 2 or more " times in the past year?: No  Any fall with injury in the past year?: No    Cognitive Screening Not appropriate due to mental handicap    Do you have sleep apnea, excessive snoring or daytime drowsiness? : no    Reviewed and updated as needed this visit by clinical staff   Tobacco  Allergies  Meds   Med Hx  Surg Hx  Fam Hx  Soc Hx        Reviewed and updated as needed this visit by Provider                 Social History     Tobacco Use     Smoking status: Former     Packs/day: 0     Types: Cigarettes     Quit date: 2004     Years since quittin.8     Smokeless tobacco: Never   Substance Use Topics     Alcohol use: No             2023    10:46 AM   Alcohol Use   Prescreen: >3 drinks/day or >7 drinks/week? Not Applicable     Do you have a current opioid prescription? No  Do you use any other controlled substances or medications that are not prescribed by a provider? None              Current providers sharing in care for this patient include:    Patient Care Team:  Arvin Cooper MD as PCP - General (Family Medicine)  Carlitos Levy MD as Assigned Surgical Provider    The following health maintenance items are reviewed in Epic and correct as of today:  Health Maintenance   Topic Date Due     LUNG CANCER SCREENING  Never done     RSV VACCINE (Pregnancy & 60+) (1 - 1-dose 60+ series) Never done     DTAP/TDAP/TD IMMUNIZATION (2 - Td or Tdap) 2023     COVID-19 Vaccine (2023- season) 2023     MEDICARE ANNUAL WELLNESS VISIT  2023     FALL RISK ASSESSMENT  2024     ADVANCE CARE PLANNING  2026     PHQ-2 (once per calendar year)  Completed     INFLUENZA VACCINE  Completed     Pneumococcal Vaccine: 65+ Years  Completed     HEPATITIS C SCREENING  Addressed     IPV IMMUNIZATION  Aged Out     HPV IMMUNIZATION  Aged Out     MENINGITIS IMMUNIZATION  Aged Out     RSV MONOCLONAL ANTIBODY  Aged Out     LIPID  Discontinued     COLORECTAL CANCER SCREENING  Discontinued     ZOSTER  IMMUNIZATION  Discontinued     Current Outpatient Medications   Medication Sig Dispense Refill     allopurinol (ZYLOPRIM) 300 MG tablet Take 1 tablet (300 mg) by mouth every morning 90 tablet 4     busPIRone (BUSPAR) 7.5 MG tablet Take 1 tablet (7.5 mg) by mouth 3 times daily 90 tablet 11     docusate sodium (COLACE) 100 MG capsule Take 1 capsule (100 mg) by mouth daily 100 capsule 0     furosemide (LASIX) 40 MG tablet TAKE 1 TAB BY MOUTH ON TUE THR SAT  tablet 3     EVIE-GAEL 8.6 MG tablet TAKE 1 TABLET BY MOUTH DAILY 180 tablet 1     haloperidol (HALDOL) 5 MG tablet TAKE ONE TABLET BY MOUTH AS NEEDED PRIOR TO APPT (MAX 1 PER DAY) 15 tablet 1     Incontinence Supply Disposable MISC ATTENDS ADV UDWR SZ:LARGE PC #[id number] 18/PP [facility]39.41 [facility]  [facility]/8PKGS/29DS 150 each 10     latanoprost (XALATAN) 0.005 % ophthalmic solution Place 1 drop into both eyes At Bedtime       LORazepam (ATIVAN) 2 MG tablet TAKE 1 TABLET BY MOUTH DAILY AS NEEDED PRIOR TO APPOINTMENT OR FOR BEHAVIORAL CONCERN 5 tablet 0     lubiprostone (AMITIZA) 24 MCG capsule Take 1 capsule (24 mcg) by mouth daily (with breakfast) 90 capsule 4     OLANZapine (ZYPREXA) 5 MG tablet TAKE 1 TABLET BY MOUTH DAILY AT 8 PM 90 tablet 3     omeprazole (PRILOSEC) 20 MG DR capsule TAKE 1 CAPSULE BY MOUTH DAILY 180 capsule 2     polyethylene glycol (MIRALAX) 17 GM/Dose powder DISSOLVE 17GM (1 CAPFUL) INLIQUID AND DRINK BY MOUTHDAILY 850 g 4     potassium chloride ER (K-TAB/KLOR-CON) 10 MEQ CR tablet Take 1 tablet (10 mEq) by mouth at bedtime 90 tablet 3     psyllium 30.9 % POWD Take 1 Tablespoonful by mouth daily Mix in 8 ounces of water or orange juice. 1 Bottle 6     respiratory syncytial virus vaccine, bivalent (ABRYSVO) injection Inject 0.5 mLs into the muscle once for 1 dose 1 each 0     tamsulosin (FLOMAX) 0.4 MG capsule Take 1 capsule (0.4 mg) by mouth daily 90 capsule 3     Tdap, tetanus-diptheria-acell pertussis, (BOOSTRIX) 5-2.5-18.5 LF-MCG/0.5  "DONTAE injection Inject 0.5 mLs into the muscle once for 1 dose 0.5 mL 0     timolol maleate (TIMOPTIC) 0.5 % ophthalmic solution INSTILL 1 DROP INTO BOTH EYES ONCE A DAY       No Known Allergies          Review of Systems   Constitutional:  Negative for chills and fever.   HENT:  Negative for congestion, ear pain, hearing loss and sore throat.    Eyes:  Negative for pain and visual disturbance.   Respiratory:  Negative for cough and shortness of breath.    Cardiovascular:  Negative for chest pain, palpitations and peripheral edema.   Gastrointestinal:  Negative for abdominal pain, constipation, diarrhea, heartburn, hematochezia and nausea.   Genitourinary:  Negative for dysuria, frequency, genital sores, hematuria, impotence, penile discharge and urgency.   Musculoskeletal:  Negative for arthralgias, joint swelling and myalgias.   Skin:  Negative for rash.   Neurological:  Negative for dizziness, weakness, headaches and paresthesias.   Psychiatric/Behavioral:  Negative for mood changes. The patient is not nervous/anxious.      He is non verbal currently. Staff says he will mumble when he is upset or angry. Sometimes will repeat old phrases he has heard many years again- likely from when he was institutionalized as a child.       OBJECTIVE:   /66   Pulse 118   Temp 98.7  F (37.1  C) (Tympanic)   Resp 16   Ht 1.727 m (5' 8\")   SpO2 97%   BMI 22.81 kg/m   Estimated body mass index is 22.81 kg/m  as calculated from the following:    Height as of this encounter: 1.727 m (5' 8\").    Weight as of 10/11/22: 68 kg (150 lb).  Physical Exam  GENERAL: healthy, alert and no distress  EYES: Eyes grossly normal to inspection, PERRL and conjunctivae and sclerae normal  HENT: ear canals and TM's normal, nose and mouth without ulcers or lesions  NECK: no adenopathy, no asymmetry, masses, or scars and thyroid normal to palpation  RESP: lungs clear to auscultation - no rales, rhonchi or wheezes  CV: regular rate and rhythm, " normal S1 S2, no S3 or S4, no murmur, click or rub, no peripheral edema and peripheral pulses strong  ABDOMEN: soft, nontender, no hepatosplenomegaly, no masses and bowel sounds normal  MS: no gross musculoskeletal defects noted, no edema  SKIN: no suspicious lesions or rashes  He sings briefly for me, as I examine him, otherwise non verbal. Is cooperative with exam.     Diagnostic Test Results:  Labs reviewed in Epic  Results for orders placed or performed in visit on 12/04/23 (from the past 24 hour(s))   Comprehensive Metabolic Panel   Result Value Ref Range    Sodium 138 135 - 145 mmol/L    Potassium 3.8 3.4 - 5.3 mmol/L    Carbon Dioxide (CO2) 25 22 - 29 mmol/L    Anion Gap 13 7 - 15 mmol/L    Urea Nitrogen 13.2 8.0 - 23.0 mg/dL    Creatinine 0.78 0.67 - 1.17 mg/dL    GFR Estimate >90 >60 mL/min/1.73m2    Calcium 9.6 8.8 - 10.2 mg/dL    Chloride 100 98 - 107 mmol/L    Glucose 185 (H) 70 - 99 mg/dL    Alkaline Phosphatase 132 40 - 150 U/L    AST 19 0 - 45 U/L    ALT 12 0 - 70 U/L    Protein Total 7.3 6.4 - 8.3 g/dL    Albumin 4.1 3.5 - 5.2 g/dL    Bilirubin Total 1.2 <=1.2 mg/dL   Uric acid   Result Value Ref Range    Uric Acid 3.4 3.4 - 7.0 mg/dL       ASSESSMENT / PLAN:   (Z00.00) Encounter for Medicare annual wellness exam  (primary encounter diagnosis)  Comment:    Plan:      (Z23) Need for Tdap vaccination  Comment:    Plan: Tdap, tetanus-diptheria-acell pertussis,         (BOOSTRIX) 5-2.5-18.5 LF-MCG/0.5 DONTAE injection             (Z29.11) Need for vaccination against respiratory syncytial virus  Comment:    Plan: respiratory syncytial virus vaccine, bivalent         (ABRYSVO) injection             (Q87.40) Marfan's syndrome  Comment: stable  Plan: busPIRone (BUSPAR) 7.5 MG tablet             (K59.03) Drug-induced constipation  Comment:    Plan: lubiprostone (AMITIZA) 24 MCG capsule             (F79) Intellectual disability  Comment: severe. Has an elevated glucose today. Diabetes is a potential side effect  of zyprexa. Will have him return for follow up labs, A1c  Plan: OLANZapine (ZYPREXA) 5 MG tablet             (N39.41) Urge incontinence of urine  Comment:  stable  Plan: tamsulosin (FLOMAX) 0.4 MG capsule         refilled    (R60.9) Peripheral edema  Comment: stable  Plan: furosemide (LASIX) 40 MG tablet        Refilled without change    (K59.00) Constipation, unspecified constipation type  Comment:    Plan: docusate sodium (COLACE) 100 MG capsule        Refilled without change    (M10.9) Gout, unspecified cause, unspecified chronicity, unspecified site  Comment:    Plan: allopurinol (ZYLOPRIM) 300 MG tablet, Uric acid        Significant drop in uric acid over the last few years, no symptoms, refilled this without change.     (E87.6) Hypokalemia  Comment:  resolved with replacement  Plan:      (R60.9) Edema, unspecified type  Comment:    Plan: Comprehensive Metabolic Panel, potassium         chloride ER (K-TAB/KLOR-CON) 10 MEQ CR tablet                   COUNSELING:  Reviewed preventive health counseling, as reflected in patient instructions       Healthy diet/nutrition        He reports that he quit smoking about 19 years ago. His smoking use included cigarettes. He has never used smokeless tobacco.      Appropriate preventive services were discussed with this patient, including applicable screening as appropriate for fall prevention, nutrition, physical activity, Tobacco-use cessation, weight loss and cognition.  Checklist reviewing preventive services available has been given to the patient.    Reviewed patients plan of care and provided an AVS. The Basic Care Plan (routine screening as documented in Health Maintenance) for Dhiraj meets the Care Plan requirement. This Care Plan has been established and reviewed with the caregiver.          Arvin Cooper MD  [facility]    Identified Health Risks:  I have reviewed Opioid Use Disorder and Substance Use Disorder risk factors and made any needed  referrals.

## 2023-12-07 RX ORDER — POTASSIUM CHLORIDE 20MEQ/15ML
10 LIQUID (ML) ORAL DAILY
Qty: 473 ML | Refills: 3 | Status: SHIPPED | OUTPATIENT
Start: 2023-12-07 | End: 2024-07-22

## 2023-12-12 ENCOUNTER — LAB (OUTPATIENT)
Dept: LAB | Facility: OTHER | Age: 78
End: 2023-12-12
Attending: FAMILY MEDICINE
Payer: COMMERCIAL

## 2023-12-12 DIAGNOSIS — R73.09 ELEVATED GLUCOSE: ICD-10-CM

## 2023-12-12 LAB — HBA1C MFR BLD: 5.8 % (ref 4–6.2)

## 2023-12-12 PROCEDURE — 36415 COLL VENOUS BLD VENIPUNCTURE: CPT | Mod: ZL

## 2023-12-12 PROCEDURE — 83036 HEMOGLOBIN GLYCOSYLATED A1C: CPT | Mod: ZL

## 2024-02-19 DIAGNOSIS — K59.00 CONSTIPATION, UNSPECIFIED CONSTIPATION TYPE: ICD-10-CM

## 2024-02-20 RX ORDER — DOCUSATE SODIUM 100 MG/1
100 CAPSULE, LIQUID FILLED ORAL DAILY
Qty: 100 CAPSULE | Refills: 2 | Status: SHIPPED | OUTPATIENT
Start: 2024-02-20

## 2024-02-20 NOTE — TELEPHONE ENCOUNTER
[id number] sent Rx request for the following:      Requested Prescriptions   Pending Prescriptions Disp Refills    docusate sodium (COLACE) 100 MG capsule [Pharmacy Med Name: DOCUSATE SODIUM 100MG SOFTGEL] 100 capsule 0     Sig: TAKE 1 CAPSULE (100 MG) BY MOUTH DAILY     Last Prescription Date:   12/4/23  Last Fill Qty/Refills:         100, R-  0  Last Office Visit:              12/4/23   Future Office visit:           None    Routing to provider- unclear why Pt was given limited supply initially.    Unable to complete prescription refill per RN Medication Refill Policy.     Pooja Carlos RN .............. 2/20/2024  11:17 AM

## 2024-02-21 DIAGNOSIS — F69 BEHAVIOR PROBLEM, ADULT: ICD-10-CM

## 2024-02-21 RX ORDER — HALOPERIDOL 5 MG/1
TABLET ORAL
Qty: 15 TABLET | Refills: 1 | Status: SHIPPED | OUTPATIENT
Start: 2024-02-21

## 2024-02-26 DIAGNOSIS — F69 BEHAVIOR PROBLEM, ADULT: ICD-10-CM

## 2024-03-01 RX ORDER — LORAZEPAM 2 MG/1
TABLET ORAL
Qty: 5 TABLET | Refills: 0 | Status: SHIPPED | OUTPATIENT
Start: 2024-03-01 | End: 2024-07-25

## 2024-03-01 NOTE — TELEPHONE ENCOUNTER
[facility] sent Rx request for the following:      Requested Prescriptions   Pending Prescriptions Disp Refills    LORazepam (ATIVAN) 2 MG tablet [Pharmacy Med Name: LORAZEPAM 2MG TABLET] 5 tablet 0     Sig: TAKE 1 TABLET BY MOUTH DAILY AS NEEDED PRIOR TO APPOINTMENT OR FOR BEHAVIORAL CONCERN       There is no refill protocol information for this order      Last Prescription Date:   11/02/23  Last Fill Qty/Refills:         5, R-0      Last Office Visit:              12/04/23   Future Office visit:           none    Routing refill request to provider for review/approval because:  Drug not on [facility]     Symone Walker, RN on 3/1/2024 at 10:44 AM

## 2024-04-01 ENCOUNTER — OFFICE VISIT (OUTPATIENT)
Dept: FAMILY MEDICINE | Facility: OTHER | Age: 79
End: 2024-04-01
Attending: STUDENT IN AN ORGANIZED HEALTH CARE EDUCATION/TRAINING PROGRAM
Payer: COMMERCIAL

## 2024-04-01 VITALS
HEART RATE: 80 BPM | RESPIRATION RATE: 16 BRPM | TEMPERATURE: 97.2 F | SYSTOLIC BLOOD PRESSURE: 124 MMHG | OXYGEN SATURATION: 98 % | DIASTOLIC BLOOD PRESSURE: 83 MMHG

## 2024-04-01 DIAGNOSIS — H61.23 BILATERAL IMPACTED CERUMEN: Primary | ICD-10-CM

## 2024-04-01 DIAGNOSIS — S00.412A ABRASION OF LEFT EAR CANAL, INITIAL ENCOUNTER: ICD-10-CM

## 2024-04-01 PROCEDURE — G0463 HOSPITAL OUTPT CLINIC VISIT: HCPCS

## 2024-04-01 PROCEDURE — G0463 HOSPITAL OUTPT CLINIC VISIT: HCPCS | Mod: 25,27

## 2024-04-01 PROCEDURE — 99213 OFFICE O/P EST LOW 20 MIN: CPT

## 2024-04-01 PROCEDURE — 69209 REMOVE IMPACTED EAR WAX UNI: CPT | Mod: 50

## 2024-04-01 ASSESSMENT — PAIN SCALES - GENERAL: PAINLEVEL: NO PAIN (0)

## 2024-04-01 NOTE — PROGRESS NOTES
ASSESSMENT/PLAN:    I have reviewed the nursing notes.  I have reviewed the findings, diagnosis, plan and need for follow up with the patient.    1. Bilateral impacted cerumen  2. Abrasion of left ear canal, initial encounter    Physical exam and symptoms consistent with bilateral impacted cerumen and an abrasion of the left ear canal.  Cerumen was successfully removed via ear lavage by nursing staff.  Reassured facility staff that there are currently no signs of infection in either ear.  Discussed that there is a 2 cm abrasion in the left ear canal.  Discussed that this could have been due to the skin being removed when the wax was removed.  Advised them to keep his ear canal clean and dry.  Discussed that Q-tips should not be used in the future and that if he develops cerumen in buildup that he can be seen in clinic for an ear lavage.  Gave information on other ways to treat cerumen buildup in the future.  May give Tylenol and ibuprofen as needed for ear discomfort.    Discussed warning signs/symptoms indicative of need to f/u    Follow up if symptoms persist or worsen or concerns    I explained my diagnostic considerations and recommendations to the patient's staff, who voiced understanding and agreement with the treatment plan. All questions were answered. We discussed potential side effects of any prescribed or recommended therapies, as well as expectations for response to treatments.    Franco Cole, SERGIO CNP  4/1/2024  10:51 AM    HPI:    Dhirja De La Cruz is a 79 year old male accompanied by facility staff who [facility] for concerns of blood in left ear    Patient presents with facility staff for concerns about blood in his left ear canal.  Staff states that his ears were being cleaned with a Q-tip yesterday and then they noticed blood coming out of the ear canal.  Patient is nonverbal but did not display any signs of discomfort.  Staff states that they were only cleaning the more  external parts of the ear canal with a Q-tip.    Past Medical History:   Diagnosis Date    Acute pancreatitis without infection or necrosis     Recurrent    Aortic aneurysm, intrathoracic (H24)     Chronic constipation     Gastritis without bleeding     Nonsteroidal induced gastritis with GI bleed    Glaucoma     Gout     2013    Hyperglycemia     Intellectual disability     No Comments Provided    Marfan's syndrome     No Comments Provided    Other specified disorders of veins     Chronic venous stasis    Situs inversus     No Comments Provided    Urinary incontinence     No Comments Provided     Past Surgical History:   Procedure Laterality Date    CHOLECYSTECTOMY       Social History     Tobacco Use    Smoking status: Former     Packs/day: 0     Types: Cigarettes     Quit date: 2004     Years since quittin.1    Smokeless tobacco: Never   Substance Use Topics    Alcohol use: No     Current Outpatient Medications   Medication Sig Dispense Refill    allopurinol (ZYLOPRIM) 300 MG tablet Take 1 tablet (300 mg) by mouth every morning 90 tablet 4    busPIRone (BUSPAR) 7.5 MG tablet Take 1 tablet (7.5 mg) by mouth 3 times daily 90 tablet 11    docusate sodium (COLACE) 100 MG capsule TAKE 1 CAPSULE (100 MG) BY MOUTH DAILY 100 capsule 2    furosemide (LASIX) 40 MG tablet TAKE 1 TAB BY MOUTH ON TUE THR SAT  tablet 3    EVIE-GAEL 8.6 MG tablet TAKE 1 TABLET BY MOUTH DAILY 180 tablet 1    haloperidol (HALDOL) 5 MG tablet TAKE ONE TABLET BY MOUTH AS NEEDED PRIOR TO APPT (MAX 1 PER DAY) 15 tablet 1    Incontinence Supply Disposable MISC ATTENDS ADV UDWR SZ:LARGE PC #[id number] 18/PP DX:N39.41 [facility]:  144EA/8PKGS/29DS 150 each 10    latanoprost (XALATAN) 0.005 % ophthalmic solution Place 1 drop into both eyes At Bedtime      LORazepam (ATIVAN) 2 MG tablet TAKE 1 TABLET BY MOUTH DAILY AS NEEDED PRIOR TO APPOINTMENT OR FOR BEHAVIORAL CONCERN 5 tablet 0    lubiprostone (AMITIZA) 24 MCG capsule Take 1  capsule (24 mcg) by mouth daily (with breakfast) 90 capsule 4    OLANZapine (ZYPREXA) 5 MG tablet TAKE 1 TABLET BY MOUTH DAILY AT 8 PM 90 tablet 3    omeprazole (PRILOSEC) 20 MG DR capsule TAKE 1 CAPSULE BY MOUTH DAILY 180 capsule 2    polyethylene glycol (MIRALAX) 17 GM/Dose powder DISSOLVE 17GM (1 CAPFUL) INLIQUID AND DRINK BY MOUTHDAILY 850 g 4    potassium chloride (KAYCIEL) 20 MEQ/15ML (10%) solution Take 7.5 mLs (10 mEq) by mouth daily 473 mL 3    psyllium 30.9 % POWD Take 1 Tablespoonful by mouth daily Mix in 8 ounces of water or orange juice. 1 Bottle 6    tamsulosin (FLOMAX) 0.4 MG capsule Take 1 capsule (0.4 mg) by mouth daily 90 capsule 3    timolol maleate (TIMOPTIC) 0.5 % ophthalmic solution INSTILL 1 DROP INTO BOTH EYES ONCE A DAY       No Known Allergies  Past medical history, past surgical history, current medications and allergies reviewed and accurate to the best of my knowledge.      ROS:  Refer to HPI    /83 (BP Location: Left arm, Patient Position: Sitting, Cuff Size: Adult Regular)   Pulse 80   Temp 97.2  F (36.2  C) (Tympanic)   Resp 16   SpO2 98%     EXAM:  General Appearance: Well appearing 79 year old male, appropriate appearance for age. No acute distress   Ears: Left TM intact, translucent with bony landmarks appreciated, no erythema, no effusion, no bulging, no purulence.  Right TM intact, translucent with bony landmarks appreciated, no erythema, no effusion, no bulging, no purulence.  Left auditory canal with cerumen impaction and blood noted, 2 cm abrasion noted in left ear canal.  Right auditory canal with cerumen impaction.  Normal external ears, non tender.  Respiratory: normal chest wall and respirations.  Normal effort. No increased work of breathing.  No cough appreciated.  Cardiac: RRR   Neuro: Alert   Psychological: normal affect, alert, and pleasant.

## 2024-04-01 NOTE — NURSING NOTE
The ear canals were irrigated with body-temperature tap water withthe jet of water directed superiorly.  The ear canals were then re-examined and cleared of the impaction.  The patient tolerated the procedure well.    Kaela Aranda LPN LPN....................  4/1/2024   11:23 AM

## 2024-04-01 NOTE — NURSING NOTE
"Chief Complaint   Patient presents with    Ear Problem     [facility] in his left ear. Patient was having his ears cleaned out last night and since then there has been a little blood pooling in it.   Initial /83 (BP Location: Left arm, Patient Position: Sitting, Cuff Size: Adult Regular)   Pulse 80   Temp 97.2  F (36.2  C) (Tympanic)   Resp 16   SpO2 98%  Estimated body mass index is 22.81 kg/m  as calculated from the following:    Height as of 12/4/23: 1.727 m (5' 8\").    Weight as of 10/11/22: 68 kg (150 lb).  Medication Review: complete    The next two questions are to help us understand your food security.  If you are feeling you need any assistance in this area, we have resources available to support you today.          12/4/2023   SDOH- Food Insecurity   Within the past 12 months, did you worry that your food would run out before you got money to buy more? N   Within the past 12 months, did the food you bought just not last and you didn t have money to get more? N         Health Care Directive:  Patient has a Health Care Directive on file      Caron Swanson      "

## 2024-04-02 NOTE — PROGRESS NOTES
Patient identified using two patient identifiers.  Ear exam showing wax occlusion completed by provider.  Solution: warm water was placed in the bilateral ear(s) via irrigation tool: elephant ear.      Gretel Diaz LPN on 4/1/2024 at 7:47 PM

## 2024-06-18 NOTE — PROGRESS NOTES
Assessment & Plan     1. Constipation, unspecified constipation type  Chronic constipation. Current medication list updated to use the docusate, Miralax, Senna, fiber power, and milk of magnesia as needed instead of scheduled as significant frequent use has led to loose watery stools. Follow up as needed.       No follow-ups on file.    Martha Martins is a 79 year old, presenting for the following health issues:  Bowel Problems    History of Present Illness       Reason for visit:  Loose stools    He eats 4 or more servings of fruits and vegetables daily.He consumes 0 sweetened beverage(s) daily.He exercises with enough effort to increase his heart rate 9 or less minutes per day.  He exercises with enough effort to increase his heart rate 3 or less days per week.   He is taking medications regularly.     Here with staff for discussion regarding bowel movements.  Has a history of chronic constipation for which she is currently prescribed docusate 100 mg once daily in the a.m., fiber powder once daily, MiraLAX 17 g capful in the a.m., senna 8.6 mg in the p.m. as well as milk of magnesia every other day and suppositories as needed.  Staff reports recently they have noticed his bowel movements has transition to more loose and watery stools.  They have cut back on his constipation medications and has noticed improvement to a more soft formed stool.  He is not straining to go, no blood in stool or pain reported.  Today he currently received a dose of the MiraLAX and fiber powder and that seems to be okay.  They are hoping they can have these orders updated to be used as needed so they can adjust based on consistency and patient symptoms.      PAST MEDICAL HISTORY:   Past Medical History:   Diagnosis Date    Acute pancreatitis without infection or necrosis     Recurrent    Aortic aneurysm, intrathoracic (H24)     Chronic constipation     Gastritis without bleeding     Nonsteroidal induced gastritis with GI bleed     Glaucoma     Gout     2013    Hyperglycemia     Intellectual disability     No Comments Provided    Marfan's syndrome     No Comments Provided    Other specified disorders of veins     Chronic venous stasis    Situs inversus     No Comments Provided    Urinary incontinence     No Comments Provided       PAST SURGICAL HISTORY:   Past Surgical History:   Procedure Laterality Date    CHOLECYSTECTOMY         FAMILY HISTORY: No family history on file.    SOCIAL HISTORY:   Social History     Tobacco Use    Smoking status: Former     Current packs/day: 0.00     Types: Cigarettes     Quit date: 2004     Years since quittin.4    Smokeless tobacco: Never   Substance Use Topics    Alcohol use: No      No Known Allergies  Current Outpatient Medications   Medication Sig Dispense Refill    allopurinol (ZYLOPRIM) 300 MG tablet Take 1 tablet (300 mg) by mouth every morning 90 tablet 4    busPIRone (BUSPAR) 7.5 MG tablet Take 1 tablet (7.5 mg) by mouth 3 times daily 90 tablet 11    docusate sodium (COLACE) 100 MG capsule TAKE 1 CAPSULE (100 MG) BY MOUTH DAILY 100 capsule 2    furosemide (LASIX) 40 MG tablet TAKE 1 TAB BY MOUTH ON  SAT  tablet 3    EVIE-GAEL 8.6 MG tablet TAKE 1 TABLET BY MOUTH DAILY 180 tablet 1    haloperidol (HALDOL) 5 MG tablet TAKE ONE TABLET BY MOUTH AS NEEDED PRIOR TO APPT (MAX 1 PER DAY) 15 tablet 1    Incontinence Supply Disposable MISC ATTENDS ADV UDWR SZ:LARGE PC #[id number] 18/PP DX:N39.41 HCPC:  144EA/8PKGS/29DS 150 each 10    latanoprost (XALATAN) 0.005 % ophthalmic solution Place 1 drop into both eyes At Bedtime      LORazepam (ATIVAN) 2 MG tablet TAKE 1 TABLET BY MOUTH DAILY AS NEEDED PRIOR TO APPOINTMENT OR FOR BEHAVIORAL CONCERN 5 tablet 0    lubiprostone (AMITIZA) 24 MCG capsule Take 1 capsule (24 mcg) by mouth daily (with breakfast) 90 capsule 4    OLANZapine (ZYPREXA) 5 MG tablet TAKE 1 TABLET BY MOUTH DAILY AT 8 PM 90 tablet 3    omeprazole (PRILOSEC) 20 MG   capsule TAKE 1 CAPSULE BY MOUTH DAILY 180 capsule 2    polyethylene glycol (MIRALAX) 17 GM/Dose powder DISSOLVE 17GM (1 CAPFUL) INLIQUID AND DRINK BY MOUTHDAILY 850 g 4    potassium chloride (KAYCIEL) 20 MEQ/15ML (10%) solution Take 7.5 mLs (10 mEq) by mouth daily 473 mL 3    psyllium 30.9 % POWD Take 1 Tablespoonful by mouth daily Mix in 8 ounces of water or orange juice. 1 Bottle 6    tamsulosin (FLOMAX) 0.4 MG capsule Take 1 capsule (0.4 mg) by mouth daily 90 capsule 3    timolol maleate (TIMOPTIC) 0.5 % ophthalmic solution INSTILL 1 DROP INTO BOTH EYES ONCE A DAY       No current facility-administered medications for this visit.           Objective    Temp 96.8  F (36  C)   Resp 16   There is no height or weight on file to calculate BMI.  Physical Exam   Exam not completed due to limited patient cooperation.    Signed Electronically by: Lilian Wyatt PA-C

## 2024-06-20 ENCOUNTER — OFFICE VISIT (OUTPATIENT)
Dept: FAMILY MEDICINE | Facility: OTHER | Age: 79
End: 2024-06-20
Attending: PHYSICIAN ASSISTANT
Payer: COMMERCIAL

## 2024-06-20 VITALS — TEMPERATURE: 96.8 F | RESPIRATION RATE: 16 BRPM

## 2024-06-20 DIAGNOSIS — K59.00 CONSTIPATION, UNSPECIFIED CONSTIPATION TYPE: Primary | ICD-10-CM

## 2024-06-20 PROCEDURE — 99213 OFFICE O/P EST LOW 20 MIN: CPT | Performed by: PHYSICIAN ASSISTANT

## 2024-06-20 PROCEDURE — G0463 HOSPITAL OUTPT CLINIC VISIT: HCPCS

## 2024-06-20 NOTE — NURSING NOTE
Patient presents to clinic with loose stools. He did have constipation and now he is having loose stools (2-3x days).  Crystal Salvador LPN ....................  6/20/2024   9:28 AM  EXT [id number]

## 2024-06-24 DIAGNOSIS — K59.00 CONSTIPATION, UNSPECIFIED CONSTIPATION TYPE: ICD-10-CM

## 2024-06-24 NOTE — TELEPHONE ENCOUNTER
Received fax from [facility] for a refill of Sennosides 8.6 mg. Not on the patient's medication list.  Nannette Adkins LPN..................6/24/2024   2:30 PM

## 2024-06-24 NOTE — TELEPHONE ENCOUNTER
Called [facility]. They do have sennosides 8.6 mg on as a generic. They faxed a refill request for this medication. We have this on file as Stephy-Jj 8.6.  Will route to [facility] Refill.  Amaya Crane RN on 6/24/2024 at 2:45 PM

## 2024-06-24 NOTE — TELEPHONE ENCOUNTER
[facility] sent Rx request for the following:      Requested Prescriptions   Pending Prescriptions Disp Refills    senna (EVIE-GAEL) 8.6 MG tablet 180 tablet 1     Sig: Take 1 tablet by mouth daily       There is no refill protocol information for this order          Last Prescription Date:   07/03/23  Last Fill Qty/Refills:         180, R-1    Last Office Visit:              06/20/24 (Lilian Wyatt)   Future Office visit:            None      Question whether patient should take one daily as prescribed (See last office note dated 6/20/24 by Lilian Wyatt).  Will route to patient's PCP for review and recommendations.  Amaya Crane RN on 6/24/2024 at 3:50 PM

## 2024-06-25 RX ORDER — SENNOSIDES A AND B 8.6 MG/1
1 TABLET, FILM COATED ORAL DAILY
Qty: 180 TABLET | Refills: 1 | Status: SHIPPED | OUTPATIENT
Start: 2024-06-25

## 2024-06-26 ENCOUNTER — TELEPHONE (OUTPATIENT)
Dept: FAMILY MEDICINE | Facility: OTHER | Age: 79
End: 2024-06-26
Payer: COMMERCIAL

## 2024-06-26 NOTE — TELEPHONE ENCOUNTER
Called and spoke with CHIN Chi for [facility] and informed of below.    Arti states patient has a dose for tomorrow so she will check in with [facility] tomorrow.  Chrissie Travis RN on 6/26/2024 at 2:28 PM

## 2024-06-26 NOTE — TELEPHONE ENCOUNTER
[facility] sent forms for preauth for lubiprostone  for the patient.  He only has one more day of this.  Please advise.

## 2024-06-26 NOTE — TELEPHONE ENCOUNTER
Called [facility] and they state insurance is requesting a PA.  They state they sent one on 6/17.  Called [facility] PA and they have not received a PA for patient on this but have pharmacy fax and they will watch for it and work on it.  Called [facility] and they will fax to [phone]      Chrissie Travis RN on 6/26/2024 at 1:32 PM

## 2024-06-26 NOTE — TELEPHONE ENCOUNTER
Spoke with [facility] PA department and they received PA and will send in .  Chrissie Travis RN on 6/26/2024 at 1:56 PM

## 2024-06-26 NOTE — TELEPHONE ENCOUNTER
Received message from PA department they received approval back from [facility] for Lubiprostone.  Called [facility] and Rx went through.  Chrissie Travis RN on 6/26/2024 at 3:14 PM

## 2024-07-22 DIAGNOSIS — R60.9 EDEMA, UNSPECIFIED TYPE: ICD-10-CM

## 2024-07-23 RX ORDER — POTASSIUM CHLORIDE 20MEQ/15ML
10 LIQUID (ML) ORAL DAILY
Qty: 473 ML | Refills: 3 | Status: SHIPPED | OUTPATIENT
Start: 2024-07-23

## 2024-07-23 NOTE — TELEPHONE ENCOUNTER
Prescription refilled per RN Medication Refill Policy..................Kirstin Gautam RN 7/23/2024 10:44 AM

## 2024-07-24 DIAGNOSIS — F69 BEHAVIOR PROBLEM, ADULT: ICD-10-CM

## 2024-07-25 RX ORDER — LORAZEPAM 2 MG/1
TABLET ORAL
Qty: 5 TABLET | Refills: 0 | Status: SHIPPED | OUTPATIENT
Start: 2024-07-25

## 2024-07-25 NOTE — TELEPHONE ENCOUNTER
Last Office Visit:              12/4/23 [facility]   Future Office visit:           none    Requested Prescriptions   Pending Prescriptions Disp Refills    LORazepam (ATIVAN) 2 MG tablet [Pharmacy Med Name: LORAZEPAM 2MG TABLET] 5 tablet 0     Sig: TAKE 1 TABLET BY MOUTH DAILY AS NEEDED PRIOR TO APPOINTMENT OR FOR BEHAVIORAL CONCERN       There is no refill protocol information for this order        Last Prescription Date:   3/1/24  Last Fill Qty/Refills:         5 / 0

## 2024-09-10 ENCOUNTER — TELEPHONE (OUTPATIENT)
Dept: FAMILY MEDICINE | Facility: OTHER | Age: 79
End: 2024-09-10
Payer: COMMERCIAL

## 2024-09-10 NOTE — TELEPHONE ENCOUNTER
Incoming fax from [facility] requesting signed order for REPAIRS on manual wheelchair, shower chair and semi-electric bed.    Form completed and awaiting MD signature.    Alma Colbert LPN 9/10/2024 3:49 PM

## 2024-09-11 ENCOUNTER — MEDICAL CORRESPONDENCE (OUTPATIENT)
Dept: HEALTH INFORMATION MANAGEMENT | Facility: OTHER | Age: 79
End: 2024-09-11
Payer: COMMERCIAL

## 2024-10-21 DIAGNOSIS — F79 INTELLECTUAL DISABILITY: ICD-10-CM

## 2024-10-21 DIAGNOSIS — N39.41 URGE INCONTINENCE OF URINE: ICD-10-CM

## 2024-10-21 DIAGNOSIS — Q87.40 MARFAN'S SYNDROME: ICD-10-CM

## 2024-10-28 RX ORDER — BUSPIRONE HYDROCHLORIDE 7.5 MG/1
7.5 TABLET ORAL 3 TIMES DAILY
Qty: 90 TABLET | Refills: 11 | OUTPATIENT
Start: 2024-10-28

## 2024-10-28 RX ORDER — OLANZAPINE 5 MG/1
TABLET ORAL
Qty: 90 TABLET | Refills: 3 | OUTPATIENT
Start: 2024-10-28

## 2024-10-28 RX ORDER — TAMSULOSIN HYDROCHLORIDE 0.4 MG/1
0.4 CAPSULE ORAL DAILY
Qty: 90 CAPSULE | Refills: 3 | OUTPATIENT
Start: 2024-10-28

## 2024-10-28 NOTE — TELEPHONE ENCOUNTER
[facility] of [place] sent Rx request for the following:    Redundant refill request refused: Too soon:  OLANZapine (ZYPREXA) 5 MG tablet 90 tablet 3 12/4/2023 -- No   Sig: TAKE 1 TABLET BY MOUTH DAILY AT 8 PM   Sent to pharmacy as: OLANZapine 5 MG Oral Tablet (zyPREXA)   Class: E-Prescribe   Order: [id number]   E-Prescribing Status: Receipt confirmed by pharmacy (12/4/2023 11:11 AM CST)     tamsulosin (FLOMAX) 0.4 MG [id number]/4/2023--NoSig - Route: Take 1 capsule (0.4 mg) by mouth daily - OralSent to pharmacy as: Tamsulosin HCl 0.4 MG Oral Capsule (FLOMAX)Class: E-PrescribeOrder: [id number] Status: Receipt confirmed by pharmacy (12/4/2023 11:11 AM CST)     busPIRone (BUSPAR) 7.5 MG tablet 90 tablet 11 12/4/2023 -- No   Sig - Route: Take 1 tablet (7.5 mg) by mouth 3 times daily - Oral   Sent to pharmacy as: busPIRone HCl 7.5 MG Oral Tablet (BUSPAR)   Class: E-Prescribe   Order: [id number]   E-Prescribing Status: Receipt confirmed by pharmacy (12/4/2023 11:11 AM CST)     [id number] - [place], [place] - [address] Carlos RN .............. 10/28/2024  1:38 PM

## 2024-10-29 DIAGNOSIS — K59.00 CONSTIPATION, UNSPECIFIED CONSTIPATION TYPE: ICD-10-CM

## 2024-10-31 RX ORDER — POLYETHYLENE GLYCOL 3350 17 G/17G
POWDER, FOR SOLUTION ORAL
Qty: 1530 G | Refills: 1 | Status: SHIPPED | OUTPATIENT
Start: 2024-10-31

## 2024-10-31 NOTE — TELEPHONE ENCOUNTER
[facility] sent Rx request for the following:      Requested Prescriptions   Pending Prescriptions Disp Refills    polyethylene glycol (MIRALAX) 17 GM/Dose powder [Pharmacy Med Name: PEG 3350 17G PWD FOR SOLN] 850 g 4     Sig: MIX 1 CAPFUL (17GM) AS DIRECTED AND DRINK ONCE DAILY   Last Prescription Date:   11/16/23  Last Fill Qty/Refills:         850 g, R-4     Last Office Visit:              6/20/24  Future Office visit:           1/10/25    Prescription refilled per RN Medication Refill Policy.................... Pooja Carlos RN ....................  10/31/2024   2:56 PM  
Decreased appetite

## 2024-11-19 DIAGNOSIS — Q87.40 MARFAN'S SYNDROME: ICD-10-CM

## 2024-11-19 DIAGNOSIS — M10.9 GOUT, UNSPECIFIED CAUSE, UNSPECIFIED CHRONICITY, UNSPECIFIED SITE: ICD-10-CM

## 2024-11-19 RX ORDER — ALLOPURINOL 300 MG/1
1 TABLET ORAL EVERY MORNING
Qty: 90 TABLET | Refills: 0 | Status: SHIPPED | OUTPATIENT
Start: 2024-11-19

## 2024-11-19 RX ORDER — BUSPIRONE HYDROCHLORIDE 7.5 MG/1
7.5 TABLET ORAL 3 TIMES DAILY
Qty: 90 TABLET | Refills: 0 | Status: SHIPPED | OUTPATIENT
Start: 2024-11-19

## 2024-11-19 NOTE — TELEPHONE ENCOUNTER
[id number] sent Rx request for the following:      Requested Prescriptions   Pending Prescriptions Disp Refills    allopurinol (ZYLOPRIM) 300 MG tablet [Pharmacy Med Name: ALLOPURINOL 300MG TABLET] 90 tablet 4     Sig: TAKE 1 TABLET (300 MG) BY MOUTH EVERY MORNING   Last Prescription Date:   12/4/23  Last Fill Qty/Refills:         90, R-4      Gout Agents Protocol Failed - 11/19/2024  2:23 PM        Failed - CBC on file in past 12 months     Recent Labs   Lab Test 11/21/22  1137 03/28/18  1804 10/09/17  1103   WBC 6.2   < >  --    GICHWBC  --   --  6.7   RBC 5.40   < >  --    GICHRBC  --   --  5.03   HGB 15.7   < > 15.5   HCT 46.7   < > 45.3      < > 235    < > = values in this interval not displayed.          busPIRone (BUSPAR) 7.5 MG tablet 90 tablet 11     Sig: Take 1 tablet (7.5 mg) by mouth 3 times daily.   Last Prescription Date:   12/4/23  Last Fill Qty/Refills:         90, R-11      Atypical Antidepressants Protocol Failed - 11/19/2024  2:23 PM        Failed - Medication indicated for associated diagnosis     Medication is associated with one or more of the following diagnoses:     Anxiety   Depression   Panic disorder     Last Office Visit:                6/20/24 12/4/23 (Px)    Future Office visit:           1/10/25    Unable to complete prescription refill per RN Medication Refill Policy. Pooja Carlos RN .............. 11/19/2024  2:24 PM    12/30

## 2024-12-02 ENCOUNTER — TELEPHONE (OUTPATIENT)
Dept: FAMILY MEDICINE | Facility: OTHER | Age: 79
End: 2024-12-02
Payer: COMMERCIAL

## 2024-12-02 DIAGNOSIS — N39.41 URGE INCONTINENCE OF URINE: ICD-10-CM

## 2024-12-02 DIAGNOSIS — F79 INTELLECTUAL DISABILITY: ICD-10-CM

## 2024-12-02 RX ORDER — TAMSULOSIN HYDROCHLORIDE 0.4 MG/1
0.4 CAPSULE ORAL DAILY
Qty: 90 CAPSULE | Refills: 3 | Status: SHIPPED | OUTPATIENT
Start: 2024-12-02

## 2024-12-02 RX ORDER — OLANZAPINE 5 MG/1
TABLET ORAL
Qty: 90 TABLET | Refills: 3 | Status: SHIPPED | OUTPATIENT
Start: 2024-12-02

## 2024-12-02 NOTE — TELEPHONE ENCOUNTER
Calling regarding olanzapine and tamsulosin. States pt was able to get a partial fill and is missing the last week of the month. Pharmacy said that they need a new prescription from [facility]. Please call

## 2024-12-02 NOTE — TELEPHONE ENCOUNTER
Notified an Rx was faxed in for the patient.  Nannette Adkins LPN..................12/2/2024   4:28 PM

## 2024-12-02 NOTE — TELEPHONE ENCOUNTER
The patient was only given 3 weeks of his Rx's as he is due for refills. Can you refill these medications for him?

## 2024-12-21 DIAGNOSIS — K59.03 DRUG-INDUCED CONSTIPATION: ICD-10-CM

## 2024-12-23 RX ORDER — LUBIPROSTONE 24 UG/1
24 CAPSULE ORAL
Qty: 90 CAPSULE | Refills: 1 | Status: SHIPPED | OUTPATIENT
Start: 2024-12-23

## 2024-12-23 NOTE — TELEPHONE ENCOUNTER
[facility] of [place] sent Rx request for the following:    Call Senait with [facility] @ [phone]. She is requesting a call back to know if she will need to  or if it will be delivered.      Requested Prescriptions   Pending Prescriptions Disp Refills    lubiprostone (AMITIZA) 24 MCG capsule [Pharmacy Med Name: LUBIPROSTONE 24MCG CAPSULE] 90 capsule 4     Sig: TAKE 1 CAPSULE (24 MCG) BY MOUTH DAILY (WITH BREAKFAST)       There is no refill protocol information for this order        Last Prescription Date:   12/4/23  Last Fill Qty/Refills:         90, R-4    Last Office Visit:              6/20/24 (constipation discussed)   Future Office visit:           1/10/25    Unable to complete prescription refill per RN Medication Refill Policy. Routing to covering provider for refill consideration, as PCP/provider is out of clinic >48 hours or Pt is completely out of medication and provider is out of the clinic today. Pooja Carlos RN .............. 12/23/2024  2:22 PM

## 2024-12-23 NOTE — TELEPHONE ENCOUNTER
Reason for call: Medication or medication refill    Have you contacted your pharmacy regarding this refill? yes    Name of medication requested: Amitiza    How many days of medication do you have left? 0-took last dose today.    What pharmacy do you use? [facility]    Preferred method for responding to this message: Telephone Call    Phone number patient can be reached at: Other phone number:    Call Senait with [facility] @ [phone]. She is requesting a call back to know if she will need to  or if it will be delivered.    If we cannot reach you directly, may we leave a detailed response at the number you provided? Yes    Maranda Douglas on 12/23/2024 at 2:14 PM

## 2024-12-30 DIAGNOSIS — R60.0 PERIPHERAL EDEMA: ICD-10-CM

## 2024-12-30 RX ORDER — FUROSEMIDE 40 MG/1
TABLET ORAL
Qty: 48 TABLET | Refills: 0 | Status: SHIPPED | OUTPATIENT
Start: 2024-12-30

## 2024-12-30 NOTE — TELEPHONE ENCOUNTER
[id number] sent Rx request for the following:      Requested Prescriptions   Pending Prescriptions Disp Refills    furosemide (LASIX) 40 MG tablet [Pharmacy Med Name: FUROSEMIDE 40MG TABLET] 100 tablet 3     Sig: TAKE 1 TAB BY MOUTH ON TUE THR SAT SUN       Diuretics (Including Combos) Protocol Failed - 12/30/2024  4:04 PM        Failed - Potassium level on file in past 12 months        Failed - Has GFR on file in past 12 months and most recent value is normal     Last Prescription Date:   12/4/23  Last Fill Qty/Refills:         100, R-3    Last Office Visit:                6/20/24 12/4/23 (Px)    Future Office visit:           1/10/25    Unable to complete prescription refill per RN Medication Refill Policy. Routing to covering provider for refill consideration, as PCP/provider is out of clinic >48 hours or Pt is completely out of medication and provider is out of the clinic today.Pooja Carlos RN .............. 12/30/2024  4:05 PM

## 2024-12-30 NOTE — TELEPHONE ENCOUNTER
Reason for call: Medication or medication refill    Have you contacted your pharmacy regarding this refill? yes    If No, direct the patient to contact the Pharmacy and discontinue telephone note using Erroneous Encounter.  If Yes, continue.    Name of medication requested: furosemide    How many days of medication do you have left? none    What pharmacy do you use? [facility]     Preferred method for responding to this message: Telephone Call    Phone number patient can be reached at: Cell number on file:    Telephone Information:   Mobile [phone]       If we cannot reach you directly, may we leave a detailed response at the number you provided? Yes

## 2025-01-06 RX ORDER — LATANOPROST 50 UG/ML
1 SOLUTION/ DROPS OPHTHALMIC AT BEDTIME
Refills: 4 | Status: CANCELLED | OUTPATIENT
Start: 2025-01-06

## 2025-01-10 ENCOUNTER — OFFICE VISIT (OUTPATIENT)
Dept: FAMILY MEDICINE | Facility: OTHER | Age: 80
End: 2025-01-10
Attending: FAMILY MEDICINE
Payer: COMMERCIAL

## 2025-01-10 VITALS
SYSTOLIC BLOOD PRESSURE: 122 MMHG | BODY MASS INDEX: 22.81 KG/M2 | OXYGEN SATURATION: 96 % | DIASTOLIC BLOOD PRESSURE: 72 MMHG | HEIGHT: 68 IN | HEART RATE: 112 BPM | TEMPERATURE: 98.1 F | RESPIRATION RATE: 16 BRPM

## 2025-01-10 DIAGNOSIS — K59.00 CONSTIPATION, UNSPECIFIED CONSTIPATION TYPE: ICD-10-CM

## 2025-01-10 DIAGNOSIS — N39.41 URGE INCONTINENCE OF URINE: ICD-10-CM

## 2025-01-10 DIAGNOSIS — K59.03 DRUG-INDUCED CONSTIPATION: ICD-10-CM

## 2025-01-10 DIAGNOSIS — Z23 NEED FOR DIPHTHERIA-TETANUS-PERTUSSIS (TDAP) VACCINE: ICD-10-CM

## 2025-01-10 DIAGNOSIS — Q87.40 MARFAN'S SYNDROME: ICD-10-CM

## 2025-01-10 DIAGNOSIS — M10.9 GOUT, UNSPECIFIED CAUSE, UNSPECIFIED CHRONICITY, UNSPECIFIED SITE: ICD-10-CM

## 2025-01-10 DIAGNOSIS — R73.09 ELEVATED GLUCOSE: ICD-10-CM

## 2025-01-10 DIAGNOSIS — R60.0 PERIPHERAL EDEMA: ICD-10-CM

## 2025-01-10 DIAGNOSIS — R60.9 EDEMA, UNSPECIFIED TYPE: ICD-10-CM

## 2025-01-10 DIAGNOSIS — R32 URINARY INCONTINENCE, UNSPECIFIED TYPE: ICD-10-CM

## 2025-01-10 DIAGNOSIS — F79 INTELLECTUAL DISABILITY: ICD-10-CM

## 2025-01-10 DIAGNOSIS — K29.60 OTHER GASTRITIS WITHOUT HEMORRHAGE, UNSPECIFIED CHRONICITY: ICD-10-CM

## 2025-01-10 DIAGNOSIS — F69 BEHAVIOR PROBLEM, ADULT: ICD-10-CM

## 2025-01-10 DIAGNOSIS — Z13.220 SCREENING FOR HYPERLIPIDEMIA: ICD-10-CM

## 2025-01-10 DIAGNOSIS — Z00.00 ENCOUNTER FOR MEDICARE ANNUAL WELLNESS EXAM: Primary | ICD-10-CM

## 2025-01-10 LAB
ALBUMIN SERPL BCG-MCNC: 4.1 G/DL (ref 3.5–5.2)
ALP SERPL-CCNC: 141 U/L (ref 40–150)
ALT SERPL W P-5'-P-CCNC: 11 U/L (ref 0–70)
ANION GAP SERPL CALCULATED.3IONS-SCNC: 13 MMOL/L (ref 7–15)
AST SERPL W P-5'-P-CCNC: 20 U/L (ref 0–45)
BILIRUB SERPL-MCNC: 0.9 MG/DL
BUN SERPL-MCNC: 20.9 MG/DL (ref 8–23)
CALCIUM SERPL-MCNC: 9.6 MG/DL (ref 8.8–10.4)
CHLORIDE SERPL-SCNC: 97 MMOL/L (ref 98–107)
CHOLEST SERPL-MCNC: 164 MG/DL
CREAT SERPL-MCNC: 0.92 MG/DL (ref 0.67–1.17)
EGFRCR SERPLBLD CKD-EPI 2021: 85 ML/MIN/1.73M2
ERYTHROCYTE [DISTWIDTH] IN BLOOD BY AUTOMATED COUNT: 14 % (ref 10–15)
FASTING STATUS PATIENT QL REPORTED: NO
FASTING STATUS PATIENT QL REPORTED: NO
GLUCOSE SERPL-MCNC: 234 MG/DL (ref 70–99)
HCO3 SERPL-SCNC: 25 MMOL/L (ref 22–29)
HCT VFR BLD AUTO: 41.8 % (ref 40–53)
HDLC SERPL-MCNC: 54 MG/DL
HGB BLD-MCNC: 14.5 G/DL (ref 13.3–17.7)
LDLC SERPL CALC-MCNC: 58 MG/DL
MCH RBC QN AUTO: 30.7 PG (ref 26.5–33)
MCHC RBC AUTO-ENTMCNC: 34.7 G/DL (ref 31.5–36.5)
MCV RBC AUTO: 89 FL (ref 78–100)
NONHDLC SERPL-MCNC: 110 MG/DL
PLATELET # BLD AUTO: 240 10E3/UL (ref 150–450)
POTASSIUM SERPL-SCNC: 3.7 MMOL/L (ref 3.4–5.3)
PROT SERPL-MCNC: 7.3 G/DL (ref 6.4–8.3)
RBC # BLD AUTO: 4.72 10E6/UL (ref 4.4–5.9)
SODIUM SERPL-SCNC: 135 MMOL/L (ref 135–145)
TRIGL SERPL-MCNC: 261 MG/DL
URATE SERPL-MCNC: 2.6 MG/DL (ref 3.4–7)
WBC # BLD AUTO: 10.1 10E3/UL (ref 4–11)

## 2025-01-10 PROCEDURE — 84550 ASSAY OF BLOOD/URIC ACID: CPT | Mod: ZL | Performed by: FAMILY MEDICINE

## 2025-01-10 PROCEDURE — 36415 COLL VENOUS BLD VENIPUNCTURE: CPT | Mod: ZL | Performed by: FAMILY MEDICINE

## 2025-01-10 PROCEDURE — 85014 HEMATOCRIT: CPT | Mod: ZL | Performed by: FAMILY MEDICINE

## 2025-01-10 PROCEDURE — 80061 LIPID PANEL: CPT | Mod: ZL | Performed by: FAMILY MEDICINE

## 2025-01-10 PROCEDURE — G0008 ADMIN INFLUENZA VIRUS VAC: HCPCS

## 2025-01-10 PROCEDURE — 80053 COMPREHEN METABOLIC PANEL: CPT | Mod: ZL | Performed by: FAMILY MEDICINE

## 2025-01-10 PROCEDURE — 90662 IIV NO PRSV INCREASED AG IM: CPT

## 2025-01-10 PROCEDURE — 83036 HEMOGLOBIN GLYCOSYLATED A1C: CPT | Mod: ZL | Performed by: FAMILY MEDICINE

## 2025-01-10 RX ORDER — BUSPIRONE HYDROCHLORIDE 7.5 MG/1
7.5 TABLET ORAL 3 TIMES DAILY
Qty: 90 TABLET | Refills: 4 | Status: SHIPPED | OUTPATIENT
Start: 2025-01-10

## 2025-01-10 RX ORDER — TAMSULOSIN HYDROCHLORIDE 0.4 MG/1
0.4 CAPSULE ORAL DAILY
Qty: 90 CAPSULE | Refills: 4 | Status: SHIPPED | OUTPATIENT
Start: 2025-01-10

## 2025-01-10 RX ORDER — POTASSIUM CHLORIDE 20MEQ/15ML
10 LIQUID (ML) ORAL DAILY
Qty: 473 ML | Refills: 4 | Status: SHIPPED | OUTPATIENT
Start: 2025-01-10

## 2025-01-10 RX ORDER — LUBIPROSTONE 24 UG/1
24 CAPSULE ORAL
Qty: 90 CAPSULE | Refills: 4 | Status: SHIPPED | OUTPATIENT
Start: 2025-01-10

## 2025-01-10 RX ORDER — OLANZAPINE 5 MG/1
TABLET ORAL
Qty: 90 TABLET | Refills: 4 | Status: SHIPPED | OUTPATIENT
Start: 2025-01-10

## 2025-01-10 RX ORDER — DOCUSATE SODIUM 100 MG/1
100 CAPSULE, LIQUID FILLED ORAL DAILY
Qty: 100 CAPSULE | Refills: 4 | Status: SHIPPED | OUTPATIENT
Start: 2025-01-10

## 2025-01-10 RX ORDER — HALOPERIDOL 5 MG/1
TABLET ORAL
Qty: 15 TABLET | Refills: 4 | Status: SHIPPED | OUTPATIENT
Start: 2025-01-10

## 2025-01-10 RX ORDER — POLYETHYLENE GLYCOL 3350 17 G/17G
POWDER, FOR SOLUTION ORAL
Qty: 1530 G | Refills: 4 | Status: SHIPPED | OUTPATIENT
Start: 2025-01-10

## 2025-01-10 RX ORDER — SENNOSIDES A AND B 8.6 MG/1
1 TABLET, FILM COATED ORAL DAILY
Qty: 180 TABLET | Refills: 4 | Status: SHIPPED | OUTPATIENT
Start: 2025-01-10

## 2025-01-10 RX ORDER — FUROSEMIDE 40 MG/1
TABLET ORAL
Qty: 48 TABLET | Refills: 4 | Status: SHIPPED | OUTPATIENT
Start: 2025-01-10

## 2025-01-10 RX ORDER — ALLOPURINOL 300 MG/1
1 TABLET ORAL EVERY MORNING
Qty: 90 TABLET | Refills: 4 | Status: SHIPPED | OUTPATIENT
Start: 2025-01-10

## 2025-01-10 RX ORDER — LORAZEPAM 2 MG/1
TABLET ORAL
Qty: 5 TABLET | Refills: 4 | Status: SHIPPED | OUTPATIENT
Start: 2025-01-10

## 2025-01-10 SDOH — HEALTH STABILITY: PHYSICAL HEALTH: ON AVERAGE, HOW MANY DAYS PER WEEK DO YOU ENGAGE IN MODERATE TO STRENUOUS EXERCISE (LIKE A BRISK WALK)?: 6 DAYS

## 2025-01-10 ASSESSMENT — SOCIAL DETERMINANTS OF HEALTH (SDOH): HOW OFTEN DO YOU GET TOGETHER WITH FRIENDS OR RELATIVES?: PATIENT DECLINED

## 2025-01-10 ASSESSMENT — PAIN SCALES - GENERAL: PAINLEVEL_OUTOF10: NO PAIN (0)

## 2025-01-10 NOTE — PROGRESS NOTES
Assessment & Plan     (Z00.00) Encounter for Medicare annual wellness exam  (primary encounter diagnosis)  Comment: see below  Plan:      (Z13.220) Screening for hyperlipidemia  Comment:    Plan: Lipid Profile             (M10.9) Gout, unspecified cause, unspecified chronicity, unspecified site  Comment: no symptoms, stable  Plan: Uric acid, allopurinol (ZYLOPRIM) 300 MG         tablet, Uric acid        Refilled     (Q87.40) Marfan's syndrome  Comment: doing quite good with this diagnosis. No further changes in management needed  Plan: CBC with platelets, Comprehensive metabolic         panel, busPIRone (BUSPAR) 7.5 MG tablet             (K59.00) Constipation, unspecified constipation type  Comment: stable  Plan: docusate sodium (COLACE) 100 MG capsule,         polyethylene glycol (MIRALAX) 17 GM/Dose         powder, senna (EVIE-GAEL) 8.6 MG tablet        refilled    (R60.0) Peripheral edema  Comment: stable on exam  Plan: furosemide (LASIX) 40 MG tablet        Refilled without change     (F69) Behavior problem, adult  Comment: he is having significant less problems with this. I assume with ageing and perhaps just having good staff with diversion tactics, behavioral interventions.  Plan: haloperidol (HALDOL) 5 MG tablet, LORazepam         (ATIVAN) 2 MG tablet             (R32) Urinary incontinence, unspecified type  Comment:    Plan:      (K59.03) Drug-induced constipation  Comment: stable  Plan: lubiprostone (AMITIZA) 24 MCG capsule        refilled    (F79) Intellectual disability  Comment: stable  Plan: OLANZapine (ZYPREXA) 5 MG tablet        Refilled     (K29.60) Other gastritis without hemorrhage, unspecified chronicity  Comment: no current sx  Plan: omeprazole (PRILOSEC) 20 MG DR capsule        refilled    (R60.9) Edema, unspecified type  Comment:    Plan: potassium chloride (KAYCIEL) 20 MEQ/15ML (10%)         solution             (N39.41) Urge incontinence of urine  Comment: appears stable  Plan: tamsulosin  "(FLOMAX) 0.4 MG capsule        refilled    (Z23) Need for diphtheria-tetanus-pertussis (Tdap) vaccine  Comment: at pharm   Plan: Tdap, tetanus-diptheria-acell pertussis,         (BOOSTRIX) 5-2.5-18.5 LF-MCG/0.5 DONTAE injection             (R73.09) Elevated glucose  Comment: normal A1c, so no diabetes. No interventions needed currently   Plan: Hemoglobin A1c                     Counseling  Appropriate preventive services were addressed with this patient via screening, questionnaire, or discussion as appropriate for fall prevention, nutrition, physical activity, Tobacco-use cessation, social engagement, weight loss and cognition.  Checklist reviewing preventive services available has been given to the patient.  Reviewed patient's diet, addressing concerns and/or questions.   Updated plan of care.  Patient reported difficulty with activities of daily living were addressed today.        No follow-ups on file.    Martha Martins is a 80 year old, presenting for the following health issues:  Medicare Visit      1/10/2025     2:54 PM   Additional Questions   Roomed by CHIN Vásquez   Accompanied by Rosa         1/10/2025   Forms   Any forms needing to be completed Yes     HPI     He lives in a group home. Is at least 1 assist for transfers and ambulation, mostly now in a wheel chair. Nearly completely non verbal. This has all remains stable over at least the last 4 years. No violent outbursts in that time in any way. He feeds himself, can use silverware. No choking.     See other note for ANW information.                   Objective    /72   Pulse 112   Temp 98.1  F (36.7  C)   Resp 16   Ht 1.727 m (5' 8\")   SpO2 96%   BMI 22.81 kg/m    Body mass index is 22.81 kg/m .  Physical Exam   GENERAL: alert and no distress. He is non verbal, sitting in a WC, ut follows simple commands  EYES: Eyes grossly normal to inspection, PERRL and conjunctivae and sclerae normal  HENT: ear canals and TM's normal, nose and mouth " without ulcers or lesions  NECK: no adenopathy, no asymmetry, masses, or scars  RESP: lungs clear to auscultation - no rales, rhonchi or wheezes  CV: regular rate and rhythm, normal S1 S2, no S3 or S4, no murmur, click or rub, no peripheral edema  ABDOMEN: soft, nontender, no hepatosplenomegaly, no masses and bowel sounds normal  MS: no gross musculoskeletal defects noted, no edema  SKIN: no suspicious lesions or rashes  NEURO: Normal strength and tone,     Results for orders placed or performed in visit on 01/10/25   Uric acid     Status: Abnormal   Result Value Ref Range    Uric Acid 2.6 (L) 3.4 - 7.0 mg/dL   CBC with platelets     Status: Normal   Result Value Ref Range    WBC Count 10.1 4.0 - 11.0 10e3/uL    RBC Count 4.72 4.40 - 5.90 10e6/uL    Hemoglobin 14.5 13.3 - 17.7 g/dL    Hematocrit 41.8 40.0 - 53.0 %    MCV 89 78 - 100 fL    MCH 30.7 26.5 - 33.0 pg    MCHC 34.7 31.5 - 36.5 g/dL    RDW 14.0 10.0 - 15.0 %    Platelet Count 240 150 - 450 10e3/uL   Comprehensive metabolic panel     Status: Abnormal   Result Value Ref Range    Sodium 135 135 - 145 mmol/L    Potassium 3.7 3.4 - 5.3 mmol/L    Carbon Dioxide (CO2) 25 22 - 29 mmol/L    Anion Gap 13 7 - 15 mmol/L    Urea Nitrogen 20.9 8.0 - 23.0 mg/dL    Creatinine 0.92 0.67 - 1.17 mg/dL    GFR Estimate 85 >60 mL/min/1.73m2    Calcium 9.6 8.8 - 10.4 mg/dL    Chloride 97 (L) 98 - 107 mmol/L    Glucose 234 (H) 70 - 99 mg/dL    Alkaline Phosphatase 141 40 - 150 U/L    AST 20 0 - 45 U/L    ALT 11 0 - 70 U/L    Protein Total 7.3 6.4 - 8.3 g/dL    Albumin 4.1 3.5 - 5.2 g/dL    Bilirubin Total 0.9 <=1.2 mg/dL    Patient Fasting > 8hrs? No    Lipid Profile     Status: Abnormal   Result Value Ref Range    Cholesterol 164 <200 mg/dL    Triglycerides 261 (H) <150 mg/dL    Direct Measure HDL 54 >=40 mg/dL    LDL Cholesterol Calculated 58 <100 mg/dL    Non HDL Cholesterol 110 <130 mg/dL    Patient Fasting > 8hrs? No     Narrative    Cholesterol  Desirable: < 200  mg/dL  Borderline High: 200 - 239 mg/dL  High: >= 240 mg/dL    Triglycerides  Normal: < 150 mg/dL  Borderline High: 150 - 199 mg/dL  High: 200-499 mg/dL  Very High: >= 500 mg/dL    Direct Measure HDL  Female: >= 50 mg/dL   Male: >= 40 mg/dL    LDL Cholesterol  Desirable: < 100 mg/dL  Above Desirable: 100 - 129 mg/dL   Borderline High: 130 - 159 mg/dL   High:  160 - 189 mg/dL   Very High: >= 190 mg/dL    Non HDL Cholesterol  Desirable: < 130 mg/dL  Above Desirable: 130 - 159 mg/dL  Borderline High: 160 - 189 mg/dL  High: 190 - 219 mg/dL  Very High: >= 220 mg/dL   Hemoglobin A1c     Status: Abnormal   Result Value Ref Range    Estimated Average Glucose 123 (H) <117 mg/dL    Hemoglobin A1C 5.9 (H) <5.7 %           Signed Electronically by: Arvin Cooper MD

## 2025-01-10 NOTE — PATIENT INSTRUCTIONS
Patient Education   Preventive Care Advice   This is general advice given by our system to help you stay healthy. However, your care team may have specific advice just for you. Please talk to your care team about your preventive care needs.  Nutrition  Eat 5 or more servings of fruits and vegetables each day.  Try wheat bread, brown rice and whole grain pasta (instead of white bread, rice, and pasta).  Get enough calcium and vitamin D. Check the label on foods and aim for 100% of the RDA (recommended daily allowance).  Lifestyle  Exercise at least 150 minutes each week  (30 minutes a day, 5 days a week).  Do muscle strengthening activities 2 days a week. These help control your weight and prevent disease.  No smoking.  Wear sunscreen to prevent skin cancer.  Have a dental exam and cleaning every 6 months.  Yearly exams  See your health care team every year to talk about:  Any changes in your health.  Any medicines your care team has prescribed.  Preventive care, family planning, and ways to prevent chronic diseases.  Shots (vaccines)   HPV shots (up to age 26), if you've never had them before.  Hepatitis B shots (up to age 59), if you've never had them before.  COVID-19 shot: Get this shot when it's due.  Flu shot: Get a flu shot every year.  Tetanus shot: Get a tetanus shot every 10 years.  Pneumococcal, hepatitis A, and RSV shots: Ask your care team if you need these based on your risk.  Shingles shot (for age 50 and up)  General health tests  Diabetes screening:  Starting at age 35, Get screened for diabetes at least every 3 years.  If you are younger than age 35, ask your care team if you should be screened for diabetes.  Cholesterol test: At age 39, start having a cholesterol test every 5 years, or more often if advised.  Bone density scan (DEXA): At age 50, ask your care team if you should have this scan for osteoporosis (brittle bones).  Hepatitis C: Get tested at least once in your life.  STIs (sexually  transmitted infections)  Before age 24: Ask your care team if you should be screened for STIs.  After age 24: Get screened for STIs if you're at risk. You are at risk for STIs (including HIV) if:  You are sexually active with more than one person.  You don't use condoms every time.  You or a partner was diagnosed with a sexually transmitted infection.  If you are at risk for HIV, ask about PrEP medicine to prevent HIV.  Get tested for HIV at least once in your life, whether you are at risk for HIV or not.  Cancer screening tests  Cervical cancer screening: If you have a cervix, begin getting regular cervical cancer screening tests starting at age 21.  Breast cancer scan (mammogram): If you've ever had breasts, begin having regular mammograms starting at age 40. This is a scan to check for breast cancer.  Colon cancer screening: It is important to start screening for colon cancer at age 45.  Have a colonoscopy test every 10 years (or more often if you're at risk) Or, ask your provider about stool tests like a FIT test every year or Cologuard test every 3 years.  To learn more about your testing options, visit:   .  For help making a decision, visit:   https://bit.ly/kp00405.  Prostate cancer screening test: If you have a prostate, ask your care team if a prostate cancer screening test (PSA) at age 55 is right for you.  Lung cancer screening: If you are a current or former smoker ages 50 to 80, ask your care team if ongoing lung cancer screenings are right for you.  For informational purposes only. Not to replace the advice of your health care provider. Copyright   2023 [facility]. All rights reserved. Clinically reviewed by the [facility]. [employer] [id number] - REV 01/24.  Learning About Activities of Daily Living  What are activities of daily living?     Activities of daily living (ADLs) are the basic self-care tasks you do every day. These include eating, bathing, dressing,  and moving around.  As you age, and if you have health problems, you may find that it's harder to do some of these tasks. If so, your doctor can suggest ideas that may help.  To measure what kind of help you may need, your doctor will ask how well you are able to do ADLs. Let your doctor know if there are any tasks that you are having trouble doing. This is an important first step to getting help. And when you have the help you need, you can stay as independent as possible.  How will a doctor assess your ADLs?  Asking about ADLs is part of a routine health checkup your doctor will likely do as you age. Your health check might be done in a doctor's office, in your home, or at a hospital. The goal is to find out if you are having any problems that could make it hard to care for yourself or that make it unsafe for you to be on your own.  To measure your ADLs, your doctor will ask how hard it is for you to do routine tasks. Your doctor may also want to know if you have changed the way you do a task because of a health problem. Your doctor may watch how you:  Walk back and forth.  Keep your balance while you stand or walk.  Move from sitting to standing or from a bed to a chair.  Button or unbutton a shirt or sweater.  Remove and put on your shoes.  It's common to feel a little worried or anxious if you find you can't do all the things you used to be able to do. Talking with your doctor about ADLs is a way to make sure you're as safe as possible and able to care for yourself as well as you can. You may want to bring a caregiver, friend, or family member to your checkup. They can help you talk to your doctor.  Follow-up care is a key part of your treatment and safety. Be sure to make and go to all appointments, and call your doctor if you are having problems. It's also a good idea to know your test results and keep a list of the medicines you take.  Current as of: October 24, 2023  Content Version: 14.3    2024 [facility]  [employer].   Care instructions adapted under license by your healthcare professional. If you have questions about a medical condition or this instruction, always ask your healthcare professional. [facility] [employer] disclaims any warranty or liability for your use of this information.    Preventing Falls: Care Instructions  Injuries and health problems such as trouble walking or poor eyesight can increase your risk of falling. So can some medicines. But there are things you can do to help prevent falls. You can exercise to get stronger. You can also arrange your home to make it safer.    Talk to your doctor about the medicines you take. Ask if any of them increase the risk of falls and whether they can be changed or stopped.   Try to exercise regularly. It can help improve your strength and balance. This can help lower your risk of falling.         Practice fall safety and prevention.   Wear low-heeled shoes that fit well and give your feet good support. Talk to your doctor if you have foot problems that make this hard.  Carry a cellphone or wear a medical alert device that you can use to call for help.  Use stepladders instead of chairs to reach high objects. Don't climb if you're at risk for falls. Ask for help, if needed.  Wear the correct eyeglasses, if you need them.        Make your home safer.   Remove rugs, cords, clutter, and furniture from walkways.  Keep your house well lit. Use night-lights in hallways and bathrooms.  Install and use sturdy handrails on stairways.  Wear nonskid footwear, even inside. Don't walk barefoot or in socks without shoes.        Be safe outside.   Use handrails, curb cuts, and ramps whenever possible.  Keep your hands free by using a shoulder bag or backpack.  Try to walk in well-lit areas. Watch out for uneven ground, changes in pavement, and debris.  Be careful in the winter. Walk on the grass or gravel when sidewalks are slippery. Use de-icer on steps and walkways.  "Add non-slip devices to shoes.    Put grab bars and nonskid mats in your shower or tub and near the toilet. Try to use a shower chair or bath bench when bathing.   Get into a tub or shower by putting in your weaker leg first. Get out with your strong side first. Have a phone or medical alert device in the bathroom with you.   Where can you learn more?  Go to https://www.[employer].net/patiented  Enter G117 in the search box to learn more about \"Preventing Falls: Care Instructions.\"  Current as of: July 31, 2024  Content Version: 14.3    2024 [employer].   Care instructions adapted under license by your healthcare professional. If you have questions about a medical condition or this instruction, always ask your healthcare professional. [employer] disclaims any warranty or liability for your use of this information.       "

## 2025-01-10 NOTE — PROGRESS NOTES
Preventive Care Visit  [facility]  Arvin Cooper MD, Family Medicine  Leo 10, 2025        Encounter for Medicare annual wellness exam    -Colon cancer screening not on file but no longer indicated d/t age.    -PSA lab work not on file.    -Immunizations: Patient is due for the following: Covid, RSV, Tdap, Shingrix, and Influenza. Patient would like flu shot per staff.     -Derm: Does patient regularly see dermatologist? No.     -Refills pended for requested medications.  -Labs pended.     Counseling  Appropriate preventive services were addressed with this patient via screening, questionnaire, or discussion as appropriate for fall prevention, nutrition, physical activity, Tobacco-use cessation, social engagement, weight loss and cognition.  Checklist reviewing preventive services available has been given to the patient.  Reviewed patient's diet, addressing concerns and/or questions.   Updated plan of care.  Patient reported difficulty with activities of daily living were addressed today.      No follow-ups on file.      Martha Martins is a 80 year old, presenting for the following:  Medicare Visit        1/10/2025     2:54 PM   Additional Questions   Roomed by CHIN Vásquez   Accompanied by oRsa             Health Care Directive  Patient has a Health Care Directive on file.        1/10/2025   General Health   How would you rate your overall physical health? Good   Feel stress (tense, anxious, or unable to sleep) Patient declined         1/10/2025   Nutrition   Diet: Regular (no restrictions)         1/10/2025   Exercise   Days per week of moderate/strenous exercise 6 days         1/10/2025   Social Factors   Frequency of gathering with friends or relatives Patient declined   Worry food won't last until get money to buy more No   Food not last or not have enough money for food? No   Do you have housing? (Housing is defined as stable permanent housing and does not include staying ouside in a car,  in a tent, in an abandoned building, in an overnight shelter, or couch-surfing.) Yes   Are you worried about losing your housing? No   Lack of transportation? No   Unable to get utilities (heat,electricity)? No         1/10/2025   Fall Risk   Fallen 2 or more times in the past year? No   Trouble with walking or balance? Yes   Reason Gait Speed Test Not Completed Patient does not tolerate an upright or standing position (e.g. wheelchair)          1/10/2025   Activities of Daily Living- Home Safety   Needs help with the following daily activites Telephone use    Transportation    Shopping    Preparing meals    Housework    Bathing    Laundry    Medication administration    Toileting    Feeding    Dressing   Safety concerns in the home None of the above       Multiple values from one day are sorted in reverse-chronological order         1/10/2025   Dental   Dentist two times every year? Yes         1/10/2025   Hearing Screening   Hearing concerns? None of the above         1/10/2025   Driving Risk Screening   Patient/family members have concerns about driving (!) DECLINE         1/10/2025   General Alertness/Fatigue Screening   Have you been more tired than usual lately? (!) DECLINE         1/10/2025   Urinary Incontinence Screening   Bothered by leaking urine in past 6 months No         1/10/2025   TB Screening   Were you born outside of the [place]? No         Today's PHQ-2 Score:       1/10/2025     2:43 PM   PHQ-2 ( 1999 Pfizer)   PHQ-2 Score Incomplete           1/10/2025   Substance Use   Alcohol more than 3/day or more than 7/wk Not Applicable   Do you have a current opioid prescription? No   How severe/bad is pain from 1 to 10? 0/10 (No Pain)   Do you use any other substances recreationally? No     Social History     Tobacco Use    Smoking status: Former     Current packs/day: 0.00     Types: Cigarettes     Quit date: 2004     Years since quittin.9    Smokeless tobacco: Never   Vaping Use    Vaping status:  "Never Used   Substance Use Topics    Alcohol use: No    Drug use: Never                Reviewed and updated as needed this visit by Provider                              Current providers sharing in care for this patient include:  Patient Care Team:  Arvin Cooper MD as PCP - General (Family Medicine)  Arvin Cooper MD as Assigned PCP    The following health maintenance items are reviewed in Epic and correct as of today:  Health Maintenance   Topic Date Due    RSV VACCINE (1 - 1-dose 75+ series) Never done    DTAP/TDAP/TD IMMUNIZATION (2 - Td or Tdap) 07/22/2023    INFLUENZA VACCINE (1) 09/01/2024    COVID-19 Vaccine (6 - 2024-25 season) 09/01/2024    URIC ACID  12/04/2024    PHQ-2 (once per calendar year)  01/01/2025    MEDICARE ANNUAL WELLNESS VISIT  01/10/2026    FALL RISK ASSESSMENT  01/10/2026    GLUCOSE  12/04/2026    ADVANCE CARE PLANNING  12/05/2028    Pneumococcal Vaccine: 50+ Years  Completed    HEPATITIS C SCREENING  Addressed    HPV IMMUNIZATION  Aged Out    MENINGITIS IMMUNIZATION  Aged Out    RSV MONOCLONAL ANTIBODY  Aged Out    LIPID  Discontinued    COLORECTAL CANCER SCREENING  Discontinued    ZOSTER IMMUNIZATION  Discontinued        Objective      Exam  /72   Pulse 112   Temp 98.1  F (36.7  C)   Resp 16   Ht 1.727 m (5' 8\")   SpO2 96%   BMI 22.81 kg/m               1/10/2025   Mini Cog   Mini-Cog Not Completed (choose reason) Mental handicap              Signed Electronically by: Arvin Cooper MD    "

## 2025-01-13 ENCOUNTER — MYC MEDICAL ADVICE (OUTPATIENT)
Dept: FAMILY MEDICINE | Facility: OTHER | Age: 80
End: 2025-01-13
Payer: COMMERCIAL

## 2025-01-13 DIAGNOSIS — Z13.1 SCREENING FOR DIABETES MELLITUS: ICD-10-CM

## 2025-01-13 DIAGNOSIS — R73.09 ELEVATED GLUCOSE: Primary | ICD-10-CM

## 2025-01-13 LAB
EST. AVERAGE GLUCOSE BLD GHB EST-MCNC: 123 MG/DL
HBA1C MFR BLD: 5.9 %

## 2025-01-13 NOTE — TELEPHONE ENCOUNTER
Dr. Cooper,    I can't find where you were asking for fasting labs.  Just that you wanted them to follow-up in clinic in a few weeks (in regards to elevated glucose).      I did elian up Hgb A1C as ADD-ON if you are wanting to order.     Otherwise- did you want him to do the Lipid Panel fasting?      ___________________________________________________________________    1/10/25  Labs completed.   (Not fasting)      This is now diabetes. Please just follow up in the clinic in a few weeks (can be with any provider if you cannot see me, Maribel De La Rosa works closely with me) on this.   Written by Arvin Cooper MD on 1/13/2025  7:38 AM CST  Seen by proxy Jessica Altamirano on 1/13/2025  9:03 AM    Iris Oneil RN on 1/13/2025 at 11:22 AM

## 2025-01-13 NOTE — TELEPHONE ENCOUNTER
See tandem MyChart sent same day about same concern:  Labs.     Iris Oneil RN on 1/13/2025 at 11:23 AM

## 2025-01-13 NOTE — TELEPHONE ENCOUNTER
No need for fasting labs. Can just be the add on A1c. I ordered it. Given his age and overall health, treatment will be non aggressive. Target A1c under 8.

## 2025-04-21 DIAGNOSIS — Q87.40 MARFAN'S SYNDROME: ICD-10-CM

## 2025-04-23 RX ORDER — BUSPIRONE HYDROCHLORIDE 7.5 MG/1
7.5 TABLET ORAL 3 TIMES DAILY
Qty: 90 TABLET | Refills: 7 | Status: SHIPPED | OUTPATIENT
Start: 2025-04-23

## 2025-04-23 NOTE — TELEPHONE ENCOUNTER
[id number] sent Rx request for the following:      Requested Prescriptions   Pending Prescriptions Disp Refills    busPIRone (BUSPAR) 7.5 MG tablet [Pharmacy Med Name: BUSPIRONE 7.5MG TABLET] 90 tablet 4     Sig: TAKE 1 TABLET (7.5 MG) BY MOUTH 3 TIMES DAILY.       Atypical Antidepressants Protocol Failed - 4/23/2025  9:46 AM        Failed - Medication indicated for associated diagnosis     Medication is associated with one or more of the following diagnoses:     Anxiety   Depression   Panic disorder     Last Prescription Date:   1/10/25  Last Fill Qty/Refills:         90, R-4    Marfan's syndrome [Q87.40]     Last Office Visit:              1/10/25 (Px)  Future Office visit:           None    Unable to complete prescription refill per RN Medication Refill Policy. Pooja Carlos, Refill RN .............. 4/23/2025  9:52 AM

## (undated) RX ORDER — OSELTAMIVIR PHOSPHATE 75 MG/1
CAPSULE ORAL
Status: DISPENSED
Start: 2018-03-28

## (undated) RX ORDER — SODIUM CHLORIDE 9 MG/ML
INJECTION, SOLUTION INTRAVENOUS
Status: DISPENSED
Start: 2021-12-09

## (undated) RX ORDER — OLANZAPINE 5 MG/1
TABLET, ORALLY DISINTEGRATING ORAL
Status: DISPENSED
Start: 2021-11-26

## (undated) RX ORDER — SODIUM CHLORIDE 9 MG/ML
INJECTION, SOLUTION INTRAVENOUS
Status: DISPENSED
Start: 2021-11-26

## (undated) RX ORDER — MAGNESIUM CARB/ALUMINUM HYDROX 105-160MG
TABLET,CHEWABLE ORAL
Status: DISPENSED
Start: 2021-12-09

## (undated) RX ORDER — AZITHROMYCIN 250 MG/1
TABLET, FILM COATED ORAL
Status: DISPENSED
Start: 2018-03-28